# Patient Record
Sex: FEMALE | Race: WHITE | NOT HISPANIC OR LATINO | URBAN - METROPOLITAN AREA
[De-identification: names, ages, dates, MRNs, and addresses within clinical notes are randomized per-mention and may not be internally consistent; named-entity substitution may affect disease eponyms.]

---

## 2017-09-01 ENCOUNTER — EMERGENCY (EMERGENCY)
Facility: HOSPITAL | Age: 77
LOS: 1 days | Discharge: HOME | End: 2017-09-01
Admitting: EMERGENCY MEDICINE
Payer: MEDICARE

## 2017-09-01 PROCEDURE — 90700 DTAP VACCINE < 7 YRS IM: CPT

## 2017-09-01 PROCEDURE — 70450 CT HEAD/BRAIN W/O DYE: CPT | Mod: 26

## 2017-09-01 PROCEDURE — 70450 CT HEAD/BRAIN W/O DYE: CPT

## 2017-09-01 PROCEDURE — 99284 EMERGENCY DEPT VISIT MOD MDM: CPT | Mod: 25

## 2017-09-01 PROCEDURE — 90471 IMMUNIZATION ADMIN: CPT

## 2017-09-01 PROCEDURE — 99284 EMERGENCY DEPT VISIT MOD MDM: CPT

## 2019-07-03 ENCOUNTER — INPATIENT (INPATIENT)
Facility: HOSPITAL | Age: 79
LOS: 1 days | Discharge: HOME | DRG: 280 | End: 2019-07-05
Attending: INTERNAL MEDICINE | Admitting: INTERNAL MEDICINE
Payer: MEDICARE

## 2019-07-03 VITALS
OXYGEN SATURATION: 92 % | HEIGHT: 67 IN | TEMPERATURE: 98 F | RESPIRATION RATE: 18 BRPM | DIASTOLIC BLOOD PRESSURE: 105 MMHG | WEIGHT: 160.06 LBS | HEART RATE: 122 BPM | SYSTOLIC BLOOD PRESSURE: 159 MMHG

## 2019-07-03 LAB
ALBUMIN SERPL ELPH-MCNC: 3.6 G/DL — SIGNIFICANT CHANGE UP (ref 3.3–5)
ALP SERPL-CCNC: 164 U/L — HIGH (ref 40–120)
ALT FLD-CCNC: 30 U/L DA — SIGNIFICANT CHANGE UP (ref 10–45)
ANION GAP SERPL CALC-SCNC: 11 MMOL/L — SIGNIFICANT CHANGE UP (ref 5–17)
APPEARANCE UR: SIGNIFICANT CHANGE UP
APTT BLD: 28 SEC — SIGNIFICANT CHANGE UP (ref 27.5–36.3)
AST SERPL-CCNC: 23 U/L — SIGNIFICANT CHANGE UP (ref 10–40)
BACTERIA # UR AUTO: ABNORMAL /HPF
BASOPHILS # BLD AUTO: 0.06 K/UL — SIGNIFICANT CHANGE UP (ref 0–0.2)
BASOPHILS NFR BLD AUTO: 0.5 % — SIGNIFICANT CHANGE UP (ref 0–2)
BILIRUB SERPL-MCNC: 0.6 MG/DL — SIGNIFICANT CHANGE UP (ref 0.2–1.2)
BILIRUB UR-MCNC: NEGATIVE — SIGNIFICANT CHANGE UP
BUN SERPL-MCNC: 14 MG/DL — SIGNIFICANT CHANGE UP (ref 7–23)
CALCIUM SERPL-MCNC: 9.4 MG/DL — SIGNIFICANT CHANGE UP (ref 8.4–10.5)
CHLORIDE SERPL-SCNC: 96 MMOL/L — SIGNIFICANT CHANGE UP (ref 96–108)
CO2 SERPL-SCNC: 24 MMOL/L — SIGNIFICANT CHANGE UP (ref 22–31)
COLOR SPEC: YELLOW — SIGNIFICANT CHANGE UP
CREAT SERPL-MCNC: 0.86 MG/DL — SIGNIFICANT CHANGE UP (ref 0.5–1.3)
DIFF PNL FLD: ABNORMAL
EOSINOPHIL # BLD AUTO: 0.07 K/UL — SIGNIFICANT CHANGE UP (ref 0–0.5)
EOSINOPHIL NFR BLD AUTO: 0.5 % — SIGNIFICANT CHANGE UP (ref 0–6)
EPI CELLS # UR: SIGNIFICANT CHANGE UP
GLUCOSE SERPL-MCNC: 161 MG/DL — HIGH (ref 70–99)
GLUCOSE UR QL: NEGATIVE — SIGNIFICANT CHANGE UP
HCT VFR BLD CALC: 40.9 % — SIGNIFICANT CHANGE UP (ref 34.5–45)
HGB BLD-MCNC: 13.5 G/DL — SIGNIFICANT CHANGE UP (ref 11.5–15.5)
IMM GRANULOCYTES NFR BLD AUTO: 0.3 % — SIGNIFICANT CHANGE UP (ref 0–1.5)
INR BLD: 1.07 RATIO — SIGNIFICANT CHANGE UP (ref 0.88–1.16)
KETONES UR-MCNC: ABNORMAL
LACTATE SERPL-SCNC: 1.8 MMOL/L — SIGNIFICANT CHANGE UP (ref 0.7–2)
LEUKOCYTE ESTERASE UR-ACNC: ABNORMAL
LYMPHOCYTES # BLD AUTO: 1.46 K/UL — SIGNIFICANT CHANGE UP (ref 1–3.3)
LYMPHOCYTES # BLD AUTO: 11.3 % — LOW (ref 13–44)
MAGNESIUM SERPL-MCNC: 2 MG/DL — SIGNIFICANT CHANGE UP (ref 1.6–2.6)
MCHC RBC-ENTMCNC: 32.1 PG — SIGNIFICANT CHANGE UP (ref 27–34)
MCHC RBC-ENTMCNC: 33 GM/DL — SIGNIFICANT CHANGE UP (ref 32–36)
MCV RBC AUTO: 97.4 FL — SIGNIFICANT CHANGE UP (ref 80–100)
MONOCYTES # BLD AUTO: 0.78 K/UL — SIGNIFICANT CHANGE UP (ref 0–0.9)
MONOCYTES NFR BLD AUTO: 6 % — SIGNIFICANT CHANGE UP (ref 2–14)
NEUTROPHILS # BLD AUTO: 10.55 K/UL — HIGH (ref 1.8–7.4)
NEUTROPHILS NFR BLD AUTO: 81.4 % — HIGH (ref 43–77)
NITRITE UR-MCNC: NEGATIVE — SIGNIFICANT CHANGE UP
NRBC # BLD: 0 /100 WBCS — SIGNIFICANT CHANGE UP (ref 0–0)
NT-PROBNP SERPL-SCNC: 8480 PG/ML — HIGH (ref 0–300)
PH UR: 5 — SIGNIFICANT CHANGE UP (ref 5–8)
PLATELET # BLD AUTO: 348 K/UL — SIGNIFICANT CHANGE UP (ref 150–400)
POTASSIUM SERPL-MCNC: 3.8 MMOL/L — SIGNIFICANT CHANGE UP (ref 3.5–5.3)
POTASSIUM SERPL-SCNC: 3.8 MMOL/L — SIGNIFICANT CHANGE UP (ref 3.5–5.3)
PROT SERPL-MCNC: 7.5 G/DL — SIGNIFICANT CHANGE UP (ref 6–8.3)
PROT UR-MCNC: 30 MG/DL
PROTHROM AB SERPL-ACNC: 12 SEC — SIGNIFICANT CHANGE UP (ref 10–12.9)
RBC # BLD: 4.2 M/UL — SIGNIFICANT CHANGE UP (ref 3.8–5.2)
RBC # FLD: 13.1 % — SIGNIFICANT CHANGE UP (ref 10.3–14.5)
RBC CASTS # UR COMP ASSIST: SIGNIFICANT CHANGE UP /HPF (ref 0–4)
SODIUM SERPL-SCNC: 131 MMOL/L — LOW (ref 135–145)
SP GR SPEC: 1.03 — HIGH (ref 1.01–1.02)
TROPONIN I SERPL-MCNC: 0.88 NG/ML — HIGH (ref 0.02–0.06)
UROBILINOGEN FLD QL: NEGATIVE — SIGNIFICANT CHANGE UP
WBC # BLD: 12.96 K/UL — HIGH (ref 3.8–10.5)
WBC # FLD AUTO: 12.96 K/UL — HIGH (ref 3.8–10.5)
WBC UR QL: ABNORMAL /HPF (ref 0–5)

## 2019-07-03 PROCEDURE — 93010 ELECTROCARDIOGRAM REPORT: CPT | Mod: 76

## 2019-07-03 PROCEDURE — 99291 CRITICAL CARE FIRST HOUR: CPT

## 2019-07-03 PROCEDURE — 71046 X-RAY EXAM CHEST 2 VIEWS: CPT | Mod: 26

## 2019-07-03 PROCEDURE — 71275 CT ANGIOGRAPHY CHEST: CPT | Mod: 26

## 2019-07-03 RX ORDER — ACETAMINOPHEN 500 MG
650 TABLET ORAL ONCE
Refills: 0 | Status: COMPLETED | OUTPATIENT
Start: 2019-07-03 | End: 2019-07-03

## 2019-07-03 RX ORDER — AZITHROMYCIN 500 MG/1
500 TABLET, FILM COATED ORAL ONCE
Refills: 0 | Status: COMPLETED | OUTPATIENT
Start: 2019-07-03 | End: 2019-07-03

## 2019-07-03 RX ORDER — SODIUM CHLORIDE 9 MG/ML
2300 INJECTION INTRAMUSCULAR; INTRAVENOUS; SUBCUTANEOUS ONCE
Refills: 0 | Status: COMPLETED | OUTPATIENT
Start: 2019-07-03 | End: 2019-07-03

## 2019-07-03 RX ORDER — CEFTRIAXONE 500 MG/1
1000 INJECTION, POWDER, FOR SOLUTION INTRAMUSCULAR; INTRAVENOUS ONCE
Refills: 0 | Status: COMPLETED | OUTPATIENT
Start: 2019-07-03 | End: 2019-07-03

## 2019-07-03 RX ORDER — FUROSEMIDE 40 MG
20 TABLET ORAL ONCE
Refills: 0 | Status: COMPLETED | OUTPATIENT
Start: 2019-07-03 | End: 2019-07-03

## 2019-07-03 RX ORDER — ASPIRIN/CALCIUM CARB/MAGNESIUM 324 MG
325 TABLET ORAL ONCE
Refills: 0 | Status: COMPLETED | OUTPATIENT
Start: 2019-07-03 | End: 2019-07-03

## 2019-07-03 RX ADMIN — AZITHROMYCIN 500 MILLIGRAM(S): 500 TABLET, FILM COATED ORAL at 21:41

## 2019-07-03 RX ADMIN — AZITHROMYCIN 255 MILLIGRAM(S): 500 TABLET, FILM COATED ORAL at 20:41

## 2019-07-03 RX ADMIN — SODIUM CHLORIDE 2300 MILLILITER(S): 9 INJECTION INTRAMUSCULAR; INTRAVENOUS; SUBCUTANEOUS at 20:15

## 2019-07-03 RX ADMIN — Medication 325 MILLIGRAM(S): at 21:09

## 2019-07-03 RX ADMIN — CEFTRIAXONE 1000 MILLIGRAM(S): 500 INJECTION, POWDER, FOR SOLUTION INTRAMUSCULAR; INTRAVENOUS at 20:18

## 2019-07-03 RX ADMIN — Medication 650 MILLIGRAM(S): at 21:41

## 2019-07-03 RX ADMIN — Medication 20 MILLIGRAM(S): at 23:22

## 2019-07-03 RX ADMIN — SODIUM CHLORIDE 2300 MILLILITER(S): 9 INJECTION INTRAMUSCULAR; INTRAVENOUS; SUBCUTANEOUS at 19:15

## 2019-07-03 RX ADMIN — CEFTRIAXONE 100 MILLIGRAM(S): 500 INJECTION, POWDER, FOR SOLUTION INTRAMUSCULAR; INTRAVENOUS at 19:38

## 2019-07-03 RX ADMIN — Medication 650 MILLIGRAM(S): at 20:41

## 2019-07-03 NOTE — ED PROVIDER NOTE - CARE PLAN
Principal Discharge DX:	Sepsis, due to unspecified organism  Secondary Diagnosis:	NSTEMI (non-ST elevated myocardial infarction)  Secondary Diagnosis:	Congestive heart failure, unspecified HF chronicity, unspecified heart failure type  Secondary Diagnosis:	Pneumonia of right lower lobe due to infectious organism

## 2019-07-03 NOTE — ED PROVIDER NOTE - PROGRESS NOTE DETAILS
ALEX Fermin: Patient reported her SOB was worsened, I reassessed her, lung exam now with crackles more prominent on left lung, IVF were stopped. Abx continued. labs reviewed, wbc 12.9, trop elevated and PBNP 8480, CXR with right lung infiltrate and b/l pleural effusion. CTA chest pending to r/o PE, ASA given

## 2019-07-03 NOTE — ED PROVIDER NOTE - OBJECTIVE STATEMENT
79 y/o F with no reported PMH presents to the ED with c/o intermittent episodes of nonexertional, non radiating chest tightness x 2 days and SOB, worsened with supination, also reported rhinorrhea. Also reported she has to sleep propped up on pillows x 2 days. Denies palpitations, n/v/d, calf pain, ankle swelling, prolonged immobilization or all other complaints

## 2019-07-03 NOTE — ED PROVIDER NOTE - PHYSICAL EXAMINATION
patient desats to 92% on RA- she denies using home oxygen or h/o of COPD/asthma, she was placed on [place] oxygen

## 2019-07-03 NOTE — ED ADULT NURSE NOTE - NSIMPLEMENTINTERV_GEN_ALL_ED
Implemented All Universal Safety Interventions:  [place] to call system. Call bell, personal items and telephone within reach. Instruct patient to call for assistance. Room bathroom lighting operational. Non-slip footwear when patient is off stretcher. Physically safe environment: no spills, clutter or unnecessary equipment. Stretcher in lowest position, wheels locked, appropriate side rails in place.

## 2019-07-03 NOTE — ED ADULT NURSE NOTE - CHPI ED NUR SYMPTOMS NEG
no fever/no back pain/no diaphoresis/no nausea/no dizziness/no congestion/no vomiting/no chills/no syncope

## 2019-07-03 NOTE — ED PROVIDER NOTE - ATTENDING CONTRIBUTION TO CARE
Presents to emergency room with fever cough congestion chest pain.Patient noted to havePleural effusionReceived sepsis protocolFluids and antibioticsProgressive increasing shortness of breath noted fluids were stoppedPatient received a CAT scan offChest. Which diagnosed to havePulmonary embolism. Patient received Lasix forCongestion. Results were discussed with patient plan admission andHistory and physical discussed with the hospitalist. Patient's condition improved. I personally saw the patient with the PA, and completed the key components of the history and physical exam. I then discussed the management plan with the PA.

## 2019-07-03 NOTE — ED ADULT NURSE NOTE - OBJECTIVE STATEMENT
Pt arrived to the ER c/o chest discomfort. Pt c/o SOB and chest tightness that began Monday and has been getting increasingly worse. Pt states pain radiates to her lower abdomen. Pt denies any nausea, vomiting, dizziness, or fevers.

## 2019-07-03 NOTE — ED PROVIDER NOTE - SECONDARY DIAGNOSIS.
NSTEMI (non-ST elevated myocardial infarction) Congestive heart failure, unspecified HF chronicity, unspecified heart failure type Pneumonia of right lower lobe due to infectious organism

## 2019-07-04 DIAGNOSIS — A41.9 SEPSIS, UNSPECIFIED ORGANISM: ICD-10-CM

## 2019-07-04 DIAGNOSIS — I26.99 OTHER PULMONARY EMBOLISM WITHOUT ACUTE COR PULMONALE: ICD-10-CM

## 2019-07-04 LAB
ANION GAP SERPL CALC-SCNC: 12 MMOL/L — SIGNIFICANT CHANGE UP (ref 5–17)
BASOPHILS # BLD AUTO: 0.07 K/UL — SIGNIFICANT CHANGE UP (ref 0–0.2)
BASOPHILS NFR BLD AUTO: 0.7 % — SIGNIFICANT CHANGE UP (ref 0–2)
BUN SERPL-MCNC: 12 MG/DL — SIGNIFICANT CHANGE UP (ref 7–23)
CALCIUM SERPL-MCNC: 8.8 MG/DL — SIGNIFICANT CHANGE UP (ref 8.4–10.5)
CHLORIDE SERPL-SCNC: 98 MMOL/L — SIGNIFICANT CHANGE UP (ref 96–108)
CHOLEST SERPL-MCNC: 204 MG/DL — HIGH (ref 10–199)
CO2 SERPL-SCNC: 25 MMOL/L — SIGNIFICANT CHANGE UP (ref 22–31)
CREAT SERPL-MCNC: 0.79 MG/DL — SIGNIFICANT CHANGE UP (ref 0.5–1.3)
EOSINOPHIL # BLD AUTO: 0.07 K/UL — SIGNIFICANT CHANGE UP (ref 0–0.5)
EOSINOPHIL NFR BLD AUTO: 0.7 % — SIGNIFICANT CHANGE UP (ref 0–6)
GLUCOSE SERPL-MCNC: 134 MG/DL — HIGH (ref 70–99)
HCT VFR BLD CALC: 36.8 % — SIGNIFICANT CHANGE UP (ref 34.5–45)
HDLC SERPL-MCNC: 50 MG/DL — SIGNIFICANT CHANGE UP
HGB BLD-MCNC: 12.3 G/DL — SIGNIFICANT CHANGE UP (ref 11.5–15.5)
IMM GRANULOCYTES NFR BLD AUTO: 0.5 % — SIGNIFICANT CHANGE UP (ref 0–1.5)
LIPID PNL WITH DIRECT LDL SERPL: 134 MG/DL — HIGH
LYMPHOCYTES # BLD AUTO: 2.15 K/UL — SIGNIFICANT CHANGE UP (ref 1–3.3)
LYMPHOCYTES # BLD AUTO: 22.7 % — SIGNIFICANT CHANGE UP (ref 13–44)
MAGNESIUM SERPL-MCNC: 1.9 MG/DL — SIGNIFICANT CHANGE UP (ref 1.6–2.6)
MCHC RBC-ENTMCNC: 31.6 PG — SIGNIFICANT CHANGE UP (ref 27–34)
MCHC RBC-ENTMCNC: 33.4 GM/DL — SIGNIFICANT CHANGE UP (ref 32–36)
MCV RBC AUTO: 94.6 FL — SIGNIFICANT CHANGE UP (ref 80–100)
MONOCYTES # BLD AUTO: 0.81 K/UL — SIGNIFICANT CHANGE UP (ref 0–0.9)
MONOCYTES NFR BLD AUTO: 8.5 % — SIGNIFICANT CHANGE UP (ref 2–14)
NEUTROPHILS # BLD AUTO: 6.33 K/UL — SIGNIFICANT CHANGE UP (ref 1.8–7.4)
NEUTROPHILS NFR BLD AUTO: 66.9 % — SIGNIFICANT CHANGE UP (ref 43–77)
NRBC # BLD: 0 /100 WBCS — SIGNIFICANT CHANGE UP (ref 0–0)
PLATELET # BLD AUTO: 307 K/UL — SIGNIFICANT CHANGE UP (ref 150–400)
POTASSIUM SERPL-MCNC: 3.6 MMOL/L — SIGNIFICANT CHANGE UP (ref 3.5–5.3)
POTASSIUM SERPL-SCNC: 3.6 MMOL/L — SIGNIFICANT CHANGE UP (ref 3.5–5.3)
RBC # BLD: 3.89 M/UL — SIGNIFICANT CHANGE UP (ref 3.8–5.2)
RBC # FLD: 13.1 % — SIGNIFICANT CHANGE UP (ref 10.3–14.5)
SODIUM SERPL-SCNC: 135 MMOL/L — SIGNIFICANT CHANGE UP (ref 135–145)
TOTAL CHOLESTEROL/HDL RATIO MEASUREMENT: 4.1 RATIO — SIGNIFICANT CHANGE UP (ref 3.3–7.1)
TRIGL SERPL-MCNC: 101 MG/DL — SIGNIFICANT CHANGE UP (ref 10–149)
TROPONIN I SERPL-MCNC: 1.2 NG/ML — HIGH (ref 0.02–0.06)
TROPONIN I SERPL-MCNC: 1.54 NG/ML — HIGH (ref 0.02–0.06)
TROPONIN I SERPL-MCNC: 1.56 NG/ML — HIGH (ref 0.02–0.06)
TSH SERPL-MCNC: 2.63 UIU/ML — SIGNIFICANT CHANGE UP (ref 0.27–4.2)
WBC # BLD: 9.48 K/UL — SIGNIFICANT CHANGE UP (ref 3.8–10.5)
WBC # FLD AUTO: 9.48 K/UL — SIGNIFICANT CHANGE UP (ref 3.8–10.5)

## 2019-07-04 PROCEDURE — 99223 1ST HOSP IP/OBS HIGH 75: CPT

## 2019-07-04 PROCEDURE — 93970 EXTREMITY STUDY: CPT | Mod: 26

## 2019-07-04 PROCEDURE — 93010 ELECTROCARDIOGRAM REPORT: CPT

## 2019-07-04 PROCEDURE — 93306 TTE W/DOPPLER COMPLETE: CPT | Mod: 26

## 2019-07-04 RX ORDER — ENOXAPARIN SODIUM 100 MG/ML
75 INJECTION SUBCUTANEOUS EVERY 12 HOURS
Refills: 0 | Status: DISCONTINUED | OUTPATIENT
Start: 2019-07-04 | End: 2019-07-05

## 2019-07-04 RX ORDER — POTASSIUM CHLORIDE 20 MEQ
10 PACKET (EA) ORAL DAILY
Refills: 0 | Status: DISCONTINUED | OUTPATIENT
Start: 2019-07-04 | End: 2019-07-05

## 2019-07-04 RX ORDER — ASPIRIN/CALCIUM CARB/MAGNESIUM 324 MG
81 TABLET ORAL DAILY
Refills: 0 | Status: DISCONTINUED | OUTPATIENT
Start: 2019-07-04 | End: 2019-07-05

## 2019-07-04 RX ORDER — ACETAMINOPHEN 500 MG
650 TABLET ORAL EVERY 6 HOURS
Refills: 0 | Status: DISCONTINUED | OUTPATIENT
Start: 2019-07-04 | End: 2019-07-05

## 2019-07-04 RX ORDER — FUROSEMIDE 40 MG
40 TABLET ORAL DAILY
Refills: 0 | Status: DISCONTINUED | OUTPATIENT
Start: 2019-07-04 | End: 2019-07-05

## 2019-07-04 RX ORDER — METOPROLOL TARTRATE 50 MG
12.5 TABLET ORAL
Refills: 0 | Status: DISCONTINUED | OUTPATIENT
Start: 2019-07-04 | End: 2019-07-05

## 2019-07-04 RX ORDER — ENOXAPARIN SODIUM 100 MG/ML
120 INJECTION SUBCUTANEOUS ONCE
Refills: 0 | Status: DISCONTINUED | OUTPATIENT
Start: 2019-07-04 | End: 2019-07-04

## 2019-07-04 RX ORDER — ENOXAPARIN SODIUM 100 MG/ML
110 INJECTION SUBCUTANEOUS ONCE
Refills: 0 | Status: COMPLETED | OUTPATIENT
Start: 2019-07-04 | End: 2019-07-04

## 2019-07-04 RX ADMIN — ENOXAPARIN SODIUM 75 MILLIGRAM(S): 100 INJECTION SUBCUTANEOUS at 23:06

## 2019-07-04 RX ADMIN — Medication 12.5 MILLIGRAM(S): at 05:06

## 2019-07-04 RX ADMIN — Medication 10 MILLIEQUIVALENT(S): at 11:09

## 2019-07-04 RX ADMIN — Medication 12.5 MILLIGRAM(S): at 01:47

## 2019-07-04 RX ADMIN — ENOXAPARIN SODIUM 110 MILLIGRAM(S): 100 INJECTION SUBCUTANEOUS at 00:27

## 2019-07-04 RX ADMIN — Medication 40 MILLIGRAM(S): at 05:06

## 2019-07-04 RX ADMIN — Medication 81 MILLIGRAM(S): at 11:09

## 2019-07-04 RX ADMIN — Medication 12.5 MILLIGRAM(S): at 17:06

## 2019-07-04 NOTE — PHARMACOTHERAPY INTERVENTION NOTE - COMMENTS
Patient weighs 72.6 kg so I questioned why enoxaparin dose was 120 mg.  Dr. Christianson said the dose is based on 1.5 x patient's weight.  When calculated, this dose is closer to 110 mg than 120 mg so the dose was adjusted accordingly.

## 2019-07-04 NOTE — CONSULT NOTE ADULT - SUBJECTIVE AND OBJECTIVE BOX
CHIEF COMPLAINT:  Patient is a 78y old  Female who presents with a chief complaint of worsened chest pressure/tightness for 2-3 days (04 Jul 2019 00:53)    HPI:  79 y/o F with no reported PMH presents to the ED with c/o intermittent episodes of nonexertional, non radiating chest pressure/tightness x 2 days, dyspnea with exertion, orthopnea.  Chest pressure got worse today so she took 4 baby ASA.  Denies cough, fever, chills, palpitations, n/v/d, calf pain, ankle swelling, prolonged immobilization or all other complaints  CT Angio chest revealed segmental PE, Bilat pleural effusions, CHF.  Pt also has +ve #1 trop to 0.8. (04 Jul 2019 00:53)    Had bus trip to [place] 1 month ago.  No history of heart disease, ulcers, hbp dm dyslipidemia.      PMH:   No pertinent past medical history      PSH:   No significant past surgical history      FAMILY HISTORY:  Non contributory      SOCIAL HISTORY:  Smoking:  No  Alcohol:  social  [place]    ALLERGIES:  No Known Allergies      Home Medications:  Multiple Vitamins oral tablet: 1 tab(s) orally once a day (04 Jul 2019 01:03)      MEDICATIONS:  acetaminophen   Tablet .. 650 milliGRAM(s) Oral every 6 hours PRN  aspirin enteric coated 81 milliGRAM(s) Oral daily  enoxaparin Injectable 75 milliGRAM(s) SubCutaneous every 12 hours  furosemide   Injectable 40 milliGRAM(s) IV Push daily  metoprolol tartrate 12.5 milliGRAM(s) Oral two times a day  potassium chloride    Tablet ER 10 milliEquivalent(s) Oral daily      REVIEW OF SYSTEMS:  CONSTITUTIONAL: No fever, weight loss, or fatigue  EYES: No eye pain, visual disturbances, or discharge  ENMT:  No difficulty hearing, tinnitus, vertigo; No sinus or throat pain  NECK: No pain or stiffness  BREASTS: No pain, masses, or nipple discharge  RESPIRATORY: No cough, wheezing, chills or hemoptysis; No shortness of breath  CARDIOVASCULAR: See HPI  GASTROINTESTINAL: No abdominal or epigastric pain. No nausea, vomiting, or hematemesis; No diarrhea or constipation. No melena or hematochezia.  GENITOURINARY: No dysuria, frequency, hematuria, or incontinence  NEUROLOGICAL: No headaches, memory loss, loss of strength, numbness, or tremors  SKIN: No itching, burning, rashes, or lesions   LYMPH NODES: No enlarged glands  ENDOCRINE: No heat or cold intolerance; No hair loss  MUSCULOSKELETAL: No joint pain or swelling; No muscle, back, or extremity pain  PSYCHIATRIC: No depression, anxiety, mood swings, or difficulty sleeping  HEME/LYMPH: No easy bruising, or bleeding gums  ALLERGY AND IMMUNOLOGIC: No hives or eczema    PHYSICAL EXAM:  T(C): 36.5 (07-04-19 @ 08:49), Max: 37.8 (07-03-19 @ 19:00)  HR: 99 (07-04-19 @ 08:49) (96 - 122)  BP: 137/74 (07-04-19 @ 08:49) (124/81 - 166/96)  RR: 16 (07-04-19 @ 08:49) (16 - 27)  SpO2: 96% (07-04-19 @ 08:49) (92% - 100%)  Wt(kg): --    GENERAL: NAD, well-groomed, well-developed  HEAD:  Atraumatic, Normocephalic  EYES: EOMI, conjunctiva and sclera clear  ENT: Moist mucous membranes,  NECK: Supple, No JVD, no bruits  CHEST/LUNG: Clear to ausculation and percussion bilaterally; No rales, rhonchi, wheezing, or rubs  HEART: Regular rate and rhythm; No murmurs, rubs, or gallops PMI non displaced.  ABDOMEN: Soft, Nontender, Nondistended; Bowel sounds present  EXTREMITIES:  No clubbing, cyanosis, or edema _ Mikael's  SKIN: No rashes or lesions  NERVOUS SYSTEM:  Alert & Oriented X3, No focal deficits    Cardiovascular Diagnostic Testing:  ECG:  < from: 12 Lead ECG (07.03.19 @ 21:20) >    Ventricular Rate 113 BPM    Atrial Rate 113 BPM    P-R Interval 192 ms    QRS Duration 100 ms    Q-T Interval 398 ms    QTC Calculation(Bezet) 545 ms    P Axis 56 degrees    R Axis 74 degrees    T Axis 149 degrees    Diagnosis Line Sinus tachycardia with fusion complexes  Possible Left atrial enlargement  consider  Septal infarct (cited on or before 03-JUL-2019)  ST and T wave abnormality, consider lateral ischemia  Prolonged QT  Abnormal ECG  When compared with ECG of 03-JUL-2019 18:48,  fusion complexes are now present    Confirmed by TAMMY NICOLE, TIFFANY CREWS ([id number]) on 7/4/2019 8:54:43 AM    < end of copied text >      LABS:                        12.3   9.48  )-----------( 307      ( 04 Jul 2019 07:00 )             36.8     07-04    135  |  98  |  12  ----------------------------<  134<H>  3.6   |  25  |  0.79    Ca    8.8      04 Jul 2019 07:00  Mg     1.9     07-04    TPro  7.5  /  Alb  3.6  /  TBili  0.6  /  DBili  x   /  AST  23  /  ALT  30  /  AlkPhos  164<H>  07-03    PT/INR - ( 03 Jul 2019 19:35 )   PT: 12.0 sec;   INR: 1.07 ratio         PTT - ( 03 Jul 2019 19:35 )  PTT:28.0 sec  CARDIAC MARKERS ( 04 Jul 2019 07:00 )  1.536 ng/mL / x     / x     / x     / x      CARDIAC MARKERS ( 04 Jul 2019 02:00 )  1.204 ng/mL / x     / x     / x     / x      CARDIAC MARKERS ( 03 Jul 2019 19:35 )  .878 ng/mL / x     / x     / x     / x          Serum Pro-Brain Natriuretic Peptide: 8480 pg/mL (07-03 @ 19:35)      Telemetry:  sinus  burst of SVT    IMAGING:  < from: CT Angio Chest w/ IV Cont (07.03.19 @ 22:20) >    EXAM:  CT ANGIO CHEST (W)AW IC      PROCEDURE DATE:  07/03/2019        INTERPRETATION:  CLINICAL INFORMATION: Shortness of breath, assess PE.     PROCEDURE: CT angiography of the chest was performed with intravenous   contrast utilizing dedicated PE protocol. 75 mls of Omnipaque-350   administered without complication. 25 ml discarded. Coronal and sagittal   reconstruction images were obtained. Axial MIP images were obtained from   a separate workstation.      COMPARISON: None.    FINDINGS: Thepatient's respiratory motion degrades images.    LUNGS AND AIRWAYS: Patent central airways. Nonspecific interlobular   septal thickening. Groundglass/patchy opacities in bilateral upper and   lower lobes and lingula.  PLEURA: No pneumothorax. Small to moderate right and small left pleural   effusion.  HEART: Mild cardiomegaly. No pericardial effusion.  VESSELS: Adequate contrast opacification to the level of the segmental   pulmonary arteries. Emboli in bilateral upper lobe, right middle lobe and  right lower lobe segmental pulmonary arteries. Main pulmonary artery   enlargement (3.2 cm), suggestive of pulmonary arterial hypertension. Mild   contrast reflux into the azygos vein and IVC without significant   interventricular septal straightening/bowing. Atherosclerotic change of   the thoracic aorta, great vessel origin and coronary arteries.  CHEST WALL AND LOWER NECK: Within normal limits.   MEDIASTINUM AND LUIS: A 2.0 x 1.6 cm right paratracheal lymph node. A 1.2   x 2.0 cm right hilarlymph node.  UPPER ABDOMEN: Grossly unremarkable.   BONES: S-shaped curvature and degenerative changes of the spine.     IMPRESSION:    Emboli in bilateral upper lobe, right middle lobe and right lower lobe   segmental pulmonary arteries. Nonspecificinterlobular septal thickening   and groundglass/patchy opacities in bilateral upper and lower lobes,   which can be seen in setting of pulmonary edema given cardiomegaly and   pleural effusion although pneumonia cannot be excluded. Please note that   pulmonary infarct cannot be excluded in this setting.      Nonspecific mediastinal and right hilar lymphadenopathy.    Additional findings as described.    Discussed with Dr. Christianson.        MATT THIBODEAUX     < end of copied text >    < from: Xray Chest 2 Views PA/Lat (07.03.19 @ 20:04) >    EXAM:  XR CHEST PA LAT 2V      PROCEDURE DATE:  07/03/2019        INTERPRETATION:  cough    PA and lateral radiographs of the chest demonstrate fibrotic changes in   both lungs. There is patchy airspace disease in the right lung suggesting   superimposed pneumonia..      The costophrenic angles are mildly blunted with small bilateral pleural   effusions.      Heart is borderline prominent     No pneumothorax noted    Mild scoliosis with degenerative change    IMPRESSION:  Right lung infiltrate with underlying chronic lung disease and small   bilateral pleural effusions.      < end of copied text > CHIEF COMPLAINT:  Patient is a 78y old  Female who presents with a chief complaint of worsened chest pressure/tightness for 2-3 days (04 Jul 2019 00:53)    HPI:  79 y/o F with no reported PMH presents to the ED with c/o intermittent episodes of nonexertional, non radiating chest pressure/tightness x 2 days, dyspnea with exertion, orthopnea.  Chest pressure got worse today so she took 4 baby ASA.  Denies cough, fever, chills, palpitations, n/v/d, calf pain, ankle swelling, prolonged immobilization or all other complaints  CT Angio chest revealed segmental PE, Bilat pleural effusions, CHF.  Pt also has +ve #1 trop to 0.8. (04 Jul 2019 00:53)    Had bus trip to [place] 1 month ago.  No history of heart disease, ulcers, hbp dm dyslipidemia.  Has been under significant stress , increased recently      PMH:   No pertinent past medical history      PSH:   No significant past surgical history      FAMILY HISTORY:  Non contributory      SOCIAL HISTORY:  Smoking:  No  Alcohol:  social  [place]    ALLERGIES:  No Known Allergies      Home Medications:  Multiple Vitamins oral tablet: 1 tab(s) orally once a day (04 Jul 2019 01:03)      MEDICATIONS:  acetaminophen   Tablet .. 650 milliGRAM(s) Oral every 6 hours PRN  aspirin enteric coated 81 milliGRAM(s) Oral daily  enoxaparin Injectable 75 milliGRAM(s) SubCutaneous every 12 hours  furosemide   Injectable 40 milliGRAM(s) IV Push daily  metoprolol tartrate 12.5 milliGRAM(s) Oral two times a day  potassium chloride    Tablet ER 10 milliEquivalent(s) Oral daily      REVIEW OF SYSTEMS:  CONSTITUTIONAL: No fever, weight loss, or fatigue  EYES: No eye pain, visual disturbances, or discharge  ENMT:  No difficulty hearing, tinnitus, vertigo; No sinus or throat pain  NECK: No pain or stiffness  BREASTS: No pain, masses, or nipple discharge  RESPIRATORY: No cough, wheezing, chills or hemoptysis; No shortness of breath  CARDIOVASCULAR: See HPI  GASTROINTESTINAL: No abdominal or epigastric pain. No nausea, vomiting, or hematemesis; No diarrhea or constipation. No melena or hematochezia.  GENITOURINARY: No dysuria, frequency, hematuria, or incontinence  NEUROLOGICAL: No headaches, memory loss, loss of strength, numbness, or tremors  SKIN: No itching, burning, rashes, or lesions   LYMPH NODES: No enlarged glands  ENDOCRINE: No heat or cold intolerance; No hair loss  MUSCULOSKELETAL: No joint pain or swelling; No muscle, back, or extremity pain  PSYCHIATRIC: No depression, anxiety, mood swings, or difficulty sleeping  HEME/LYMPH: No easy bruising, or bleeding gums  ALLERGY AND IMMUNOLOGIC: No hives or eczema    PHYSICAL EXAM:  T(C): 36.5 (07-04-19 @ 08:49), Max: 37.8 (07-03-19 @ 19:00)  HR: 99 (07-04-19 @ 08:49) (96 - 122)  BP: 137/74 (07-04-19 @ 08:49) (124/81 - 166/96)  RR: 16 (07-04-19 @ 08:49) (16 - 27)  SpO2: 96% (07-04-19 @ 08:49) (92% - 100%)  Wt(kg): --    GENERAL: NAD, well-groomed, well-developed  HEAD:  Atraumatic, Normocephalic  EYES: EOMI, conjunctiva and sclera clear  ENT: Moist mucous membranes,  NECK: Supple, No JVD, no bruits  CHEST/LUNG: Clear to ausculation and percussion bilaterally; No rales, rhonchi, wheezing, or rubs  HEART: Regular rate and rhythm; No murmurs, rubs, or gallops PMI non displaced.  ABDOMEN: Soft, Nontender, Nondistended; Bowel sounds present  EXTREMITIES:  No clubbing, cyanosis, or edema _ Mikael's  SKIN: No rashes or lesions  NERVOUS SYSTEM:  Alert & Oriented X3, No focal deficits    Cardiovascular Diagnostic Testing:  ECG:  < from: 12 Lead ECG (07.03.19 @ 21:20) >    Ventricular Rate 113 BPM    Atrial Rate 113 BPM    P-R Interval 192 ms    QRS Duration 100 ms    Q-T Interval 398 ms    QTC Calculation(Bezet) 545 ms    P Axis 56 degrees    R Axis 74 degrees    T Axis 149 degrees    Diagnosis Line Sinus tachycardia with fusion complexes  Possible Left atrial enlargement  consider  Septal infarct (cited on or before 03-JUL-2019)  ST and T wave abnormality, consider lateral ischemia  Prolonged QT  Abnormal ECG  When compared with ECG of 03-JUL-2019 18:48,  fusion complexes are now present    Confirmed by TMAMY NICOLE, TIFFANY CREWS ([id number]) on 7/4/2019 8:54:43 AM    < end of copied text >      LABS:                        12.3   9.48  )-----------( 307      ( 04 Jul 2019 07:00 )             36.8     07-04    135  |  98  |  12  ----------------------------<  134<H>  3.6   |  25  |  0.79    Ca    8.8      04 Jul 2019 07:00  Mg     1.9     07-04    TPro  7.5  /  Alb  3.6  /  TBili  0.6  /  DBili  x   /  AST  23  /  ALT  30  /  AlkPhos  164<H>  07-03    PT/INR - ( 03 Jul 2019 19:35 )   PT: 12.0 sec;   INR: 1.07 ratio         PTT - ( 03 Jul 2019 19:35 )  PTT:28.0 sec  CARDIAC MARKERS ( 04 Jul 2019 07:00 )  1.536 ng/mL / x     / x     / x     / x      CARDIAC MARKERS ( 04 Jul 2019 02:00 )  1.204 ng/mL / x     / x     / x     / x      CARDIAC MARKERS ( 03 Jul 2019 19:35 )  .878 ng/mL / x     / x     / x     / x          Serum Pro-Brain Natriuretic Peptide: 8480 pg/mL (07-03 @ 19:35)      Telemetry:  sinus  burst of SVT    IMAGING:  < from: CT Angio Chest w/ IV Cont (07.03.19 @ 22:20) >    EXAM:  CT ANGIO CHEST (W)AW IC      PROCEDURE DATE:  07/03/2019        INTERPRETATION:  CLINICAL INFORMATION: Shortness of breath, assess PE.     PROCEDURE: CT angiography of the chest was performed with intravenous   contrast utilizing dedicated PE protocol. 75 mls of Omnipaque-350   administered without complication. 25 ml discarded. Coronal and sagittal   reconstruction images were obtained. Axial MIP images were obtained from   a separate workstation.      COMPARISON: None.    FINDINGS: Thepatient's respiratory motion degrades images.    LUNGS AND AIRWAYS: Patent central airways. Nonspecific interlobular   septal thickening. Groundglass/patchy opacities in bilateral upper and   lower lobes and lingula.  PLEURA: No pneumothorax. Small to moderate right and small left pleural   effusion.  HEART: Mild cardiomegaly. No pericardial effusion.  VESSELS: Adequate contrast opacification to the level of the segmental   pulmonary arteries. Emboli in bilateral upper lobe, right middle lobe and  right lower lobe segmental pulmonary arteries. Main pulmonary artery   enlargement (3.2 cm), suggestive of pulmonary arterial hypertension. Mild   contrast reflux into the azygos vein and IVC without significant   interventricular septal straightening/bowing. Atherosclerotic change of   the thoracic aorta, great vessel origin and coronary arteries.  CHEST WALL AND LOWER NECK: Within normal limits.   MEDIASTINUM AND LUIS: A 2.0 x 1.6 cm right paratracheal lymph node. A 1.2   x 2.0 cm right hilarlymph node.  UPPER ABDOMEN: Grossly unremarkable.   BONES: S-shaped curvature and degenerative changes of the spine.     IMPRESSION:    Emboli in bilateral upper lobe, right middle lobe and right lower lobe   segmental pulmonary arteries. Nonspecificinterlobular septal thickening   and groundglass/patchy opacities in bilateral upper and lower lobes,   which can be seen in setting of pulmonary edema given cardiomegaly and   pleural effusion although pneumonia cannot be excluded. Please note that   pulmonary infarct cannot be excluded in this setting.      Nonspecific mediastinal and right hilar lymphadenopathy.    Additional findings as described.    Discussed with Dr. Christianson.        MATT THIBODEAUX     < end of copied text >    < from: Xray Chest 2 Views PA/Lat (07.03.19 @ 20:04) >    EXAM:  XR CHEST PA LAT 2V      PROCEDURE DATE:  07/03/2019        INTERPRETATION:  cough    PA and lateral radiographs of the chest demonstrate fibrotic changes in   both lungs. There is patchy airspace disease in the right lung suggesting   superimposed pneumonia..      The costophrenic angles are mildly blunted with small bilateral pleural   effusions.      Heart is borderline prominent     No pneumothorax noted    Mild scoliosis with degenerative change    IMPRESSION:  Right lung infiltrate with underlying chronic lung disease and small   bilateral pleural effusions.      < end of copied text >

## 2019-07-04 NOTE — H&P ADULT - HISTORY OF PRESENT ILLNESS
79 y/o F with no reported PMH presents to the ED with c/o intermittent episodes of nonexertional, non radiating chest tightness x 2 days and SOB, worsened with supination, also reported rhinorrhea. Also reported she has to sleep propped up on pillows x 2 days. Denies palpitations, n/v/d, calf pain, ankle swelling, prolonged immobilization or all other complaints 77 y/o F with no reported PMH presents to the ED with c/o intermittent episodes of nonexertional, non radiating chest pressure/tightness x 2 days, dyspnea with exertion, orthopnea.  Chest pressure got worse today so she took 4 baby ASA.  Denies cough, fever, chills, palpitations, n/v/d, calf pain, ankle swelling, prolonged immobilization or all other complaints  CT Angio chest revealed segmental PE, Bilat pleural effusions, CHF.  Pt also has +ve #1 trop to 0.8.

## 2019-07-04 NOTE — H&P ADULT - ASSESSMENT
79 y/o female with no PMHx, presents with 2 days of intermittent chest pressure/tightness, dyspnea on exertion, worsened today.  +Acute CHF, +Pulmonary embolism, NSTEMI.    Admit  Telemetry monitor  Start Lovenox, cont ASA  Lasix IV 40 mg daily  Start Lopressor  Trend Trops  Echo, Leg dopplers ordered  Cardio Eval  EKG in AM    IMPROVE VTE Individual Risk Assessment  RISK                                                                Points  [  ] Previous VTE                                                  3  [  ] Thrombophilia                                               2  [  ] Lower limb paralysis                                      2        (unable to hold up >15 seconds)    [  ] Current Cancer                                              2         (within 6 months)  [  ] Immobilization > 24 hrs                                1  [  ] ICU/CCU stay > 24 hours                              1  [x  ] Age > 60                                                      1  IMPROVE VTE Score ___1___  IMPROVE Score 0-1: Low Risk, No VTE prophylaxis required for most patients, encourage ambulation.   IMPROVE Score 2-3: At risk, pharmacologic VTE prophylaxis is indicated for most patients (in the absence of a contraindication)  IMPROVE Score > or = 4: High Risk, pharmacologic VTE prophylaxis is indicated for most patients (in the absence of a contraindication)  **Pt is on Lovenox, tx dose

## 2019-07-04 NOTE — PROGRESS NOTE ADULT - ATTENDING COMMENTS
I have personally seen and examined patient on the above date.  I discussed the case with Asya LYNN and I agree with findings and plan as detailed per note above, which I have amended where appropriate.      pt seen and examined  cardio and pulm to see  c/w lovenox tx dose  TTE reviewed

## 2019-07-04 NOTE — H&P ADULT - NSHPPHYSICALEXAM_GEN_ALL_CORE
Vital Signs (24 Hrs):  T(C): 37.4 (07-03-19 @ 21:45), Max: 37.8 (07-03-19 @ 19:00)  HR: 111 (07-03-19 @ 22:45) (96 - 122)  BP: 147/94 (07-03-19 @ 22:45) (124/81 - 166/96)  RR: 24 (07-03-19 @ 22:45) (18 - 27)  SpO2: 98% (07-03-19 @ 22:45) (92% - 99%)  Daily Height in cm: 170.18 (03 Jul 2019 18:23)

## 2019-07-04 NOTE — PROGRESS NOTE ADULT - SUBJECTIVE AND OBJECTIVE BOX
Patient is a 78y old  Female who presents with a chief complaint of worsened chest pressure/tightness for 2-3 days (2019 09:25)      Patient seen and examined at bedside.    ALLERGIES:  No Known Allergies    MEDICATIONS:  acetaminophen   Tablet .. 650 milliGRAM(s) Oral every 6 hours PRN  aspirin enteric coated 81 milliGRAM(s) Oral daily  enoxaparin Injectable 75 milliGRAM(s) SubCutaneous every 12 hours  furosemide   Injectable 40 milliGRAM(s) IV Push daily  metoprolol tartrate 12.5 milliGRAM(s) Oral two times a day  potassium chloride    Tablet ER 10 milliEquivalent(s) Oral daily    Vital Signs Last 24 Hrs  T(F): 97.7 (2019 08:49), Max: 100.1 (2019 19:00)  HR: 99 (2019 08:49) (96 - 122)  BP: 137/74 (2019 08:49) (124/81 - 166/96)  RR: 16 (2019 08:49) (16 - 27)  SpO2: 96% (2019 08:49) (92% - 100%)  I&O's Summary      PHYSICAL EXAM:  General: NAD, A/O x 3  ENT: MMM  Neck:   Lungs: Clear to auscultation bilaterally (Anteriorly)   Cardio: RR, S1/S2, No murmurs  Abdomen: Soft, NT/ND, Normal active Bowel Sounds   Extremities: No cyanosis, No edema    LABS:                        12.3   9.48  )-----------( 307      ( 2019 07:00 )             36.8     07-    135  |  98  |  12  ----------------------------<  134  3.6   |  25  |  0.79    Ca    8.8      2019 07:00  Mg     1.9     -    TPro  7.5  /  Alb  3.6  /  TBili  0.6  /  DBili  x   /  AST  23  /  ALT  30  /  AlkPhos  164  07-03    eGFR if Non African American: 71 mL/min/1.73M2 (19 @ 07:00)  eGFR if : 83 mL/min/1.73M2 (19 @ 07:00)    PT/INR - ( 2019 19:35 )   PT: 12.0 sec;   INR: 1.07 ratio         PTT - ( 2019 19:35 )  PTT:28.0 sec  Lactate, Blood: 1.8 mmol/L ( @ 19:35)    CARDIAC MARKERS ( 2019 07:00 )  1.536 ng/mL / x     / x     / x     / x      CARDIAC MARKERS ( 2019 02:00 )  1.204 ng/mL / x     / x     / x     / x      CARDIAC MARKERS ( 2019 19:35 )  .878 ng/mL / x     / x     / x     / x            Urinalysis Basic - ( 2019 21:15 )  Color: Yellow / Appearance: slightly cloudy / S.030 / pH: x  Gluc: x / Ketone: Moderate  / Bili: Negative / Urobili: Negative   Blood: x / Protein: 30 mg/dL / Nitrite: Negative   Leuk Esterase: Small / RBC: 0-4 /HPF / WBC 6-10 /HPF   Sq Epi: x / Non Sq Epi: Neg.-Few / Bacteria: Few /HPF          RADIOLOGY & ADDITIONAL TESTS:  < from: CT Angio Chest w/ IV Cont (19 @ 22:20) >  Emboli in bilateral upper lobe, right middle lobe and right lower lobe   segmental pulmonary arteries. Nonspecificinterlobular septal thickening   and groundglass/patchy opacities in bilateral upper and lower lobes,   which can be seen in setting of pulmonary edema given cardiomegaly and   pleural effusion although pneumonia cannot be excluded. Please note that   pulmonary infarct cannot be excluded in this setting.      Nonspecific mediastinal and right hilar lymphadenopathy.    Additional findings as described.    < from: Xray Chest 2 Views PA/Lat (19 @ 20:04) >  Right lung infiltrate with underlying chronic lung disease and small   bilateral pleural effusions.      Care Discussed with Consultants/Other Providers: Dr. Costa. Patient is a 78y old  Female who presents with a chief complaint of worsened chest pressure/tightness for 2-3 days (2019 09:25)      Patient seen and examined at bedside.    ALLERGIES:  No Known Allergies    MEDICATIONS:  acetaminophen   Tablet .. 650 milliGRAM(s) Oral every 6 hours PRN  aspirin enteric coated 81 milliGRAM(s) Oral daily  enoxaparin Injectable 75 milliGRAM(s) SubCutaneous every 12 hours  furosemide   Injectable 40 milliGRAM(s) IV Push daily  metoprolol tartrate 12.5 milliGRAM(s) Oral two times a day  potassium chloride    Tablet ER 10 milliEquivalent(s) Oral daily    Vital Signs Last 24 Hrs  T(F): 97.7 (2019 08:49), Max: 100.1 (2019 19:00)  HR: 99 (2019 08:49) (96 - 122)  BP: 137/74 (2019 08:49) (124/81 - 166/96)  RR: 16 (2019 08:49) (16 - 27)  SpO2: 96% (2019 08:49) (92% - 100%)  I&O's Summary      PHYSICAL EXAM:  General: NAD, A/O x 3  ENT: MMM  Neck:   Lungs: Clear to auscultation bilaterally (Anteriorly)   Cardio: RR, S1/S2, +murmurs  Abdomen: Soft, NT/ND, Normal active Bowel Sounds   Extremities: No cyanosis, No edema    LABS:                        12.3   9.48  )-----------( 307      ( 2019 07:00 )             36.8     07-    135  |  98  |  12  ----------------------------<  134  3.6   |  25  |  0.79    Ca    8.8      2019 07:00  Mg     1.9     -    TPro  7.5  /  Alb  3.6  /  TBili  0.6  /  DBili  x   /  AST  23  /  ALT  30  /  AlkPhos  164  07-03    eGFR if Non African American: 71 mL/min/1.73M2 (19 @ 07:00)  eGFR if : 83 mL/min/1.73M2 (19 @ 07:00)    PT/INR - ( 2019 19:35 )   PT: 12.0 sec;   INR: 1.07 ratio         PTT - ( 2019 19:35 )  PTT:28.0 sec  Lactate, Blood: 1.8 mmol/L ( @ 19:35)    CARDIAC MARKERS ( 2019 07:00 )  1.536 ng/mL / x     / x     / x     / x      CARDIAC MARKERS ( 2019 02:00 )  1.204 ng/mL / x     / x     / x     / x      CARDIAC MARKERS ( 2019 19:35 )  .878 ng/mL / x     / x     / x     / x            Urinalysis Basic - ( 2019 21:15 )  Color: Yellow / Appearance: slightly cloudy / S.030 / pH: x  Gluc: x / Ketone: Moderate  / Bili: Negative / Urobili: Negative   Blood: x / Protein: 30 mg/dL / Nitrite: Negative   Leuk Esterase: Small / RBC: 0-4 /HPF / WBC 6-10 /HPF   Sq Epi: x / Non Sq Epi: Neg.-Few / Bacteria: Few /HPF          RADIOLOGY & ADDITIONAL TESTS:  < from: CT Angio Chest w/ IV Cont (19 @ 22:20) >  Emboli in bilateral upper lobe, right middle lobe and right lower lobe   segmental pulmonary arteries. Nonspecificinterlobular septal thickening   and groundglass/patchy opacities in bilateral upper and lower lobes,   which can be seen in setting of pulmonary edema given cardiomegaly and   pleural effusion although pneumonia cannot be excluded. Please note that   pulmonary infarct cannot be excluded in this setting.      Nonspecific mediastinal and right hilar lymphadenopathy.    Additional findings as described.    < from: Xray Chest 2 Views PA/Lat (19 @ 20:04) >  Right lung infiltrate with underlying chronic lung disease and small   bilateral pleural effusions.      Care Discussed with Consultants/Other Providers: Dr. Costa.

## 2019-07-04 NOTE — CONSULT NOTE ADULT - SUBJECTIVE AND OBJECTIVE BOX
PULMONARY CONSULT  Location of Patient :  [facility] ( TE[facility])  Attending requesting Consult:Camila Fitzgerald  Chief Complaint : Chest pressure  Reason For consult : PE      Initial HPI on admission:  HPI:  78  year old female who states no PMH, seen MD 1.5 years ago, no Colonoscopy who p/w 2-3 days of chest pressure, plurititic chest tightness, with SOB/GRADY.  no fever chills, cough, secretions/phlegm  noted to PE on CT    BRIEF HOSPITAL COURSE:   while in hospital started on a/c  work see below  at this time states at rest feels well no complaints  denies recent travel, sick contacts, injuries      PAST MEDICAL & SURGICAL HISTORY:  No pertinent past medical history  No significant past surgical history    Allergies    No Known Allergies    Intolerances      FAMILY HISTORY:    Social history:      Smoking: Never     Drinking: never     Drug use: never    Review of Systems:  CONSTITUTIONAL: No fever, No chills, ?fatigue  EYES: No eye pain, No visual disturbances, No discharge  ENMT:  No difficulty hearing, No tinnitus, No vertigo; No sinus or throat pain  NECK: No pain or stiffness  RESPIRATORY:as abopve  CARDIOVASCULAR: +chest pain, No palpitations, No dizziness, or No leg swelling  GASTROINTESTINAL: No abdominal or epigastric pain. No nausea, No vomiting, No hematemesis; No diarrhea or constipation. No melena, No hematochezia.  GENITOURINARY: No dysuria, No frequency, No hematuria, No incontinence  NEUROLOGICAL: No headaches, No memory loss, No loss of strength, No numbness, No tremors  SKIN: No itching, No burning, No rashes, No lesions   MUSCULOSKELETAL: No joint pain or swelling; No muscle, back, No extremity pain  PSYCHIATRIC: No depression, No anxiety, No mood swings, No difficulty sleeping    Medications:  MEDICATIONS  (STANDING):  aspirin enteric coated 81 milliGRAM(s) Oral daily  enoxaparin Injectable 75 milliGRAM(s) SubCutaneous every 12 hours  furosemide   Injectable 40 milliGRAM(s) IV Push daily  metoprolol tartrate 12.5 milliGRAM(s) Oral two times a day  potassium chloride    Tablet ER 10 milliEquivalent(s) Oral daily    MEDICATIONS  (PRN):  acetaminophen   Tablet .. 650 milliGRAM(s) Oral every 6 hours PRN Temp greater or equal to 38C (100.4F), Mild Pain (1 - 3)      Home Medications:  Last Order Reconciliation Date: Not Done  Multiple Vitamins oral tablet: 1 tab(s) orally once a day (19 @ 01:03)      LABS:                        12.3   9.48  )-----------( 307      ( 2019 07:00 )             36.8         135  |  98  |  12  ----------------------------<  134<H>  3.6   |  25  |  0.79    Ca    8.8      2019 07:00  Mg     1.9         TPro  7.5  /  Alb  3.6  /  TBili  0.6  /  DBili  x   /  AST  23  /  ALT  30  /  AlkPhos  164<H>  03      CARDIAC MARKERS ( 2019 07:00 )  1.536 ng/mL / x     / x     / x     / x      CARDIAC MARKERS ( 2019 02:00 )  1.204 ng/mL / x     / x     / x     / x      CARDIAC MARKERS ( 2019 19:35 )  .878 ng/mL / x     / x     / x     / x            PT/INR - ( 2019 19:35 )   PT: 12.0 sec;   INR: 1.07 ratio         PTT - ( 2019 19:35 )  PTT:28.0 sec  Urinalysis Basic - ( 2019 21:15 )    Color: Yellow / Appearance: slightly cloudy / S.030 / pH: x  Gluc: x / Ketone: Moderate  / Bili: Negative / Urobili: Negative   Blood: x / Protein: 30 mg/dL / Nitrite: Negative   Leuk Esterase: Small / RBC: 0-4 /HPF / WBC 6-10 /HPF   Sq Epi: x / Non Sq Epi: Neg.-Few / Bacteria: Few /HPF        Serum Pro-Brain Natriuretic Peptide: 8480 pg/mL (19 @ 19:35)  19 @ 07:00  [id number]/ Trop I -  --  - --  -  --  /  1.536<H>  19 @ 02:00  [id number]-DYMTD/ Trop I -  --  - --  -  --  /  1.204<H>  19 @ 19:35  [id number]/ Trop I -  --  - --  -  --  /  .878<H>          RADIOLOGY  CXR:  < from: Xray Chest 2 Views PA/Lat (19 @ 20:04) >    INTERPRETATION:  cough    PA and lateral radiographs of the chest demonstrate fibrotic changes in both lungs. There is patchy airspace disease in the right lung suggesting superimposed pneumonia..      The costophrenic angles are mildly blunted with small bilateral pleural effusions.      Heart is borderline prominent    No pneumothorax noted    Mild scoliosis with degenerative change    IMPRESSION:  Right lung infiltrate with underlying chronic lung disease and small bilateral pleural effusions.    < end of copied text >      CT:  < from: CT Angio Chest w/ IV Cont (19 @ 22:20) >  EXAM:  CT ANGIO CHEST (W)AW IC      PROCEDURE DATE:  2019        INTERPRETATION:  CLINICAL INFORMATION: Shortness of breath, assess PE.     PROCEDURE: CT angiography of the chest was performed with intravenous contrast utilizing dedicated PE protocol. 75 mls of Omnipaque-350 administered without complication. 25 ml discarded. Coronal and sagittal   reconstruction images were obtained. Axial MIP images were obtained from a separate workstation.      COMPARISON: None.    FINDINGS: Thepatient's respiratory motion degrades images.    LUNGS AND AIRWAYS: Patent central airways. Nonspecific interlobular septal thickening. Groundglass/patchy opacities in bilateral upper and lower lobes and lingula.  PLEURA: No pneumothorax. Small to moderate right and small left pleural effusion.  HEART: Mild cardiomegaly. No pericardial effusion.    VESSELS: Adequate contrast opacification to the level of the segmental pulmonary arteries. Emboli in bilateral upper lobe, right middle lobe and right lower lobe segmental pulmonary arteries. Main pulmonary artery   enlargement (3.2 cm), suggestive of pulmonary arterial hypertension. Mild contrast reflux into the azygos vein and IVC without significant interventricular septal straightening/bowing. Atherosclerotic change of   the thoracic aorta, great vessel origin and coronary arteries.    CHEST WALL AND LOWER NECK: Within normal limits.   MEDIASTINUM AND LUIS: A 2.0 x 1.6 cm right paratracheal lymph node. A 1.2 x 2.0 cm right hilarlymph node.  UPPER ABDOMEN: Grossly unremarkable.   BONES: S-shaped curvature and degenerative changes of the spine.     IMPRESSION:    Emboli in bilateral upper lobe, right middle lobe and right lower lobe segmental pulmonary arteries. Nonspecificinterlobular septal thickening and groundglass/patchy opacities in bilateral upper and lower lobes,   which can be seen in setting of pulmonary edema given cardiomegaly and pleural effusion although pneumonia cannot be excluded. Please note that pulmonary infarct cannot be excluded in this setting.      Nonspecific mediastinal and right hilar lymphadenopathy.  < end of copied text >    ECHO:  < from: TTE Echo Complete w/Doppler (19 @ 09:16) >  Summary:   1. Left ventricular ejection fraction, by visual estimation, is 25%.   2. Severely decreased global left ventricular systolic function.   3. Spectral Doppler shows restrictive pattern of left ventricular myocardial filling (Grade III diastolic dysfunction).   4. Moderately reduced RV systolic function.   5. Large pleural effusion in both left and right lateral regions.   6. Mild thickening and calcification of the anterior and posterior mitral valve leaflets.   7. Mild-moderate tricuspid regurgitation.   8. Mild to moderate aortic regurgitation.   9. Estimated pulmonary artery systolic pressure is 66.7 mmHg assuming a right atrial pressure of 10 mmHg, which is consistent withmoderate pulmonary hypertension.  10. Pulmonary hypertension is present.  11. LA volume Index is 47.1 ml/m² ml/m2.  12. Peak transaortic gradient equals 10.7 mmHg, mean transaortic gradient equals 5.7 mmHg, the calculated aortic valve area equals1.72 cm² by the continuity equation consistent with mild aortic stenosis.      < end of copied text >    US  < from: US Duplex Venous Lower Ext Complete, Bilateral (19 @ 10:57) >  IMPRESSION:  No evidence of DVT.      < end of copied text >    EKG  < from: 12 Lead ECG (19 @ 21:20) >  Diagnosis Line Sinus tachycardia with fusion complexes  Possible Left atrial enlargement  consider  Septal infarct (cited on or before 2019)  ST and T wave abnormality, consider lateral ischemia  Prolonged QT  Abnormal ECG  When compared with ECG of 2019 18:48,  fusion complexes are now present    < end of copied text >    < from: 12 Lead ECG (19 @ 18:48) >  Diagnosis Line Sinus tachycardia  Possible Left atrial enlargement  Poor R wave progression, consider anteroseptal MI.  ST and T wave abnormality, consider inferolateral ischemia  Abnormal ECG    < end of copied text >        VITALS:  T(C): 36.5 (19 @ 08:49), Max: 37.8 (19 @ 19:00)  T(F): 97.7 (19 @ 08:49), Max: 100.1 (19 @ 19:00)  HR: 99 (19 @ 08:49) (96 - 122)  BP: 137/74 (19 @ 08:49) (124/81 - 166/96)  BP(mean): --  ABP: --  ABP(mean): --  RR: 16 (19 @ 08:49) (16 - 27)  SpO2: 96% (19 @ 08:49) (92% - 100%)  CVP(mm Hg): --  CVP(cm H2O): --    Ins and Outs     19 @ 07:01  -  19 @ 11:11  --------------------------------------------------------  IN: 200 mL / OUT: 0 mL / NET: 200 mL        Height (cm): 170.18 (19 @ 02:21)  Weight (kg): 76.5 (19 @ 02:21)  BMI (kg/m2): 26.4 (19 @ 02:21)        I&O's Detail    2019 07:01  -  2019 11:11  --------------------------------------------------------  IN:    Oral Fluid: 200 mL  Total IN: 200 mL    OUT:  Total OUT: 0 mL    Total NET: 200 mL

## 2019-07-04 NOTE — H&P ADULT - RS GEN PE MLT RESP DETAILS PC
respirations non-labored/decreased breath sounds at bases/airway patent/breath sounds equal/good air movement

## 2019-07-04 NOTE — CONSULT NOTE ADULT - ASSESSMENT
Physical Examination:  GENERAL:               Alert, Oriented, No acute distress.    HEENT:                   +JVD, Moist MM  PULM:                     Bilateral air entry, Clear to auscultation bilaterally, no significant sputum production, No Rales, No Rhonchi, No Wheezing  CVS:                         S1, S2,  +Murmur  ABD:                        Soft, nondistended, nontender, normoactive bowel sounds,   EXT:                         Mild edema, nontender, No Cyanosis or Clubbing   Vascular:                Warm Extremities, Normal Capillary refill, Normal Distal Pulses  NEURO:                  Alert, oriented, interactive, nonfocal, follows commands  PSYC:                      Calm, + Insight.      Assessment  1. Acute PE  2. Acute Systolic CHF ? of baseline with underlying VHD   3. NSTEMI  4. Abnormal CT chest with + Lymphadenopathy non specific, with b/l Pl effusion suspect from CHF    Plan  Continue a/c with Lovenox  Continue diuresis  will recommend to obtain hem onc eval, for lymphadenopathy, hypercoagulable state may have underlying malignancy as cause of unprovoked PE  patient is not up to date with prophylactic cancer screenings. ? Role of CT abd pelvis.   Appreciate cardio input for optimizing cardiac meds, ? role for Cath. trend cardiac enzymes  at this time patient not SOB and on anticoagulation, will not do diagnostic/ therapeutic thoracostenoses Physical Examination:  GENERAL:               Alert, Oriented, No acute distress.    HEENT:                   +JVD, Moist MM  PULM:                     Bilateral air entry, Clear to auscultation bilaterally, no significant sputum production, No Rales, No Rhonchi, No Wheezing  CVS:                         S1, S2,  +Murmur  ABD:                        Soft, nondistended, nontender, normoactive bowel sounds,   EXT:                         Mild edema, nontender, No Cyanosis or Clubbing   Vascular:                Warm Extremities, Normal Capillary refill, Normal Distal Pulses  NEURO:                  Alert, oriented, interactive, nonfocal, follows commands  PSYC:                      Calm, + Insight.      Assessment  1. Acute PE  2. Acute Systolic CHF ? of baseline with underlying VHD   3. NSTEMI  4. Abnormal CT chest with + Lymphadenopathy non specific, with b/l Pl effusion suspect from CHF    Plan  Continue a/c with Lovenox  Continue diuresis  will recommend to obtain hem onc eval, for lymphadenopathy, hypercoagulable state may have underlying malignancy as cause of unprovoked PE  patient is not up to date with prophylactic cancer screenings. ? Role of CT abd pelvis.   Appreciate cardio input for optimizing cardiac meds, ? role for Cath. trend cardiac enzymes  at this time patient not SOB and on anticoagulation, will not do diagnostic/ therapeutic thoracentesis

## 2019-07-04 NOTE — CONSULT NOTE ADULT - ASSESSMENT
Acute pulmonary embolism  Elevated may be related to above, consider possible concurrent MI.  Elevated BP  SVT    Suggest full anticoagulation, currently on Lovenox. Consider oral a/c such as Eliquis or Xarelto.  Echo  Telemetry  Venous Duplex    Consider for coronary ischemia work up when stable and after review of echo. Acute pulmonary embolism  ElevatedTn  may be related to above, consider possible concurrent MI or Takotsubo.  Elevated BP  SVT  CHF    Suggest full anticoagulation, currently on Lovenox. Consider oral a/c such as Eliquis or Xarelto.  Echo  Telemetry  Venous Duplex    Consider for coronary ischemia work up when stable and after review of echo.    10:10 AM  Addendum:  Echo shows severe LV dysfunction and MR, also RV dysfunction mild to moderate AS and PHT ( moderate to severe)    Discussed in detail with patient.  Recommended cardiac cath possible PCI or OHS if warranted, versus medical RX.  Patient considering.    D/w Heather Cordero NP

## 2019-07-05 ENCOUNTER — TRANSCRIPTION ENCOUNTER (OUTPATIENT)
Age: 79
End: 2019-07-05

## 2019-07-05 ENCOUNTER — INPATIENT (INPATIENT)
Facility: HOSPITAL | Age: 79
LOS: 10 days | Discharge: HOME | DRG: 215 | End: 2019-07-16
Attending: HOSPITALIST | Admitting: INTERNAL MEDICINE
Payer: MEDICARE

## 2019-07-05 VITALS
DIASTOLIC BLOOD PRESSURE: 83 MMHG | TEMPERATURE: 98 F | HEART RATE: 100 BPM | SYSTOLIC BLOOD PRESSURE: 134 MMHG | RESPIRATION RATE: 16 BRPM | OXYGEN SATURATION: 95 %

## 2019-07-05 VITALS
HEIGHT: 67 IN | SYSTOLIC BLOOD PRESSURE: 143 MMHG | DIASTOLIC BLOOD PRESSURE: 98 MMHG | HEART RATE: 110 BPM | OXYGEN SATURATION: 97 % | TEMPERATURE: 97 F | RESPIRATION RATE: 18 BRPM | WEIGHT: 141.98 LBS

## 2019-07-05 DIAGNOSIS — I26.99 OTHER PULMONARY EMBOLISM WITHOUT ACUTE COR PULMONALE: ICD-10-CM

## 2019-07-05 DIAGNOSIS — Z29.9 ENCOUNTER FOR PROPHYLACTIC MEASURES, UNSPECIFIED: ICD-10-CM

## 2019-07-05 DIAGNOSIS — I21.4 NON-ST ELEVATION (NSTEMI) MYOCARDIAL INFARCTION: ICD-10-CM

## 2019-07-05 DIAGNOSIS — I50.20 UNSPECIFIED SYSTOLIC (CONGESTIVE) HEART FAILURE: ICD-10-CM

## 2019-07-05 LAB
ANION GAP SERPL CALC-SCNC: 9 MMOL/L — SIGNIFICANT CHANGE UP (ref 5–17)
APTT BLD: 28.2 SEC — SIGNIFICANT CHANGE UP (ref 27.5–36.3)
BUN SERPL-MCNC: 13 MG/DL — SIGNIFICANT CHANGE UP (ref 7–23)
CALCIUM SERPL-MCNC: 9 MG/DL — SIGNIFICANT CHANGE UP (ref 8.4–10.5)
CHLORIDE SERPL-SCNC: 99 MMOL/L — SIGNIFICANT CHANGE UP (ref 96–108)
CO2 SERPL-SCNC: 27 MMOL/L — SIGNIFICANT CHANGE UP (ref 22–31)
CREAT SERPL-MCNC: 0.85 MG/DL — SIGNIFICANT CHANGE UP (ref 0.5–1.3)
CULTURE RESULTS: SIGNIFICANT CHANGE UP
GLUCOSE SERPL-MCNC: 97 MG/DL — SIGNIFICANT CHANGE UP (ref 70–99)
HCT VFR BLD CALC: 36.4 % — SIGNIFICANT CHANGE UP (ref 34.5–45)
HGB BLD-MCNC: 12 G/DL — SIGNIFICANT CHANGE UP (ref 11.5–15.5)
INR BLD: 1.1 RATIO — SIGNIFICANT CHANGE UP (ref 0.88–1.16)
MCHC RBC-ENTMCNC: 32.3 PG — SIGNIFICANT CHANGE UP (ref 27–34)
MCHC RBC-ENTMCNC: 33 GM/DL — SIGNIFICANT CHANGE UP (ref 32–36)
MCV RBC AUTO: 98.1 FL — SIGNIFICANT CHANGE UP (ref 80–100)
NRBC # BLD: 0 /100 WBCS — SIGNIFICANT CHANGE UP (ref 0–0)
PLATELET # BLD AUTO: 304 K/UL — SIGNIFICANT CHANGE UP (ref 150–400)
POTASSIUM SERPL-MCNC: 4.5 MMOL/L — SIGNIFICANT CHANGE UP (ref 3.5–5.3)
POTASSIUM SERPL-SCNC: 4.5 MMOL/L — SIGNIFICANT CHANGE UP (ref 3.5–5.3)
PROTHROM AB SERPL-ACNC: 12.6 SEC — SIGNIFICANT CHANGE UP (ref 10–12.9)
RBC # BLD: 3.71 M/UL — LOW (ref 3.8–5.2)
RBC # FLD: 12.9 % — SIGNIFICANT CHANGE UP (ref 10.3–14.5)
SODIUM SERPL-SCNC: 135 MMOL/L — SIGNIFICANT CHANGE UP (ref 135–145)
SPECIMEN SOURCE: SIGNIFICANT CHANGE UP
WBC # BLD: 10.05 K/UL — SIGNIFICANT CHANGE UP (ref 3.8–10.5)
WBC # FLD AUTO: 10.05 K/UL — SIGNIFICANT CHANGE UP (ref 3.8–10.5)

## 2019-07-05 PROCEDURE — 81001 URINALYSIS AUTO W/SCOPE: CPT

## 2019-07-05 PROCEDURE — 93458 L HRT ARTERY/VENTRICLE ANGIO: CPT | Mod: 26,GC

## 2019-07-05 PROCEDURE — 84484 ASSAY OF TROPONIN QUANT: CPT

## 2019-07-05 PROCEDURE — 83605 ASSAY OF LACTIC ACID: CPT

## 2019-07-05 PROCEDURE — 83880 ASSAY OF NATRIURETIC PEPTIDE: CPT

## 2019-07-05 PROCEDURE — 85027 COMPLETE CBC AUTOMATED: CPT

## 2019-07-05 PROCEDURE — 71275 CT ANGIOGRAPHY CHEST: CPT

## 2019-07-05 PROCEDURE — 99239 HOSP IP/OBS DSCHRG MGMT >30: CPT

## 2019-07-05 PROCEDURE — 93005 ELECTROCARDIOGRAM TRACING: CPT

## 2019-07-05 PROCEDURE — 96375 TX/PRO/DX INJ NEW DRUG ADDON: CPT

## 2019-07-05 PROCEDURE — 80053 COMPREHEN METABOLIC PANEL: CPT

## 2019-07-05 PROCEDURE — 93970 EXTREMITY STUDY: CPT

## 2019-07-05 PROCEDURE — 99291 CRITICAL CARE FIRST HOUR: CPT | Mod: 25

## 2019-07-05 PROCEDURE — 99152 MOD SED SAME PHYS/QHP 5/>YRS: CPT | Mod: GC

## 2019-07-05 PROCEDURE — 93306 TTE W/DOPPLER COMPLETE: CPT

## 2019-07-05 PROCEDURE — 96367 TX/PROPH/DG ADDL SEQ IV INF: CPT

## 2019-07-05 PROCEDURE — 71046 X-RAY EXAM CHEST 2 VIEWS: CPT

## 2019-07-05 PROCEDURE — 85730 THROMBOPLASTIN TIME PARTIAL: CPT

## 2019-07-05 PROCEDURE — 93010 ELECTROCARDIOGRAM REPORT: CPT

## 2019-07-05 PROCEDURE — 80061 LIPID PANEL: CPT

## 2019-07-05 PROCEDURE — 99223 1ST HOSP IP/OBS HIGH 75: CPT | Mod: GC

## 2019-07-05 PROCEDURE — 83735 ASSAY OF MAGNESIUM: CPT

## 2019-07-05 PROCEDURE — 99233 SBSQ HOSP IP/OBS HIGH 50: CPT

## 2019-07-05 PROCEDURE — 85610 PROTHROMBIN TIME: CPT

## 2019-07-05 PROCEDURE — 93306 TTE W/DOPPLER COMPLETE: CPT | Mod: 26

## 2019-07-05 PROCEDURE — 84443 ASSAY THYROID STIM HORMONE: CPT

## 2019-07-05 PROCEDURE — 87040 BLOOD CULTURE FOR BACTERIA: CPT

## 2019-07-05 PROCEDURE — 36415 COLL VENOUS BLD VENIPUNCTURE: CPT

## 2019-07-05 PROCEDURE — 80048 BASIC METABOLIC PNL TOTAL CA: CPT

## 2019-07-05 PROCEDURE — 87086 URINE CULTURE/COLONY COUNT: CPT

## 2019-07-05 PROCEDURE — 96365 THER/PROPH/DIAG IV INF INIT: CPT

## 2019-07-05 RX ORDER — METOPROLOL TARTRATE 50 MG
25 TABLET ORAL DAILY
Refills: 0 | Status: DISCONTINUED | OUTPATIENT
Start: 2019-07-05 | End: 2019-07-05

## 2019-07-05 RX ORDER — ATORVASTATIN CALCIUM 80 MG/1
40 TABLET, FILM COATED ORAL AT BEDTIME
Refills: 0 | Status: DISCONTINUED | OUTPATIENT
Start: 2019-07-05 | End: 2019-07-16

## 2019-07-05 RX ORDER — ENOXAPARIN SODIUM 100 MG/ML
75 INJECTION SUBCUTANEOUS
Qty: 0 | Refills: 0 | DISCHARGE
Start: 2019-07-05

## 2019-07-05 RX ORDER — METOPROLOL TARTRATE 50 MG
1 TABLET ORAL
Qty: 0 | Refills: 0 | DISCHARGE
Start: 2019-07-05

## 2019-07-05 RX ORDER — FUROSEMIDE 40 MG
40 TABLET ORAL ONCE
Refills: 0 | Status: COMPLETED | OUTPATIENT
Start: 2019-07-05 | End: 2019-07-05

## 2019-07-05 RX ORDER — LISINOPRIL 2.5 MG/1
2.5 TABLET ORAL DAILY
Refills: 0 | Status: DISCONTINUED | OUTPATIENT
Start: 2019-07-05 | End: 2019-07-06

## 2019-07-05 RX ORDER — ASPIRIN/CALCIUM CARB/MAGNESIUM 324 MG
1 TABLET ORAL
Qty: 0 | Refills: 0 | DISCHARGE
Start: 2019-07-05

## 2019-07-05 RX ORDER — ASPIRIN/CALCIUM CARB/MAGNESIUM 324 MG
81 TABLET ORAL DAILY
Refills: 0 | Status: DISCONTINUED | OUTPATIENT
Start: 2019-07-05 | End: 2019-07-16

## 2019-07-05 RX ORDER — CLOPIDOGREL BISULFATE 75 MG/1
1 TABLET, FILM COATED ORAL
Qty: 0 | Refills: 0 | DISCHARGE

## 2019-07-05 RX ORDER — FUROSEMIDE 40 MG
40 TABLET ORAL DAILY
Refills: 0 | Status: DISCONTINUED | OUTPATIENT
Start: 2019-07-05 | End: 2019-07-12

## 2019-07-05 RX ORDER — POTASSIUM CHLORIDE 20 MEQ
1 PACKET (EA) ORAL
Qty: 0 | Refills: 0 | DISCHARGE
Start: 2019-07-05

## 2019-07-05 RX ORDER — LISINOPRIL 2.5 MG/1
2.5 TABLET ORAL DAILY
Refills: 0 | Status: DISCONTINUED | OUTPATIENT
Start: 2019-07-05 | End: 2019-07-05

## 2019-07-05 RX ORDER — LISINOPRIL 2.5 MG/1
1 TABLET ORAL
Qty: 0 | Refills: 0 | DISCHARGE
Start: 2019-07-05

## 2019-07-05 RX ORDER — ATORVASTATIN CALCIUM 80 MG/1
1 TABLET, FILM COATED ORAL
Qty: 0 | Refills: 0 | DISCHARGE
Start: 2019-07-05

## 2019-07-05 RX ORDER — ATORVASTATIN CALCIUM 80 MG/1
40 TABLET, FILM COATED ORAL AT BEDTIME
Refills: 0 | Status: DISCONTINUED | OUTPATIENT
Start: 2019-07-05 | End: 2019-07-05

## 2019-07-05 RX ORDER — HEPARIN SODIUM 5000 [USP'U]/ML
2500 INJECTION INTRAVENOUS; SUBCUTANEOUS EVERY 6 HOURS
Refills: 0 | Status: DISCONTINUED | OUTPATIENT
Start: 2019-07-05 | End: 2019-07-14

## 2019-07-05 RX ORDER — HEPARIN SODIUM 5000 [USP'U]/ML
INJECTION INTRAVENOUS; SUBCUTANEOUS
Qty: 25000 | Refills: 0 | Status: DISCONTINUED | OUTPATIENT
Start: 2019-07-05 | End: 2019-07-10

## 2019-07-05 RX ORDER — HEPARIN SODIUM 5000 [USP'U]/ML
5000 INJECTION INTRAVENOUS; SUBCUTANEOUS EVERY 6 HOURS
Refills: 0 | Status: DISCONTINUED | OUTPATIENT
Start: 2019-07-05 | End: 2019-07-14

## 2019-07-05 RX ORDER — METOPROLOL TARTRATE 50 MG
25 TABLET ORAL DAILY
Refills: 0 | Status: DISCONTINUED | OUTPATIENT
Start: 2019-07-05 | End: 2019-07-09

## 2019-07-05 RX ADMIN — Medication 40 MILLIGRAM(S): at 05:39

## 2019-07-05 RX ADMIN — Medication 81 MILLIGRAM(S): at 13:54

## 2019-07-05 RX ADMIN — Medication 40 MILLIGRAM(S): at 18:30

## 2019-07-05 RX ADMIN — Medication 12.5 MILLIGRAM(S): at 05:39

## 2019-07-05 RX ADMIN — HEPARIN SODIUM 1100 UNIT(S)/HR: 5000 INJECTION INTRAVENOUS; SUBCUTANEOUS at 13:53

## 2019-07-05 RX ADMIN — LISINOPRIL 2.5 MILLIGRAM(S): 2.5 TABLET ORAL at 13:54

## 2019-07-05 NOTE — PROGRESS NOTE ADULT - PROBLEM SELECTOR PLAN 3
CTA showed PE involving b/l upper lobes, R middle and lower lobe segmental pulmonary arteries. b/l LE dopplers negative for DVT.  - c/w hep gtt for now CTA showed PE involving b/l upper lobes, R middle and lower lobe segmental pulmonary arteries. b/l LE dopplers negative for DVT.  - appears unprovoked but rec outpt age appropriate malignancy evaluation   - c/w hep gtt for now, hold off on transition to oral pending possible inpatient PCI CTA showed PE involving b/l upper lobes, R middle and lower lobe segmental pulmonary arteries. b/l LE dopplers negative for DVT.  - Patient denies history of blood clots, tobacco use, immobility, recent long-duration of travel, trauma, hormone use, or family history of blood clots. She reports she had normal pap smears in the past. She has never had a colonoscopy or mammogram. This patient will need age appropriate cancer screening.   - c/w hep gtt for now, hold off on transition to oral pending possible inpatient PCI

## 2019-07-05 NOTE — PROGRESS NOTE ADULT - PROBLEM SELECTOR PLAN 2
TTE EF 25%, severe global LVSD, grade III diastolic dysfunction, moderately reduced RVSF, mild-mod TR, mild-mod AR, mild AS, and mod pHTN. CTA with b/l pleff and likely pulm edema. s/p IV diuresis.  - breathing comfortably sating mid 90s on RA. Trace LE edema.  - c/w lasix 40 IV daily per cards, monitor I/O  - c/w toprol 25 daily and lisinopril 2.5 daily per cards  - will need eventual repeat TTE TTE EF 25%, severe global LVSD, grade III diastolic dysfunction, moderately reduced RVSF, mild-mod TR, mild-mod AR, mild AS, and mod pHTN. CTA with b/l pleff and likely pulm edema. s/p IV diuresis.  - breathing comfortably sating mid 90s on RA. Trace LE edema.  - c/w lasix 40 IV daily per cards, monitor I/O  - c/w toprol 25 daily and lisinopril 2.5 daily per cards  - will need eventual repeat TTE  - f/u cards recs TTE EF 25%, severe global LVSD, grade III diastolic dysfunction, moderately reduced RVSF, mild-mod TR, mild-mod AR, mild AS, and mod pHTN. CTA with b/l pleff and likely pulm edema. s/p IV diuresis.  - breathing comfortably sating mid 90s on RA. Trace LE edema.  - c/w lasix 40 IV daily per cards, monitor I/O  - c/w toprol 25 daily and lisinopril 2.5 daily per cards  - repeat TTE  - f/u cards recs TTE EF 25%, severe global LVSD, grade III diastolic dysfunction, moderately reduced RVSF, mild-mod TR, mild-mod AR, mild AS, and mod pHTN. CTA with b/l pleural effusions and likely pulm edema. s/p IV diuresis.  - breathing comfortably sating mid 90s on RA. Trace LE edema. She appears euvolemic at this time  - c/w lasix 40 IV daily per cards, monitor I/O  - c/w toprol 25 daily and lisinopril 2.5 daily per cards  - repeat TTE  - f/u cardiology recommendations.

## 2019-07-05 NOTE — PROGRESS NOTE ADULT - SUBJECTIVE AND OBJECTIVE BOX
Follow-up Critical Care Progress Note  Chief Complaint : Sepsis      pt feels well, no cp, palp, n/v  comfortable         Allergies :No Known Allergies      PAST MEDICAL & SURGICAL HISTORY:  No pertinent past medical history  No significant past surgical history      Medications:  MEDICATIONS  (STANDING):  aspirin enteric coated 81 milliGRAM(s) Oral daily  enoxaparin Injectable 75 milliGRAM(s) SubCutaneous every 12 hours  furosemide   Injectable 40 milliGRAM(s) IV Push daily  metoprolol tartrate 12.5 milliGRAM(s) Oral two times a day  potassium chloride    Tablet ER 10 milliEquivalent(s) Oral daily    MEDICATIONS  (PRN):  acetaminophen   Tablet .. 650 milliGRAM(s) Oral every 6 hours PRN Temp greater or equal to 38C (100.4F), Mild Pain (1 - 3)      LABS:                        12.0   10.05 )-----------( 304      ( 2019 05:10 )             36.4     07-05    135  |  99  |  13  ----------------------------<  97  4.5   |  27  |  0.85    Ca    9.0      2019 05:10  Mg     1.9     07-04    TPro  7.5  /  Alb  3.6  /  TBili  0.6  /  DBili  x   /  AST  23  /  ALT  30  /  AlkPhos  164<H>  07-03      CARDIAC MARKERS ( 2019 15:35 )  1.558 ng/mL / x     / x     / x     / x      CARDIAC MARKERS ( 2019 07:00 )  1.536 ng/mL / x     / x     / x     / x      CARDIAC MARKERS ( 2019 02:00 )  1.204 ng/mL / x     / x     / x     / x      CARDIAC MARKERS ( 2019 19:35 )  .878 ng/mL / x     / x     / x     / x            PT/INR - ( 2019 19:35 )   PT: 12.0 sec;   INR: 1.07 ratio         PTT - ( 2019 19:35 )  PTT:28.0 sec  Urinalysis Basic - ( 2019 21:15 )    Color: Yellow / Appearance: slightly cloudy / S.030 / pH: x  Gluc: x / Ketone: Moderate  / Bili: Negative / Urobili: Negative   Blood: x / Protein: 30 mg/dL / Nitrite: Negative   Leuk Esterase: Small / RBC: 0-4 /HPF / WBC 6-10 /HPF   Sq Epi: x / Non Sq Epi: Neg.-Few / Bacteria: Few /HPF        Serum Pro-Brain Natriuretic Peptide: 8480 pg/mL (19 @ 19:35)        CULTURES: (if applicable)    Culture - Urine (collected 19 @ 21:15)  Source: .Urine Clean Catch (Midstream)  Final Report (19 @ 09:12):    >=3 organisms. Probable collection contamination.    Culture - Blood (collected 19 @ 19:35)  Source: .Blood Blood-Peripheral  Preliminary Report (19 02:01):    No growth to date.    Culture - Blood (collected 19 19:35)  Source: .Blood Blood-Peripheral  Preliminary Report (19 02:01):    No growth to date.        VITALS:  T(C): 36.4 (19 04:20), Max: 36.7 (19 15:30)  T(F): 97.5 (19 04:20), Max: 98.1 (19 @ 15:30)  HR: 94 (19 04:20) (94 - 103)  BP: 126/85 (19 04:20) (126/85 - 144/77)  BP(mean): --  ABP: --  ABP(mean): --  RR: 17 (19 04:20) (16 - 17)  SpO2: 97% (19 04:20) (94% - 97%)  CVP(mm Hg): --  CVP(cm H2O): --    Ins and Outs     19 @ 07:01  -  19 @ 07:00  --------------------------------------------------------  IN: 625 mL / OUT: 0 mL / NET: 625 mL        Height (cm): 170.18 (19 @ 02:21)  Weight (kg): 76.5 (19 @ 02:21)  BMI (kg/m2): 26.4 (19 @ 02:21)        I&O's Detail    2019 07:01  -  2019 07:00  --------------------------------------------------------  IN:    Oral Fluid: 625 mL  Total IN: 625 mL    OUT:  Total OUT: 0 mL    Total NET: 625 mL

## 2019-07-05 NOTE — PROGRESS NOTE ADULT - ASSESSMENT
77yo female w. no Significant PMH reported, p/w intermittent chest pressure/tightness, dyspnea on exertion x2 days. Admitted w. Acute CHF, +Pulmonary embolism & NSTEMI.    *NSTEMI, cardiomyopathy ?  ASA  will start ACE  change to long acting bb  check lipids, start Lipitor for now  transfer to [place] for cardiac cath with Dr. Ross     *PE  c/w Lovenox  b/l LE Dopplers
77yo female w. no Significant PMH reported, p/w intermittent chest pressure/tightness, dyspnea on exertion x2 days. Admitted w. Acute CHF, +Pulmonary embolism & NSTEMI.    *r/o STEMI, CHF  Telemetry monitor  Cardiology consult  Trend Trops  ASA  Lasix  Metoprolol  TTE    *PE  c/w Lovenox  b/l LE Dopplers
Physical Examination:  GENERAL:               Alert, Oriented, No acute distress.    PULM:                     Bilateral air entry, Clear to auscultation bilaterally, no significant sputum production, No Rales, No Rhonchi, No Wheezing  CVS:                         S1, S2,  +Murmur  ABD:                        Soft, nondistended, nontender, normoactive bowel sounds,   EXT:                         Mild edema, nontender, No Cyanosis or Clubbing   NEURO:                  Alert, oriented, interactive, nonfocal, follows commands  PSYC:                      Calm, + Insight.      Assessment  1. Acute PE  2. Acute Systolic CHF ? of baseline with underlying VHD   3. NSTEMI  4. Abnormal CT chest with + Lymphadenopathy non specific, with b/l Pl effusion suspect from CHF    Plan  Continue a/c with Lovenox  Continue diuresis  heme/onc eval, for lymphadenopathy, hypercoagulable state may have underlying malignancy as cause of unprovoked PE       patient is not up to date with prophylactic cancer screenings. ? Role of CT abd pelvis.   d/w cardio plan for cardiac workup.   at this time patient not SOB and on anticoagulation, will not do diagnostic/ therapeutic thoracentesis will await further imaging with CT abd pelvis
Patient with acute PE, CHF, resolving, + TN and resolved chest pressure.  Probable Non QMI.  LV and RV dysfunction with PHT, and MR.  Clinically stable and improved, venous duplex negative for DVT    Suggest  LA b blocker  lipid  statin  consider ACe/ARB or Entresto.    Discussed extensively with patient....agree to cardiac cath, understanding possibilities of stent, medical rx or even OHS.  Holding lovenox this am    Called Transfer center [facility] to arrange, accepted by Dr. Ross.

## 2019-07-05 NOTE — PROGRESS NOTE ADULT - SUBJECTIVE AND OBJECTIVE BOX
Follow up for   dyspnea, chest pain _ tn LV dysfunction and MR, +PE    SUBJ:   Feels ok, no chest pain, dyspnea    PMH  No pertinent past medical history      MEDICATIONS  (STANDING):  aspirin enteric coated 81 milliGRAM(s) Oral daily  enoxaparin Injectable 75 milliGRAM(s) SubCutaneous every 12 hours  furosemide   Injectable 40 milliGRAM(s) IV Push daily  metoprolol tartrate 12.5 milliGRAM(s) Oral two times a day  potassium chloride    Tablet ER 10 milliEquivalent(s) Oral daily    MEDICATIONS  (PRN):  acetaminophen   Tablet .. 650 milliGRAM(s) Oral every 6 hours PRN Temp greater or equal to 38C (100.4F), Mild Pain (1 - 3)        PHYSICAL EXAM:  Vital Signs Last 24 Hrs  T(C): 36.4 (2019 04:20), Max: 36.7 (2019 15:30)  T(F): 97.5 (2019 04:20), Max: 98.1 (2019 15:30)  HR: 94 (2019 04:20) (94 - 103)  BP: 126/85 (2019 04:20) (126/85 - 144/77)  BP(mean): --  RR: 17 (2019 04:20) (16 - 17)  SpO2: 97% (2019 04:20) (94% - 97%)    GENERAL: NAD, well-groomed, well-developed  HEAD:  Atraumatic, Normocephalic  EYES: EOMI, PERRLA, conjunctiva and sclera clear  ENT: Moist mucous membranes,  NECK: Supple, No JVD, no bruits  CHEST/LUNG: Clear to percussion and auscultation bilaterally; No rales, rhonchi, wheezing, or rubs  HEART: Regular rate and rhythm; No murmurs, rubs, or gallops PMI non displaced.  ABDOMEN: Soft, Nontender, Nondistended; Bowel sounds present  EXTREMITIES:  No clubbing, cyanosis, or edema  SKIN: No rashes or lesions  NERVOUS SYSTEM:  Alert & Oriented X3      TELEMETRY:    sinus, AIVR/VT    ECG:  < from: 12 Lead ECG (19 @ 14:37) >    Ventricular Rate 106 BPM    Atrial Rate 106 BPM    P-R Interval 182 ms    QRS Duration 102 ms    Q-T Interval 352 ms    QTC Calculation(Bezet) 467 ms    P Axis 45 degrees    R Axis 72 degrees    T Axis 178 degrees    Diagnosis Line Sinus tachycardia  Possible Left atrial enlargement  Left ventricular hypertrophy with repolarization abnormality  Abnormal ECG  When compared with ECG of 2019 21:20,  fusion complexes are no longer present  Confirmed by TAMMY NICOLE, REFUGIO CREWS () on 2019 8:51:01 AM    < end of copied text >        LABS:                        12.0   10.05 )-----------( 304      ( 2019 05:10 )             36.4         135  |  99  |  13  ----------------------------<  97  4.5   |  27  |  0.85    Ca    9.0      2019 05:10  Mg     1.9         TPro  7.5  /  Alb  3.6  /  TBili  0.6  /  DBili  x   /  AST  23  /  ALT  30  /  AlkPhos  164<H>  07-03    CARDIAC MARKERS ( 2019 15:35 )  1.558 ng/mL / x     / x     / x     / x      CARDIAC MARKERS ( 2019 07:00 )  1.536 ng/mL / x     / x     / x     / x      CARDIAC MARKERS ( 2019 02:00 )  1.204 ng/mL / x     / x     / x     / x      CARDIAC MARKERS ( 2019 19:35 )  .878 ng/mL / x     / x     / x     / x          PT/INR - ( 2019 19:35 )   PT: 12.0 sec;   INR: 1.07 ratio         PTT - ( 2019 19:35 )  PTT:28.0 sec    I&O's Summary    2019 07:01  -  2019 07:00  --------------------------------------------------------  IN: 625 mL / OUT: 0 mL / NET: 625 mL      BNP    RADIOLOGY & ADDITIONAL STUDIES:  < from: US Duplex Venous Lower Ext Complete, Bilateral (19 @ 10:57) >    EXAM:  US DPLX LWR EXT VEINS COMPL BI      PROCEDURE DATE:  2019        INTERPRETATION:  CLINICAL STATEMENT: Swelling leg.    TECHNIQUE: Ultrasound of bilateral lower extremity deep venous system.    COMPARISON: None.    FINDINGS:  There is color and spectral flow, compression and augmentation of the   common femoral, superficial femoral and popliteal veins.    There is flow in the posterior tibial vein.    IMPRESSION:  No evidence of DVT.      < end of copied text >        ECHO:    < from: TTE Echo Complete w/Doppler (19 @ 09:16) >    EXAM:  ECHO TTE WO CON COMP [id number]      PROCEDURE DATE:  2019        INTERPRETATION:  REPORT:    TRANSTHORACIC ECHOCARDIOGRAM REPORT         Patient Name:   MAY CALVILLO Patient Location: [facility] [address]  Medical Rec #:  [id number]      Accession #:      [id number]  Account #:      [id number]       Height:           67.0 in 170.2 cm  YOB: 1940      Weight:           160.1 lb 72.60 kg  Patient Age:    78 years      BSA:              1.84 m²  Patient Gender: F             BP:    137/74 mmHg       Date of Exam:        2019 9:16:27 AM  Sonographer:         LEWIS  Referring Physician: TALHA    Procedure:     2D Echo/Doppler/Color Doppler Complete.  Indications:   Chest pain, unspecified - R07.9  Diagnosis:     Cardiomyopathy, unspecified - I42.9  Study Details: Technically good study.         2D AND M-MODE MEASUREMENTS (normal ranges within parentheses):  Left Ventricle:                  Normal   Aorta/Left Atrium:               Normal  IVSd (2D):              1.43 cm (0.7-1.1) Aortic Root (Mmode): 2.67 cm   (2.4-3.7)  LVPWd (2D):             1.27 cm (0.7-1.1) AoV Cusp Separation: 1.03 cm   (1.5-2.6)  LVIDd (2D):             5.01 cm (3.4-5.7) Left Atrium (Mmode): 3.34 cm   (1.9-4.0)  LVIDs (2D):             4.34 cm  LV FS (2D):             13.4 %   (>25%)  Relative Wall Thickness  0.51    (<0.42)    LV DIASTOLIC FUNCTION:  MV Peak A: 1.67 m/s Decel Time: 195 msec    SPECTRAL DOPPLER ANALYSIS (where applicable):  Mitral Valve:  MV P1/2 Time: 56.69 msec  MV Area, PHT: 3.88 cm²    Aortic Valve: AoV Max Finesse: 1.64 m/s AoV Peak PG: 10.7 mmHg AoV Mean P.7 mmHg    LVOT Vmax: 1.08 m/s LVOT VTI: 0.158 m LVOT Diameter: 1.83 cm    AoV Area, Vmax: 1.73 cm² AoV Area, VTI: 1.72 cm² AoV Area, Vmn: 1.62 cm²  Ao VTI:0.241  Aortic Insufficiency:  AI Half-time:  210 msec  AI Decel Rate: 5.59 m/s²    Tricuspid Valve and PA/RV Systolic Pressure: TR Max Velocity: 3.76 m/s RA   Pressure: 10 mmHg RVSP/PASP: 66.7 mmHg       PHYSICIAN INTERPRETATION:  Left Ventricle: Theleft ventricular internal cavity size is normal.  Global LV systolic function was severely decreased. Left ventricular   ejection fraction, by visual estimation, is 25%. Spectral Doppler shows   restrictive pattern of left ventricular myocardial filling (Grade III   diastolic dysfunction).  Right Ventricle: The right ventricular size is normal. RV systolic   function is moderately reduced.  Left Atrium: Mildly enlarged left atrium. LA volume Index is 47.1 ml/m²   ml/m2.  Right Atrium: The right atrium is normal in size.  Pericardium: Trivial pericardial effusion is present. The pericardial   effusion is globally located around the entire heart. There is a large   pleural effusion in both left and right lateral regions.  Mitral Valve: Mild thickening and calcification of the anterior and   posterior mitral valve leaflets. Severe mitral valve regurgitation is   seen.  Tricuspid Valve: Structurally normal tricuspid valve, with normal leaflet   excursion. Mild-moderate tricuspid regurgitationis visualized. Estimated   pulmonary artery systolic pressure is 66.7 mmHg assuming a right atrial   pressure of 10 mmHg, which is consistent with moderate pulmonary   hypertension.  Aortic Valve: Peak transaortic gradient equals 10.7 mmHg, mean   transaortic gradient equals 5.7 mmHg, the calculated aortic valve area   equals 1.72 cm² by the continuity equation consistent with mild aortic   stenosis. The peak aortic velocity was obtained from the apical view.   Mild to moderate aortic valve regurgitation is seen.  Pulmonic Valve: Structurally normal pulmonic valve, with normal leaflet   excursion. Mild pulmonic valve regurgitation.  Aorta: The aortic root is normal in size and structure.  Pulmonary Artery: The main pulmonary artery is normal in size. Pulmonary   hypertension is present.       Summary:   1. Left ventricular ejection fraction, by visual estimation, is 25%.   2. Severely decreased global left ventricular systolic function.   3. Spectral Doppler shows restrictive pattern of left ventricular   myocardial filling (Grade III diastolic dysfunction).   4. Moderately reduced RV systolic function.   5. Large pleural effusion in both left and right lateral regions.   6. Mild thickening and calcification of the anterior and posterior   mitral valve leaflets.   7. Mild-moderate tricuspid regurgitation.   8. Mild to moderate aortic regurgitation.   9. Estimated pulmonary artery systolic pressure is 66.7 mmHg assuming a   right atrial pressure of 10 mmHg, which is consistent withmoderate   pulmonary hypertension.  10. Pulmonary hypertension is present.  11. LA volume Index is 47.1 ml/m² ml/m2.  12. Peak transaortic gradient equals 10.7 mmHg, mean transaortic gradient   equals 5.7 mmHg, the calculated aortic valve area equals1.72 cm² by the   continuity equation consistent with mild aortic stenosis.    [id number] Refugio Broussard MD,FACC , Electronically signed on 2019 at   11:08:20 AM       < end of copied text >

## 2019-07-05 NOTE — PROGRESS NOTE ADULT - REASON FOR ADMISSION
worsened chest pressure/tightness for 2-3 days
worsening chest pressure/tightness for 2-3 days
worsened chest pressure/tightness for 2-3 days
worsened chest pressure/tightness for 2-3 days

## 2019-07-05 NOTE — DISCHARGE NOTE PROVIDER - NSDCCPCAREPLAN_GEN_ALL_CORE_FT
PRINCIPAL DISCHARGE DIAGNOSIS  Diagnosis: Acute CHF  Assessment and Plan of Treatment:       SECONDARY DISCHARGE DIAGNOSES  Diagnosis: Congestive heart failure, unspecified HF chronicity, unspecified heart failure type  Assessment and Plan of Treatment:     Diagnosis: NSTEMI (non-ST elevated myocardial infarction)  Assessment and Plan of Treatment:

## 2019-07-05 NOTE — DISCHARGE NOTE PROVIDER - HOSPITAL COURSE
79 y/o F presented with sob, tightness in chest    +PE    +NSTEMI    +severe systolic CHF 25%EF, grade III diastolic, pulmonary HTN         sent to [facility] for cardiac cath    asa/statin/plavix/bb/ACE started

## 2019-07-05 NOTE — DISCHARGE NOTE NURSING/CASE MANAGEMENT/SOCIAL WORK - NSDCDPATPORTLINK_GEN_ALL_CORE
You can access the [facility], offered by [facility], by registering with the following website: [email or web address]

## 2019-07-05 NOTE — H&P CARDIOLOGY - PMH
NSTEMI (non-ST elevated myocardial infarction)    Pulmonary embolism NSTEMI (non-ST elevated myocardial infarction)    Pulmonary embolism    Systolic HF (heart failure)

## 2019-07-05 NOTE — PROGRESS NOTE ADULT - SUBJECTIVE AND OBJECTIVE BOX
PROGRESS ACCEPT NOTE    HPI: 78y F w/ no reported PMH transferred from [place] for cardiac cath for NSTEMI. Patient initially presented to [facility] on 7/3 c/o intermittent non-exertional, non-radiating chest pressure and tightness for 2 days a/w dyspnea on exertion and orthopnea. The chest pressure was worse on day of presentation. Patient took 4 baby aspirins and presented to the ED. ECG showed septal infarct and lateral ischemia. Troponins were elevated. Patient was started on asa, plavix, statin, bb, and ACE. CTA showed segmental PEs. B/l LE dopplers negative for DVT. Patient was started on hep gtt. TTE showed EF 25%, severe global LVSD, grade III diastolic dysfunction, moderately reduced RVSF, mild-mod TR, mild-mod AR, mild AS, and mod pHTN. Patient transferred to [facility]  for LHC. s/p LHC : p/mLAD 95%, mRCA 90%, Cx mild, EDP 92. Patient was recommended CABG, however, patient declined. Patient now undergoing medical optimization of acute systolic HF for staged PCI.    All: none  Prior home meds: vitamins  PMH: none prior  PSH: none  SH: Lives at home with her daughter who the patient takes care of, fully independent with ADLs and drives herself, nonsmoker, occasional glass of wine, no other drug use.  FHx: father with heart disease, mother with very mild hypothyroidism and lived a generally healthy life into her 90s    SUBJECTIVE/INTERVAL EVENTS: Patient seen and examined at bedside. States feels great now. Denies any symptoms including cp, sob, orthopnea, leg swelling.    MEDICATIONS  (STANDING):  aspirin enteric coated 81 milliGRAM(s) Oral daily  atorvastatin 40 milliGRAM(s) Oral at bedtime  furosemide   Injectable 40 milliGRAM(s) IV Push daily  heparin  Infusion.  Unit(s)/Hr (11 mL/Hr) IV Continuous <Continuous>  lisinopril 2.5 milliGRAM(s) Oral daily  metoprolol succinate ER 25 milliGRAM(s) Oral daily    MEDICATIONS  (PRN):  heparin  Injectable 5000 Unit(s) IV Push every 6 hours PRN For aPTT less than 40  heparin  Injectable 2500 Unit(s) IV Push every 6 hours PRN For aPTT between 40 - 57      VITAL SIGNS:  T(F): 97.1 (07-05-19 @ 17:00), Max: 98 (19 @ 20:37)  HR: 98 (19 @ 19:00) (94 - 114)  BP: 112/58 (19 @ 19:00) (101/48 - 147/84)  RR: 18 (19 @ 19:00) (16 - 18)  SpO2: 94% (19 @ 19:00) (91% - 97%)    I&O's Summary    2019 07:01  -  2019 19:37  --------------------------------------------------------  IN: 0 mL / OUT: 0 mL / NET: 0 mL      Daily Height in cm: 170.18 (2019 12:29)    Daily Weight in k.4 (2019 12:29)    PHYSICAL EXAM:  Gen: Alert, well-developed, NAD  HEENT: NCAT, PERRL, EOMI, conjunctiva clear, sclera anicteric, no erythema or exudates in the oropharynx, mmm  Neck: Supple, no JVD  CV: RRR, S1S2, no m/r/g  Resp: CTAB, normal respiratory effort  Abd: Soft, nontender, nondistended, normal bowel sounds  Ext: + peripheral pulses, no edema, no clubbing or cyanosis  Neuro: AOx3, no focal deficits  SKIN: No rashes    LABS:                        12.0   10.05 )-----------( 304      ( 2019 05:10 )             36.4     Hgb Trend: 12.0<--, 12.3<--, 13.5<--  07-05    135  |  99  |  13  ----------------------------<  97  4.5   |  27  |  0.85    Ca    9.0      2019 05:10  Mg     1.9     07-04      Creatinine Trend: 0.85<--, 0.79<--, 0.86<--    PT/INR - ( 2019 13:35 )   PT: 12.6 sec;   INR: 1.10 ratio         PTT - ( 2019 13:35 )  PTT:28.2 sec  CARDIAC MARKERS ( 2019 15:35 )  1.558 ng/mL / x     / x     / x     / x      CARDIAC MARKERS ( 2019 07:00 )  1.536 ng/mL / x     / x     / x     / x      CARDIAC MARKERS ( 2019 02:00 )  1.204 ng/mL / x     / x     / x     / x          Urinalysis Basic - ( 2019 21:15 )    Color: Yellow / Appearance: slightly cloudy / S.030 / pH: x  Gluc: x / Ketone: Moderate  / Bili: Negative / Urobili: Negative   Blood: x / Protein: 30 mg/dL / Nitrite: Negative   Leuk Esterase: Small / RBC: 0-4 /HPF / WBC 6-10 /HPF   Sq Epi: x / Non Sq Epi: Neg.-Few / Bacteria: Few /HPF        CAPILLARY BLOOD GLUCOSE          RADIOLOGY & ADDITIONAL TESTS: Reviewed    Imaging Personally Reviewed:    Consultant(s) Notes Reviewed:      Care Discussed with Consultants/Other Providers: PROGRESS ACCEPT NOTE    HPI: 78y F w/ no reported PMH transferred from [place] for cardiac cath for NSTEMI. Patient initially presented to [facility] on 7/3 c/o intermittent non-exertional, non-radiating chest pressure and tightness for 2 days a/w dyspnea on exertion and orthopnea. The chest pressure was worse on day of presentation. Patient took 4 baby aspirins and presented to the ED. ECG showed septal infarct and lateral ischemia. Troponins were elevated. Patient was started on asa, plavix, statin, bb, and ACE. CTA showed segmental PEs. B/l LE dopplers negative for DVT. Patient was started on hep gtt. TTE showed EF 25%, severe global LVSD, grade III diastolic dysfunction, moderately reduced RVSF, mild-mod TR, mild-mod AR, mild AS, and mod pHTN. Patient transferred to [facility]  for LHC. ECG again showed sinus tach 110bpm, ST elevation in V3 and ST depression in V5-6. Trops elevated. s/p LHC : p/mLAD 95%, mRCA 90%, Cx mild, EDP 92. Patient was recommended CABG, however, patient declined. Patient now undergoing medical optimization of acute systolic HF for staged PCI.    All: none  Prior home meds: vitamins  PMH: none prior  PSH: none  SH: Lives at home with her daughter who the patient takes care of, fully independent with ADLs and drives herself, nonsmoker, occasional glass of wine, no other drug use.  FHx: father with heart disease, mother with very mild hypothyroidism and lived a generally healthy life into her 90s    SUBJECTIVE/INTERVAL EVENTS: Patient seen and examined at bedside. States feels great now. Denies any symptoms including cp, sob, orthopnea, leg swelling. On tele: 7 beats wct this am, has been in sinus 100s, briefly 120s.      MEDICATIONS  (STANDING):  aspirin enteric coated 81 milliGRAM(s) Oral daily  atorvastatin 40 milliGRAM(s) Oral at bedtime  furosemide   Injectable 40 milliGRAM(s) IV Push daily  heparin  Infusion.  Unit(s)/Hr (11 mL/Hr) IV Continuous <Continuous>  lisinopril 2.5 milliGRAM(s) Oral daily  metoprolol succinate ER 25 milliGRAM(s) Oral daily    MEDICATIONS  (PRN):  heparin  Injectable 5000 Unit(s) IV Push every 6 hours PRN For aPTT less than 40  heparin  Injectable 2500 Unit(s) IV Push every 6 hours PRN For aPTT between 40 - 57      VITAL SIGNS:  T(F): 97.1 (19 @ 17:00), Max: 98 (19 @ 20:37)  HR: 98 (19 @ 19:00) (94 - 114)  BP: 112/58 (19 @ 19:00) (101/48 - 147/84)  RR: 18 (19 @ 19:00) (16 - 18)  SpO2: 94% (19 @ 19:00) (91% - 97%)    I&O's Summary    2019 07:01  -  2019 19:37  --------------------------------------------------------  IN: 0 mL / OUT: 0 mL / NET: 0 mL      Daily Height in cm: 170.18 (2019 12:29)    Daily Weight in k.4 (2019 12:29)    PHYSICAL EXAM:  Gen: Alert, well-developed, NAD  HEENT: NCAT, PERRL, EOMI, conjunctiva clear, sclera anicteric, no erythema or exudates in the oropharynx, mmm  Neck: Supple, no JVD  CV: RRR, S1S2, no m/r/g  Resp: CTAB, normal respiratory effort  Abd: Soft, nontender, nondistended, normal bowel sounds  Ext: + peripheral pulses, no edema, no clubbing or cyanosis  Neuro: AOx3, no focal deficits  SKIN: No rashes    LABS:                        12.0   10.05 )-----------( 304      ( 2019 05:10 )             36.4     Hgb Trend: 12.0<--, 12.3<--, 13.5<--  07-05    135  |  99  |  13  ----------------------------<  97  4.5   |  27  |  0.85    Ca    9.0      2019 05:10  Mg     1.9     07-04      Creatinine Trend: 0.85<--, 0.79<--, 0.86<--    PT/INR - ( 2019 13:35 )   PT: 12.6 sec;   INR: 1.10 ratio         PTT - ( 2019 13:35 )  PTT:28.2 sec  CARDIAC MARKERS ( 2019 15:35 )  1.558 ng/mL / x     / x     / x     / x      CARDIAC MARKERS ( 2019 07:00 )  1.536 ng/mL / x     / x     / x     / x      CARDIAC MARKERS ( 2019 02:00 )  1.204 ng/mL / x     / x     / x     / x          Urinalysis Basic - ( 2019 21:15 )    Color: Yellow / Appearance: slightly cloudy / S.030 / pH: x  Gluc: x / Ketone: Moderate  / Bili: Negative / Urobili: Negative   Blood: x / Protein: 30 mg/dL / Nitrite: Negative   Leuk Esterase: Small / RBC: 0-4 /HPF / WBC 6-10 /HPF   Sq Epi: x / Non Sq Epi: Neg.-Few / Bacteria: Few /HPF        CAPILLARY BLOOD GLUCOSE          RADIOLOGY & ADDITIONAL TESTS: Reviewed    Imaging Personally Reviewed:    Consultant(s) Notes Reviewed:      Care Discussed with Consultants/Other Providers: PROGRESS ACCEPT NOTE    HPI: 78y F w/ no reported PMH transferred from [place] for cardiac cath for NSTEMI. Patient initially presented to [facility] on 7/3 c/o intermittent non-exertional, non-radiating chest pressure and tightness for 2 days a/w dyspnea on exertion and orthopnea. The chest pressure was worse on day of presentation. Patient took 4 baby aspirins and presented to the ED. ECG showed septal infarct and lateral ischemia. Troponins were elevated. Patient was started on asa, plavix, statin, bb, and ACE. CTA showed segmental PEs. B/l LE dopplers negative for DVT. Patient was started on hep gtt. TTE showed EF 25%, severe global LVSD, grade III diastolic dysfunction, moderately reduced RVSF, mild-mod TR, mild-mod AR, mild AS, and mod pHTN. Patient transferred to [facility]  for LHC. ECG again showed sinus tach 110bpm, ST elevation in V3 and ST depression in V5-6. Trops elevated. s/p LHC : p/mLAD 95%, mRCA 90%, Cx mild, EDP 92. Patient was recommended CABG, however, patient declined. Patient now undergoing medical optimization of acute systolic HF for staged PCI.    All: none  Prior home meds: vitamins  PMH: none prior  PSH: none  SH: Lives at home with her daughter who the patient takes care of, fully independent with ADLs and drives herself, nonsmoker, occasional glass of wine, no other drug use.  FHx: father with heart disease, mother with very mild hypothyroidism and lived a generally healthy life into her 90s    SUBJECTIVE/INTERVAL EVENTS: Patient seen and examined at bedside. States feels great now. Denies any symptoms including cp, sob, orthopnea, leg swelling. On tele: 7 beats wct this am, has been in sinus 100s, briefly 120s.      MEDICATIONS  (STANDING):  aspirin enteric coated 81 milliGRAM(s) Oral daily  atorvastatin 40 milliGRAM(s) Oral at bedtime  furosemide   Injectable 40 milliGRAM(s) IV Push daily  heparin  Infusion.  Unit(s)/Hr (11 mL/Hr) IV Continuous <Continuous>  lisinopril 2.5 milliGRAM(s) Oral daily  metoprolol succinate ER 25 milliGRAM(s) Oral daily    MEDICATIONS  (PRN):  heparin  Injectable 5000 Unit(s) IV Push every 6 hours PRN For aPTT less than 40  heparin  Injectable 2500 Unit(s) IV Push every 6 hours PRN For aPTT between 40 - 57      VITAL SIGNS:  T(F): 97.1 (19 @ 17:00), Max: 98 (19 @ 20:37)  HR: 98 (19 @ 19:00) (94 - 114)  BP: 112/58 (19 @ 19:00) (101/48 - 147/84)  RR: 18 (19 @ 19:00) (16 - 18)  SpO2: 94% (19 @ 19:00) (91% - 97%)    I&O's Summary    2019 07:01  -  2019 19:37  --------------------------------------------------------  IN: 0 mL / OUT: 0 mL / NET: 0 mL      Daily Height in cm: 170.18 (2019 12:29)    Daily Weight in k.4 (2019 12:29)    PHYSICAL EXAM:  Gen: Alert, well-developed, NAD  HEENT: NCAT, PERRL, EOMI, conjunctiva clear, sclera anicteric, no erythema or exudates in the oropharynx, mmm  Neck: Supple, no JVD  CV: RRR, S1S2, no m/r/g  Resp: mild bibasilar crackles, normal respiratory effort on RA  Abd: Soft, nontender, nondistended, normal bowel sounds  Ext: + peripheral pulses, trace LE edema, no clubbing or cyanosis  Neuro: AOx3, no focal deficits  SKIN: No rashes    LABS:                        12.0   10.05 )-----------( 304      ( 2019 05:10 )             36.4     Hgb Trend: 12.0<--, 12.3<--, 13.5<--  07-05    135  |  99  |  13  ----------------------------<  97  4.5   |  27  |  0.85    Ca    9.0      2019 05:10  Mg     1.9     07-04      Creatinine Trend: 0.85<--, 0.79<--, 0.86<--    PT/INR - ( 2019 13:35 )   PT: 12.6 sec;   INR: 1.10 ratio         PTT - ( 2019 13:35 )  PTT:28.2 sec  CARDIAC MARKERS ( 2019 15:35 )  1.558 ng/mL / x     / x     / x     / x      CARDIAC MARKERS ( 2019 07:00 )  1.536 ng/mL / x     / x     / x     / x      CARDIAC MARKERS ( 2019 02:00 )  1.204 ng/mL / x     / x     / x     / x          Urinalysis Basic - ( 2019 21:15 )    Color: Yellow / Appearance: slightly cloudy / S.030 / pH: x  Gluc: x / Ketone: Moderate  / Bili: Negative / Urobili: Negative   Blood: x / Protein: 30 mg/dL / Nitrite: Negative   Leuk Esterase: Small / RBC: 0-4 /HPF / WBC 6-10 /HPF   Sq Epi: x / Non Sq Epi: Neg.-Few / Bacteria: Few /HPF        CAPILLARY BLOOD GLUCOSE          RADIOLOGY & ADDITIONAL TESTS: Reviewed    Imaging Personally Reviewed:    Consultant(s) Notes Reviewed:      Care Discussed with Consultants/Other Providers: PROGRESS ACCEPT NOTE    HPI: 78y F w/ no reported PMH transferred from [place] for cardiac cath for NSTEMI. Patient initially presented to [facility] on 7/3 c/o intermittent non-exertional, non-radiating chest pressure and tightness for 2 days a/w dyspnea on exertion and orthopnea. The chest pressure was worse on day of presentation. Patient took 4 baby aspirins and presented to the ED. ECG showed septal infarct and lateral ischemia. Troponins were elevated. Patient was started on asa, plavix, statin, bb, and ACE. CTA showed segmental PEs. B/l LE dopplers negative for DVT. Patient was started on hep gtt. TTE showed EF 25%, severe global LVSD, grade III diastolic dysfunction, moderately reduced RVSF, mild-mod TR, mild-mod AR, mild AS, and mod pHTN. Patient transferred to [facility]  for LHC. ECG again showed sinus tach 110bpm, ST elevation in V3 and ST depression in V5-6. Trops elevated. s/p LHC : p/mLAD 95%, mRCA 90%, Cx mild, EDP 92. Patient was recommended CABG, however, patient declined. Patient now undergoing medical optimization of acute systolic HF for staged PCI.    All: none  Prior home meds: vitamins  PCP: Dr. Kal Del Real  PMH: none prior  PSH: none  SH: Lives at home with her daughter who the patient takes care of, fully independent with ADLs and drives herself, nonsmoker, occasional glass of wine, no other drug use.  FHx: father with heart disease, mother with very mild hypothyroidism and lived a generally healthy life into her 90s    SUBJECTIVE/INTERVAL EVENTS: Patient seen and examined at bedside. States feels great now. Denies any symptoms including cp, sob, orthopnea, leg swelling. On tele: 7 beats wct this am, has been in sinus 100s, briefly 120s.      MEDICATIONS  (STANDING):  aspirin enteric coated 81 milliGRAM(s) Oral daily  atorvastatin 40 milliGRAM(s) Oral at bedtime  furosemide   Injectable 40 milliGRAM(s) IV Push daily  heparin  Infusion.  Unit(s)/Hr (11 mL/Hr) IV Continuous <Continuous>  lisinopril 2.5 milliGRAM(s) Oral daily  metoprolol succinate ER 25 milliGRAM(s) Oral daily    MEDICATIONS  (PRN):  heparin  Injectable 5000 Unit(s) IV Push every 6 hours PRN For aPTT less than 40  heparin  Injectable 2500 Unit(s) IV Push every 6 hours PRN For aPTT between 40 - 57      VITAL SIGNS:  T(F): 97.1 (19 @ 17:00), Max: 98 (19 @ 20:37)  HR: 98 (19 @ 19:00) (94 - 114)  BP: 112/58 (19 @ 19:00) (101/48 - 147/84)  RR: 18 (19 @ 19:00) (16 - 18)  SpO2: 94% (19 @ 19:00) (91% - 97%)    I&O's Summary    2019 07:01  -  2019 19:37  --------------------------------------------------------  IN: 0 mL / OUT: 0 mL / NET: 0 mL      Daily Height in cm: 170.18 (2019 12:29)    Daily Weight in k.4 (2019 12:29)    PHYSICAL EXAM:  Gen: Alert, well-developed, NAD  HEENT: NCAT, PERRL, EOMI, conjunctiva clear, sclera anicteric, no erythema or exudates in the oropharynx, mmm  Neck: Supple, no JVD  CV: RRR, S1S2, no m/r/g  Resp: mild bibasilar crackles, normal respiratory effort on RA  Abd: Soft, nontender, nondistended, normal bowel sounds  Ext: + peripheral pulses, trace LE edema, no clubbing or cyanosis  Neuro: AOx3, no focal deficits  SKIN: No rashes    LABS:                        12.0   10.05 )-----------( 304      ( 2019 05:10 )             36.4     Hgb Trend: 12.0<--, 12.3<--, 13.5<--  07-05    135  |  99  |  13  ----------------------------<  97  4.5   |  27  |  0.85    Ca    9.0      2019 05:10  Mg     1.9     07-04      Creatinine Trend: 0.85<--, 0.79<--, 0.86<--    PT/INR - ( 2019 13:35 )   PT: 12.6 sec;   INR: 1.10 ratio         PTT - ( 2019 13:35 )  PTT:28.2 sec  CARDIAC MARKERS ( 2019 15:35 )  1.558 ng/mL / x     / x     / x     / x      CARDIAC MARKERS ( 2019 07:00 )  1.536 ng/mL / x     / x     / x     / x      CARDIAC MARKERS ( 2019 02:00 )  1.204 ng/mL / x     / x     / x     / x          Urinalysis Basic - ( 2019 21:15 )    Color: Yellow / Appearance: slightly cloudy / S.030 / pH: x  Gluc: x / Ketone: Moderate  / Bili: Negative / Urobili: Negative   Blood: x / Protein: 30 mg/dL / Nitrite: Negative   Leuk Esterase: Small / RBC: 0-4 /HPF / WBC 6-10 /HPF   Sq Epi: x / Non Sq Epi: Neg.-Few / Bacteria: Few /HPF        CAPILLARY BLOOD GLUCOSE          RADIOLOGY & ADDITIONAL TESTS: Reviewed    Imaging Personally Reviewed:    Consultant(s) Notes Reviewed:      Care Discussed with Consultants/Other Providers: PROGRESS ACCEPT NOTE    HPI: 78y F w/ no reported PMH transferred from [place] for cardiac cath for NSTEMI. Patient initially presented to [facility] on 7/3 c/o intermittent non-exertional, non-radiating chest pressure and tightness for 2 days a/w dyspnea on exertion and orthopnea. The chest pressure was worse on day of presentation. Noted upper abdominal band like pain that resolved. Patient took 4 baby aspirins and presented to the ED. ECG showed septal infarct and lateral ischemia. Troponins were elevated. Patient was started on asa, plavix, statin, bb, and ACE. CTA showed segmental PEs. B/l LE dopplers negative for DVT. Patient was started on hep gtt. TTE showed EF 25%, severe global LVSD, grade III diastolic dysfunction, moderately reduced RVSF, mild-mod TR, mild-mod AR, mild AS, and mod pHTN. Patient transferred to [facility]  for LHC. ECG again showed sinus tach 110bpm, ST elevation in V3 and ST depression in V5-6. Trops elevated. s/p LHC : p/mLAD 95%, mRCA 90%, Cx mild, EDP 92. Patient was recommended CABG, however, patient declined. Patient now undergoing medical optimization of acute systolic HF for staged PCI.    All: none  Prior home meds: vitamins  PCP: Dr. Kal Del Real  PMH: none prior  PSH: none  SH: Lives at home with her daughter who the patient takes care of, fully independent with ADLs and drives herself, nonsmoker, occasional glass of wine, no other drug use.  FHx: father with heart disease, mother with very mild hypothyroidism and lived a generally healthy life into her 90s    SUBJECTIVE/INTERVAL EVENTS: Patient seen and examined at bedside. States feels great now. Denies any symptoms including cp, sob, orthopnea, leg swelling. On tele: 7 beats wct this am, has been in sinus 100s, briefly 120s.      MEDICATIONS  (STANDING):  aspirin enteric coated 81 milliGRAM(s) Oral daily  atorvastatin 40 milliGRAM(s) Oral at bedtime  furosemide   Injectable 40 milliGRAM(s) IV Push daily  heparin  Infusion.  Unit(s)/Hr (11 mL/Hr) IV Continuous <Continuous>  lisinopril 2.5 milliGRAM(s) Oral daily  metoprolol succinate ER 25 milliGRAM(s) Oral daily    MEDICATIONS  (PRN):  heparin  Injectable 5000 Unit(s) IV Push every 6 hours PRN For aPTT less than 40  heparin  Injectable 2500 Unit(s) IV Push every 6 hours PRN For aPTT between 40 - 57      VITAL SIGNS:  T(F): 97.1 (19 @ 17:00), Max: 98 (19 @ 20:37)  HR: 98 (19 @ 19:00) (94 - 114)  BP: 112/58 (19 @ 19:00) (101/48 - 147/84)  RR: 18 (19 @ 19:00) (16 - 18)  SpO2: 94% (19 @ 19:00) (91% - 97%)    I&O's Summary    2019 07:01  -  2019 19:37  --------------------------------------------------------  IN: 0 mL / OUT: 0 mL / NET: 0 mL      Daily Height in cm: 170.18 (2019 12:29)    Daily Weight in k.4 (2019 12:29)    PHYSICAL EXAM:  Gen: Alert, well-developed, NAD  HEENT: NCAT, PERRL, EOMI, conjunctiva clear, sclera anicteric, no erythema or exudates in the oropharynx, mmm  Neck: Supple, no JVD  CV: RRR, S1S2, no m/r/g  Resp: mild bibasilar crackles, normal respiratory effort on RA  Abd: Soft, nontender, nondistended, normal bowel sounds  Ext: + peripheral pulses, trace LE edema, no clubbing or cyanosis  Neuro: AOx3, no focal deficits  SKIN: No rashes    LABS:                        12.0   10.05 )-----------( 304      ( 2019 05:10 )             36.4     Hgb Trend: 12.0<--, 12.3<--, 13.5<--  07-05    135  |  99  |  13  ----------------------------<  97  4.5   |  27  |  0.85    Ca    9.0      2019 05:10  Mg     1.9     07-04      Creatinine Trend: 0.85<--, 0.79<--, 0.86<--    PT/INR - ( 2019 13:35 )   PT: 12.6 sec;   INR: 1.10 ratio         PTT - ( 2019 13:35 )  PTT:28.2 sec  CARDIAC MARKERS ( 2019 15:35 )  1.558 ng/mL / x     / x     / x     / x      CARDIAC MARKERS ( 2019 07:00 )  1.536 ng/mL / x     / x     / x     / x      CARDIAC MARKERS ( 2019 02:00 )  1.204 ng/mL / x     / x     / x     / x          Urinalysis Basic - ( 2019 21:15 )    Color: Yellow / Appearance: slightly cloudy / S.030 / pH: x  Gluc: x / Ketone: Moderate  / Bili: Negative / Urobili: Negative   Blood: x / Protein: 30 mg/dL / Nitrite: Negative   Leuk Esterase: Small / RBC: 0-4 /HPF / WBC 6-10 /HPF   Sq Epi: x / Non Sq Epi: Neg.-Few / Bacteria: Few /HPF        CAPILLARY BLOOD GLUCOSE          RADIOLOGY & ADDITIONAL TESTS: Reviewed    Imaging Personally Reviewed:    Consultant(s) Notes Reviewed:      Care Discussed with Consultants/Other Providers: PROGRESS ACCEPT NOTE    HPI: 78y F w/ no reported PMH transferred from [place] for cardiac cath for NSTEMI. Patient initially presented to [facility] on 7/3 c/o intermittent non-exertional, non-radiating chest pressure and tightness for 2 days a/w dyspnea on exertion and orthopnea. The chest pressure was worse on day of presentation. Noted upper abdominal band like pain that resolved. Patient took 4 baby aspirins and presented to the ED. ECG showed septal infarct and lateral ischemia. Troponins were elevated. Patient was started on asa, plavix, statin, bb, and ACE. CTA showed segmental PEs. B/l LE dopplers negative for DVT. Patient was started on hep gtt. TTE showed EF 25%, severe global LVSD, grade III diastolic dysfunction, moderately reduced RVSF, mild-mod TR, mild-mod AR, mild AS, and mod pHTN. Patient transferred to [facility]  for LHC. ECG again showed sinus tach 110bpm, ST elevation in V3 and ST depression in V5-6. Trops elevated. s/p LHC : p/mLAD 95%, mRCA 90%, Cx mild, EDP 92. Patient was recommended CABG, however, patient declined. Patient now undergoing medical optimization of acute systolic HF for staged PCI.    All: none  Prior home meds: vitamins  PCP: Dr. Kal Del Real  PMH: none prior  PSH: none  SH: Lives at home with her daughter "with special needs" who the patient takes care of, fully independent with ADLs and drives herself, nonsmoker, occasional glass of wine, no other drug use.  FHx: father with heart disease, mother with mild hypothyroidism and lived a generally healthy life into her 90s    SUBJECTIVE/INTERVAL EVENTS: Patient seen and examined at bedside. States feels great now. Denies any symptoms including cp, sob, orthopnea, leg swelling. On tele: 7 beats wct this am, has been in sinus 100s, briefly sinus 120s.      MEDICATIONS  (STANDING):  aspirin enteric coated 81 milliGRAM(s) Oral daily  atorvastatin 40 milliGRAM(s) Oral at bedtime  furosemide   Injectable 40 milliGRAM(s) IV Push daily  heparin  Infusion.  Unit(s)/Hr (11 mL/Hr) IV Continuous <Continuous>  lisinopril 2.5 milliGRAM(s) Oral daily  metoprolol succinate ER 25 milliGRAM(s) Oral daily    MEDICATIONS  (PRN):  heparin  Injectable 5000 Unit(s) IV Push every 6 hours PRN For aPTT less than 40  heparin  Injectable 2500 Unit(s) IV Push every 6 hours PRN For aPTT between 40 - 57      VITAL SIGNS:  T(F): 97.1 (19 @ 17:00), Max: 98 (19 @ 20:37)  HR: 98 (19 @ 19:00) (94 - 114)  BP: 112/58 (19 @ 19:00) (101/48 - 147/84)  RR: 18 (19 @ 19:00) (16 - 18)  SpO2: 94% (19 @ 19:00) (91% - 97%)    I&O's Summary    2019 07:01  -  2019 19:37  --------------------------------------------------------  IN: 0 mL / OUT: 0 mL / NET: 0 mL      Daily Height in cm: 170.18 (2019 12:29)    Daily Weight in k.4 (2019 12:29)    PHYSICAL EXAM:  Gen: Alert, well-developed, NAD  HEENT: NCAT, PERRL, EOMI, conjunctiva clear, sclera anicteric, no erythema or exudates in the oropharynx, mmm  Neck: Supple, no JVD  CV: RRR, S1S2, no m/r/g  Resp: mild bibasilar crackles, normal respiratory effort on RA  Abd: Soft, nontender, nondistended, normal bowel sounds  Ext: + peripheral pulses, trace LE edema, no clubbing or cyanosis  Neuro: AOx3, normal speech, strength 5/5 throughout, sensation intact throughout  SKIN: No rashes    LABS:                        12.0   10.05 )-----------( 304      ( 2019 05:10 )             36.4     Hgb Trend: 12.0<--, 12.3<--, 13.5<--  07-05    135  |  99  |  13  ----------------------------<  97  4.5   |  27  |  0.85    Ca    9.0      2019 05:10  Mg     1.9     07      Creatinine Trend: 0.85<--, 0.79<--, 0.86<--    PT/INR - ( 2019 13:35 )   PT: 12.6 sec;   INR: 1.10 ratio         PTT - ( 2019 13:35 )  PTT:28.2 sec  CARDIAC MARKERS ( 2019 15:35 )  1.558 ng/mL / x     / x     / x     / x      CARDIAC MARKERS ( 2019 07:00 )  1.536 ng/mL / x     / x     / x     / x      CARDIAC MARKERS ( 2019 02:00 )  1.204 ng/mL / x     / x     / x     / x          Urinalysis Basic - ( 2019 21:15 )    Color: Yellow / Appearance: slightly cloudy / S.030 / pH: x  Gluc: x / Ketone: Moderate  / Bili: Negative / Urobili: Negative   Blood: x / Protein: 30 mg/dL / Nitrite: Negative   Leuk Esterase: Small / RBC: 0-4 /HPF / WBC 6-10 /HPF   Sq Epi: x / Non Sq Epi: Neg.-Few / Bacteria: Few /HPF        CAPILLARY BLOOD GLUCOSE          RADIOLOGY & ADDITIONAL TESTS: Reviewed    Imaging Personally Reviewed:    Consultant(s) Notes Reviewed:      Care Discussed with Consultants/Other Providers: PROGRESS ACCEPT NOTE    HPI: 78y F w/ no reported PMH was transferred from [place] for cardiac cath for NSTEMI.   This patient initially presented to [facility] on 7/3/2019 with c/o intermittent non-exertional, non-radiating chest pressure and tightness for 2 days a/w dyspnea on exertion and orthopnea. The chest pressure was worse on day of presentation. She was noted, per [facility] documentation, to have upper abdominal band like pain that resolved. Patient took 4 baby aspirins and presented to the ED. ECG showed septal infarct and lateral ischemia. Troponins were elevated. Patient was started on asa, plavix, statin, bb, and ACEI. CTPA was performed which showed segmental PEs and lymphadenopathy. B/l LE dopplers negative for DVT. Patient had blood cultures, which have yielded no growth to date, and urine culture which indicated more than 3 organisms.   Patient was started on hep gtt. TTE showed EF 25%, severe global LVSD, grade III diastolic dysfunction, moderately reduced RVSF, mild-mod TR, mild-mod AR, mild AS, and mod pHTN. Patient transferred to [facility]  for LHC. ECG again showed sinus tach 110bpm, ST elevation in V3 and ST depression in V5-6 and troponin levels remained elevated, thus patient had diagnostic cardiac cath. She was found to have stenotic lesions - [facility] : p/mLAD 95%, mRCA 90%, Cx mild, EDP 92. Patient was recommended CABG, however, patient declined. Patient now undergoing medical optimization of acute systolic HF for staged PCI.    All: none  Prior home meds: vitamins  PCP: Dr. Kal Del Real  PMH: none  PSH: none  SH: Lives at home with her daughter "with special needs" who the patient takes care of, fully independent with ADLs and drives herself, nonsmoker, occasional glass of wine, no other drug use.  FHx: father with heart disease, mother with mild hypothyroidism and lived a generally healthy life into her 90s    SUBJECTIVE/INTERVAL EVENTS: Patient seen and examined at bedside. She states she feels great now. Denies any symptoms including cp, sob, orthopnea, leg swelling.   REVIEW OF SYSTEMS at this time  CONSTITUTIONAL: No fever, no chills, no fatigue  EYES: No eye pain, no vision changes  ENMT:  No difficulty hearing, no throat pain  RESPIRATORY: can lie flat comfortably, no cough, no wheezing, no sputum production; no shortness of breath  CARDIOVASCULAR: No chest pain, no palpitations  GASTROINTESTINAL: No abdominal pain, no nausea, no vomiting, no diarrhea, no constipation, no melena, no hematochezia.  GENITOURINARY: No dysuria, no hematuria  NEUROLOGICAL: No headaches, no loss of strength, no numbness  SKIN: No itching, no rashes, no lesions   MUSCULOSKELETAL: No joint pain, no joint swelling  HEME/LYMPH: No easy bruising, bleeding    MEDICATIONS  (STANDING):  aspirin enteric coated 81 milliGRAM(s) Oral daily  atorvastatin 40 milliGRAM(s) Oral at bedtime  furosemide   Injectable 40 milliGRAM(s) IV Push daily  heparin  Infusion.  Unit(s)/Hr (11 mL/Hr) IV Continuous <Continuous>  lisinopril 2.5 milliGRAM(s) Oral daily  metoprolol succinate ER 25 milliGRAM(s) Oral daily    MEDICATIONS  (PRN):  heparin  Injectable 5000 Unit(s) IV Push every 6 hours PRN For aPTT less than 40  heparin  Injectable 2500 Unit(s) IV Push every 6 hours PRN For aPTT between 40 - 57      VITAL SIGNS:  T(F): 97.1 (19 @ 17:00), Max: 98 (19 @ 20:37)  HR: 98 (19 @ 19:00) (94 - 114)  BP: 112/58 (19 @ 19:00) (101/48 - 147/84)  RR: 18 (19 @ 19:00) (16 - 18)  SpO2: 94% (19 @ 19:00) (91% - 97%)      I&O's Summary    2019 07:01  -  2019 19:37  --------------------------------------------------------  IN: 0 mL / OUT: 0 mL / NET: 0 mL      Daily Height in cm: 170.18 (2019 12:29)    Daily Weight in k.4 (2019 12:29)    PHYSICAL EXAM:  Gen: Alert, well-developed, NAD  HEAD/EYES: NCAT, PERRL, EOMI, conjunctiva clear, sclera anicteric,   ENT: no erythema or exudates in the oropharynx, mmm  Neck: Supple, no JVD  CV: RRR, S1S2, no m/r/g  Resp: mild bibasilar crackles, normal respiratory effort on RA  Abd: Soft, nontender, nondistended, normal bowel sounds  Ext: + peripheral pulses, trace LE edema,   Neuro: AOx3, normal speech, strength 5/5 throughout, sensation intact throughout, able to stand upright unassisted  SKIN: large ecchymosis at inferior aspect of right upper arm, which patient states resulted after lovenox injection provided at [place], no tenderness to palpation  MSK: No joint swelling or tenderness        LABS:  Labs, imaging and EKG and cardiology note reviewed personally by me.  On tele: 7 beats wct this am, has been in sinus 100s, briefly sinus 120s.                        12.0   10.05 )-----------( 304      ( 2019 05:10 )             36.4     Hgb Trend: 12.0<--, 12.3<--, 13.5<--  07-05    135  |  99  |  13  ----------------------------<  97  4.5   |  27  |  0.85    Ca    9.0      2019 05:10  Mg     1.9     07-04      Creatinine Trend: 0.85<--, 0.79<--, 0.86<--    PT/INR - ( 2019 13:35 )   PT: 12.6 sec;   INR: 1.10 ratio         PTT - ( 2019 13:35 )  PTT:28.2 sec  CARDIAC MARKERS ( 2019 15:35 )  1.558 ng/mL / x     / x     / x     / x      CARDIAC MARKERS ( 2019 07:00 )  1.536 ng/mL / x     / x     / x     / x      CARDIAC MARKERS ( 2019 02:00 )  1.204 ng/mL / x     / x     / x     / x          Urinalysis Basic - ( 2019 21:15 )    Color: Yellow / Appearance: slightly cloudy / S.030 / pH: x  Gluc: x / Ketone: Moderate  / Bili: Negative / Urobili: Negative   Blood: x / Protein: 30 mg/dL / Nitrite: Negative   Leuk Esterase: Small / RBC: 0-4 /HPF / WBC 6-10 /HPF   Sq Epi: x / Non Sq Epi: Neg.-Few / Bacteria: Few /HPF    CAPILLARY BLOOD GLUCOSE    < from: CT Angio Chest w/ IV Cont (19 @ 22:20) >  IMPRESSION:    Emboli in bilateral upper lobe, right middle lobe and right lower lobe segmental pulmonary arteries. Nonspecificinterlobular septal thickening and groundglass/patchy opacities in bilateral upper and lower lobes, which can be seen in setting of pulmonary edema given cardiomegaly and pleural effusion although pneumonia cannot be excluded. Please note that pulmonary infarct cannot be excluded in this setting.      Nonspecific mediastinal and right hilar lymphadenopathy.    Additional findings as described.    Discussed with Dr. Christianson.    < end of copied text >    < from: TTE Echo Complete w/Doppler (19 @ 09:16) >  Summary:   1. Left ventricular ejection fraction, by visual estimation, is 25%.   2. Severely decreased global left ventricular systolic function.   3. Spectral Doppler shows restrictive pattern of left ventricular myocardial filling (Grade III diastolic dysfunction).   4. Moderately reduced RV systolic function.   5. Large pleural effusion in both left and right lateral regions.   6. Mild thickening and calcification of the anterior and posterior mitral valve leaflets.   7. Mild-moderate tricuspid regurgitation.   8. Mild to moderate aortic regurgitation.   9. Estimated pulmonary artery systolic pressure is 66.7 mmHg assuming a right atrial pressure of 10 mmHg, which is consistent withmoderate pulmonary hypertension.  10. Pulmonary hypertension is present.  11. LA volume Index is 47.1 ml/m² ml/m2.  12. Peak transaortic gradient equals 10.7 mmHg, mean transaortic gradient equals 5.7 mmHg, the calculated aortic valve area equals1.72 cm² by the continuity equation consistent with mild aortic stenosis.    < end of copied text >

## 2019-07-05 NOTE — CHART NOTE - NSCHARTNOTEFT_GEN_A_CORE
s/p [facility]     verbal report  p/mLAD 95%  mRCA 90%  CX mild   EDP 32    plan patient not interested in CVS at this time  diurese as ordered  optimize medical therapy for heart failure  rpt TTE  For future PCI when pt is optimized    resume heparin gtt 6 hours post radial band removal (pt w PE)  monitor site closely for bleeding/hematoma

## 2019-07-05 NOTE — PROGRESS NOTE ADULT - PROBLEM SELECTOR PLAN 1
Presented with chest pressure/tightness/dyspnea on exertion x2 days, elevated trops, ECG with septal infarct and lateral ischemia. s/p LHC 7/3 showing obstruction to p/mLAD 95%, mRCA 90%, Cx mild. Patient recommended but declined CABG. c/w medical optimization for staged PCI.  - c/w asa, statin per cards Presented with chest pressure/tightness/dyspnea on exertion x2 days, elevated trops, ECG with septal infarct and lateral ischemia. s/p LHC 7/3 showing obstruction to p/mLAD 95%, mRCA 90%, Cx mild. Patient recommended but declined CABG. c/w medical optimization for staged PCI.  - c/w asa, statin per cards  - f/u cards recs Presented with chest pressure/tightness/dyspnea on exertion x2 days, elevated trops, ECG with septal infarct and lateral ischemia. s/p LHC 7/3 showing obstruction to p/mLAD 95%, mRCA 90%, Cx mild. Patient recommended but declined CABG. c/w medical optimization for staged PCI.  - recheck trop  - c/w asa, statin per cards  - f/u cards recs Presented with chest pressure/tightness/dyspnea on exertion x2 days, elevated trops, ECG with septal infarct and lateral ischemia. s/p LHC 7/3 showing obstruction to p/mLAD 95%, mRCA 90%, Cx mild. Patient recommended but declined CABG. c/w medical optimization for staged PCI.  - asymptomatic  - recheck trop  - c/w asa, statin, metoprolol, ACE per cards  - f/u cards recs Presented with chest pressure/tightness/dyspnea on exertion x2 days, elevated trops, ECG with septal infarct and lateral ischemia. s/p LHC 7/3 showing obstruction to p/mLAD 95%, mRCA 90%, Cx mild. Patient recommended but declined CABG. c/w medical optimization for staged PCI.  - patient is currently asymptomatic  - monitor on telemetry  - recheck trop until downtrending  - c/w asa, statin, metoprolol, ACEI per cardiology with goal of uptitrating metoprolol and ACEI as tolerated  - f/u cardiology rec to determine plan for staged PCI

## 2019-07-05 NOTE — PROGRESS NOTE ADULT - PROBLEM SELECTOR PLAN 4
DVT ppx: on hep gtt for PE  Diet: DASH Patient was noted on CT at [facility] to have nonspecific mediastinal and right hilar lymphadenopathy. Lymphadenopathy may possibly be related to malignancy.  Given her pulmonary emboli, patient will require evaluation for malignancy during this hospitalization.  Patient should have CT A/P w/ contrast- will not obtain today given patient already had contrast load today.

## 2019-07-05 NOTE — H&P CARDIOLOGY - HISTORY OF PRESENT ILLNESS
77 y/o F with no reported PMH presents to [facility] ED on 7/3 with c/o intermittent episodes of nonexertional, non radiating chest pressure/tightness x 2 days, dyspnea with exertion, orthopnea.  Chest pressure got worse day of admission  so she took 4 baby ASA.  Denies cough, fever, chills, palpitations, n/v/d, calf pain, ankle swelling, prolonged immobilization or all other complaints  CT Angio chest revealed segmental PE, Bilat pleural effusions, CHF.  Pt also has elevated Troponins 77 y/o F with no reported PMH presents to [place] ED on 7/3 with c/o intermittent episodes of nonexertional, non radiating chest pressure/tightness x 2 days, dyspnea with exertion, orthopnea.  Chest pressure got worse day of admission  so she took 4 baby ASA.  Denies cough, fever, chills, palpitations, n/v/d, calf pain, ankle swelling, prolonged immobilization or all other complaints.   EKG showed septal infarct +  lateral ischemia. Troponins elevated.  TTE done   CT Angio chest revealed segmental PE, Bilat pleural effusions, CHF. 79 y/o F with no reported PMH presents to [place] ED on 7/3 with c/o intermittent episodes of nonexertional, non radiating chest pressure/tightness x 2 days, dyspnea with exertion, orthopnea.  Chest pressure got worse day of admission  so she took 4 baby ASA.  Denies cough, fever, chills, palpitations, n/v/d, calf pain, ankle swelling, prolonged immobilization or all other complaints.   EKG showed septal infarct +  lateral ischemia. Troponins elevated.        TTE done 7/4    1. Left ventricular ejection fraction, by visual estimation, is 25%.   2. Severely decreased global left ventricular systolic function.   3. Spectral Doppler shows restrictive pattern of left ventricular   myocardial filling (Grade III diastolic dysfunction).   4. Moderately reduced RV systolic function.   5. Large pleural effusion in both left and right lateral regions.   6. Mild thickening and calcification of the anterior and posterior   mitral valve leaflets.   7. Mild-moderate tricuspid regurgitation.   8. Mild to moderate aortic regurgitation.   9. Estimated pulmonary artery systolic pressure is 66.7 mmHg assuming a   right atrial pressure of 10 mmHg, which is consistent with moderate   pulmonary hypertension.  10. Pulmonary hypertension is present.  11. LA volume Index is 47.1 ml/m² ml/m2.  12. Peak transaortic gradient equals 10.7 mmHg, mean transaortic gradient   equals 5.7 mmHg, the calculated aortic valve area equals 1.72 cm² by the   continuity equation consistent with mild aortic stenosis.     s/p  Chest Xray   Right lung infiltrate with underlying chronic lung disease and small   bilateral pleural effusions.      CT Angio   < from: CT Angio Chest w/ IV Cont (07.03.19 @ 22:20) >  Emboli in bilateral upper lobe, right middle lobe and right lower lobe   segmental pulmonary arteries. Nonspecificinterlobular septal thickening   and groundglass/patchy opacities in bilateral upper and lower lobes,   which can be seen in setting of pulmonary edema given cardiomegaly and   pleural effusion although pneumonia cannot be excluded. Please note that   pulmonary infarct cannot be excluded in this setting.    Nonspecific mediastinal and right hilar lymphadenopathy.    Lovenox initiated (last dose 23:00 hours 7/4),  Given IV zithromax 500mg x 1  + ceftriaxone 1000mg Iv x 1 77 y/o F with no reported PMH presents to [place] ED on 7/3 with c/o intermittent episodes of nonexertional, non radiating chest pressure/tightness x 2 days, dyspnea with exertion, orthopnea.  Chest pressure got worse day of admission  so she took 4 baby ASA.  Denies cough, fever, chills, palpitations, n/v/d, calf pain, ankle swelling, prolonged immobilization or all other complaints.   EKG showed septal infarct +  lateral ischemia. Troponins elevated.    TTE done 7/4    1. Left ventricular ejection fraction, by visual estimation, is 25%.   2. Severely decreased global left ventricular systolic function.   3. Spectral Doppler shows restrictive pattern of left ventricular   myocardial filling (Grade III diastolic dysfunction).   4. Moderately reduced RV systolic function.   5. Large pleural effusion in both left and right lateral regions.   6. Mild thickening and calcification of the anterior and posterior   mitral valve leaflets.   7. Mild-moderate tricuspid regurgitation.   8. Mild to moderate aortic regurgitation.   9. Estimated pulmonary artery systolic pressure is 66.7 mmHg assuming a   right atrial pressure of 10 mmHg, which is consistent with moderate   pulmonary hypertension.  10. Pulmonary hypertension is present.  11. LA volume Index is 47.1 ml/m² ml/m2.  12. Peak transaortic gradient equals 10.7 mmHg, mean transaortic gradient   equals 5.7 mmHg, the calculated aortic valve area equals 1.72 cm² by the   continuity equation consistent with mild aortic stenosis.     s/p  Chest Xray   Right lung infiltrate with underlying chronic lung disease and small   bilateral pleural effusions.      CT Angio   < from: CT Angio Chest w/ IV Cont (07.03.19 @ 22:20) >  Emboli in bilateral upper lobe, right middle lobe and right lower lobe   segmental pulmonary arteries. Nonspecificinterlobular septal thickening   and groundglass/patchy opacities in bilateral upper and lower lobes,   which can be seen in setting of pulmonary edema given cardiomegaly and   pleural effusion although pneumonia cannot be excluded. Please note that   pulmonary infarct cannot be excluded in this setting.    Nonspecific mediastinal and right hilar lymphadenopathy.    US duplex negative for DVT    Lovenox initiated (last dose 23:00 hours 7/4),  Given IV zithromax 500mg x 1  + ceftriaxone 1000mg Iv x 1     Transferred for cardiac cath. pain free, no acute SOB

## 2019-07-05 NOTE — PROGRESS NOTE ADULT - ASSESSMENT
78y F w/ no prior PMH presenting with NSTEMI, systolic HF, PE, b/l pleff s/p LHC showing multivessel disease, declined CABG, is undergoing medical optimization of HF for staged PCI. 78y F w/ no prior PMH initially presenting with chest tightness/pressure and DOEx2 days found to have NSTEMI, systolic HF, PE, b/l pleff s/p LHC showing multivessel disease, declined CABG, is undergoing medical optimization of HF for staged PCI. 78y F w/ no prior PMH initially presenting with chest tightness/pressure and DOEx2 days found to have NSTEMI, new onset acute systolic HF, pulmonary emboli, incidentally found on CTPA to have lymphadenopathy, now s/p LHC showing multivessel disease, declined CABG, is undergoing medical optimization of HF for staged PCI.

## 2019-07-05 NOTE — DISCHARGE NOTE NURSING/CASE MANAGEMENT/SOCIAL WORK - NSDCPEPT PROEDHF_GEN_ALL_CORE
Call primary care provider for follow up after discharge/Monitor weight daily/Report signs and symptoms to primary care provider/Activities as tolerated/Low salt diet

## 2019-07-06 DIAGNOSIS — I50.21 ACUTE SYSTOLIC (CONGESTIVE) HEART FAILURE: ICD-10-CM

## 2019-07-06 DIAGNOSIS — R59.0 LOCALIZED ENLARGED LYMPH NODES: ICD-10-CM

## 2019-07-06 LAB
ANION GAP SERPL CALC-SCNC: 15 MMOL/L — SIGNIFICANT CHANGE UP (ref 5–17)
APTT BLD: 59.7 SEC — HIGH (ref 27.5–36.3)
APTT BLD: 64.7 SEC — HIGH (ref 27.5–36.3)
BUN SERPL-MCNC: 12 MG/DL — SIGNIFICANT CHANGE UP (ref 7–23)
CALCIUM SERPL-MCNC: 9.1 MG/DL — SIGNIFICANT CHANGE UP (ref 8.4–10.5)
CHLORIDE SERPL-SCNC: 96 MMOL/L — SIGNIFICANT CHANGE UP (ref 96–108)
CK MB CFR SERPL CALC: 2 NG/ML — SIGNIFICANT CHANGE UP (ref 0–3.8)
CK MB CFR SERPL CALC: 2.1 NG/ML — SIGNIFICANT CHANGE UP (ref 0–3.8)
CK MB CFR SERPL CALC: 2.4 NG/ML — SIGNIFICANT CHANGE UP (ref 0–3.8)
CK SERPL-CCNC: 39 U/L — SIGNIFICANT CHANGE UP (ref 25–170)
CK SERPL-CCNC: 43 U/L — SIGNIFICANT CHANGE UP (ref 25–170)
CK SERPL-CCNC: 57 U/L — SIGNIFICANT CHANGE UP (ref 25–170)
CO2 SERPL-SCNC: 26 MMOL/L — SIGNIFICANT CHANGE UP (ref 22–31)
CREAT SERPL-MCNC: 0.76 MG/DL — SIGNIFICANT CHANGE UP (ref 0.5–1.3)
GLUCOSE SERPL-MCNC: 119 MG/DL — HIGH (ref 70–99)
HCT VFR BLD CALC: 40.9 % — SIGNIFICANT CHANGE UP (ref 34.5–45)
HGB BLD-MCNC: 13.9 G/DL — SIGNIFICANT CHANGE UP (ref 11.5–15.5)
MCHC RBC-ENTMCNC: 33.1 PG — SIGNIFICANT CHANGE UP (ref 27–34)
MCHC RBC-ENTMCNC: 34 GM/DL — SIGNIFICANT CHANGE UP (ref 32–36)
MCV RBC AUTO: 97.3 FL — SIGNIFICANT CHANGE UP (ref 80–100)
PLATELET # BLD AUTO: 343 K/UL — SIGNIFICANT CHANGE UP (ref 150–400)
POTASSIUM SERPL-MCNC: 3 MMOL/L — LOW (ref 3.5–5.3)
POTASSIUM SERPL-SCNC: 3 MMOL/L — LOW (ref 3.5–5.3)
RBC # BLD: 4.2 M/UL — SIGNIFICANT CHANGE UP (ref 3.8–5.2)
RBC # FLD: 12.2 % — SIGNIFICANT CHANGE UP (ref 10.3–14.5)
SODIUM SERPL-SCNC: 137 MMOL/L — SIGNIFICANT CHANGE UP (ref 135–145)
TROPONIN T, HIGH SENSITIVITY RESULT: 126 NG/L — HIGH (ref 0–51)
TROPONIN T, HIGH SENSITIVITY RESULT: 137 NG/L — HIGH (ref 0–51)
TROPONIN T, HIGH SENSITIVITY RESULT: 141 NG/L — HIGH (ref 0–51)
TROPONIN T, HIGH SENSITIVITY RESULT: 144 NG/L — HIGH (ref 0–51)
WBC # BLD: 8 K/UL — SIGNIFICANT CHANGE UP (ref 3.8–10.5)
WBC # FLD AUTO: 8 K/UL — SIGNIFICANT CHANGE UP (ref 3.8–10.5)

## 2019-07-06 PROCEDURE — 99223 1ST HOSP IP/OBS HIGH 75: CPT

## 2019-07-06 PROCEDURE — 99233 SBSQ HOSP IP/OBS HIGH 50: CPT

## 2019-07-06 RX ORDER — POTASSIUM CHLORIDE 20 MEQ
40 PACKET (EA) ORAL EVERY 4 HOURS
Refills: 0 | Status: COMPLETED | OUTPATIENT
Start: 2019-07-06 | End: 2019-07-06

## 2019-07-06 RX ORDER — LISINOPRIL 2.5 MG/1
2.5 TABLET ORAL ONCE
Refills: 0 | Status: COMPLETED | OUTPATIENT
Start: 2019-07-06 | End: 2019-07-06

## 2019-07-06 RX ORDER — LISINOPRIL 2.5 MG/1
5 TABLET ORAL DAILY
Refills: 0 | Status: DISCONTINUED | OUTPATIENT
Start: 2019-07-07 | End: 2019-07-09

## 2019-07-06 RX ADMIN — HEPARIN SODIUM 1400 UNIT(S)/HR: 5000 INJECTION INTRAVENOUS; SUBCUTANEOUS at 07:46

## 2019-07-06 RX ADMIN — Medication 40 MILLIGRAM(S): at 05:08

## 2019-07-06 RX ADMIN — HEPARIN SODIUM 1400 UNIT(S)/HR: 5000 INJECTION INTRAVENOUS; SUBCUTANEOUS at 15:26

## 2019-07-06 RX ADMIN — Medication 81 MILLIGRAM(S): at 11:49

## 2019-07-06 RX ADMIN — Medication 25 MILLIGRAM(S): at 05:07

## 2019-07-06 RX ADMIN — Medication 40 MILLIEQUIVALENT(S): at 10:38

## 2019-07-06 RX ADMIN — LISINOPRIL 2.5 MILLIGRAM(S): 2.5 TABLET ORAL at 10:37

## 2019-07-06 RX ADMIN — HEPARIN SODIUM 1400 UNIT(S)/HR: 5000 INJECTION INTRAVENOUS; SUBCUTANEOUS at 00:35

## 2019-07-06 RX ADMIN — Medication 40 MILLIEQUIVALENT(S): at 15:28

## 2019-07-06 RX ADMIN — LISINOPRIL 2.5 MILLIGRAM(S): 2.5 TABLET ORAL at 05:07

## 2019-07-06 NOTE — PROGRESS NOTE ADULT - ASSESSMENT
78y F w/ no prior PMH initially presenting with chest tightness/pressure and DOEx2 days found to have NSTEMI, new onset acute systolic HF, pulmonary emboli, incidentally found on CTPA to have lymphadenopathy, now s/p LHC showing multivessel disease, declined CABG, is undergoing medical optimization of HF for staged PCI.

## 2019-07-06 NOTE — CHART NOTE - NSCHARTNOTEFT_GEN_A_CORE
MEDICINE NP NOTE  Spectra [phone]      Received call from RN, patient with Troponin 126 this AM.  Denies any chest pain or shortness of breath at this time.  Left heart cath with 3v disease, patient declined CABG; plan for staged PCI starting Monday.  Continue to trend Trop until downtrending.  Will continue to monitor.

## 2019-07-06 NOTE — PROGRESS NOTE ADULT - PROBLEM SELECTOR PLAN 4
Patient was noted on CT at [facility] to have nonspecific mediastinal and right hilar lymphadenopathy. Lymphadenopathy may possibly be related to malignancy.  Given her pulmonary emboli, patient will require evaluation for malignancy during this hospitalization.  Patient should have CT A/P w/ contrast- will plan to do tomorrow

## 2019-07-06 NOTE — PROGRESS NOTE ADULT - PROBLEM SELECTOR PLAN 3
CTA showed PE involving b/l upper lobes, R middle and lower lobe segmental pulmonary arteries. b/l LE dopplers negative for DVT.  - Patient denies history of blood clots, tobacco use, immobility, recent long-duration of travel, trauma, hormone use, or family history of blood clots. She reports she had normal pap smears in the past. She has never had a colonoscopy or mammogram. This patient will need age appropriate cancer screening.   - c/w hep gtt for now, hold off on transition to oral pending possible inpatient PCI

## 2019-07-06 NOTE — PROGRESS NOTE ADULT - SUBJECTIVE AND OBJECTIVE BOX
Hospitalist- Bassam Rogers MD  Pager: [phone]  If no response or off-hours, page [phone]  -------------------------------------  Patient is a 78y old  Female who presents with a chief complaint of transfer for cardiac cath for NSTEMI (05 Jul 2019 19:36)  Subjective: No acute clinical events or tele events overnight. Today, feels well, no cp, no sob/cough. no fevers/chills.     14 point ros otherwise negative.     VITAL SIGNS:  Vital Signs Last 24 Hrs  T(C): 36.8 (06 Jul 2019 10:36), Max: 36.8 (06 Jul 2019 10:36)  T(F): 98.2 (06 Jul 2019 10:36), Max: 98.2 (06 Jul 2019 10:36)  HR: 99 (06 Jul 2019 10:36) (95 - 114)  BP: 132/71 (06 Jul 2019 10:36) (101/48 - 154/89)  BP(mean): --  RR: 18 (06 Jul 2019 10:36) (18 - 18)  SpO2: 92% (06 Jul 2019 10:36) (91% - 97%)      PHYSICAL EXAM:     GENERAL: no acute distress  HEENT: PERRLA, EOMI, moist oropharynx   RESPIRATORY: CTAB, no w/c   CARDIOVASCULAR: RRR, no murmurs, gallops, rubs  ABDOMINAL: soft, non-tender, non-distended, positive bowel sounds   EXTREMITIES: no clubbing, cyanosis, +trace bilateral pitting edema  NEUROLOGICAL: alert and oriented x 3, non-focal  SKIN: no rashes or lesions   MUSCULOSKELETAL: no gross joint deformity                          13.9   8.0   )-----------( 343      ( 06 Jul 2019 06:41 )             40.9     07-06    137  |  96  |  12  ----------------------------<  119<H>  3.0<L>   |  26  |  0.76    Ca    9.1      06 Jul 2019 06:40        CAPILLARY BLOOD GLUCOSE          MEDICATIONS  (STANDING):  aspirin enteric coated 81 milliGRAM(s) Oral daily  atorvastatin 40 milliGRAM(s) Oral at bedtime  furosemide   Injectable 40 milliGRAM(s) IV Push daily  heparin  Infusion.  Unit(s)/Hr (11 mL/Hr) IV Continuous <Continuous>  metoprolol succinate ER 25 milliGRAM(s) Oral daily    MEDICATIONS  (PRN):  heparin  Injectable 5000 Unit(s) IV Push every 6 hours PRN For aPTT less than 40  heparin  Injectable 2500 Unit(s) IV Push every 6 hours PRN For aPTT between 40 - 57

## 2019-07-06 NOTE — PROGRESS NOTE ADULT - PROBLEM SELECTOR PLAN 2
TTE EF 25%, severe global LVSD, grade III diastolic dysfunction, moderately reduced RVSF, mild-mod TR, mild-mod AR, mild AS, and mod pHTN. CTA with b/l pleural effusions and likely pulm edema. s/p IV diuresis.  - breathing comfortably sating mid 90s on RA. Trace LE edema. She appears euvolemic at this time  - c/w lasix 40 IV daily per cards, monitor I/O  - c/w toprol 25 daily and lisinopri 5 daily per cards  - repeat TTE  - f/u cardiology recommendations.

## 2019-07-06 NOTE — PROVIDER CONTACT NOTE (CRITICAL VALUE NOTIFICATION) - BACKGROUND
Patient transferred from [facility] for NSTEMI. Cath showing TVD- refusing CABG. Pt on Heparin gtt awaiting PCI on Monday

## 2019-07-06 NOTE — PROGRESS NOTE ADULT - PROBLEM SELECTOR PLAN 1
Presented with chest pressure/tightness/dyspnea on exertion x2 days, elevated trops, ECG with septal infarct and lateral ischemia. s/p LHC 7/3 showing obstruction to p/mLAD 95%, mRCA 90%, Cx mild. Patient recommended but declined CABG. c/w medical optimization for staged PCI.  - patient is currently asymptomatic  - monitor on telemetry  - recheck trop until downtrending  - c/w asa, statin, metoprolol, ACEI per cardiology with goal of uptitrating metoprolol and ACEI as tolerated  - f/u cardiology rec to determine plan for staged PCI- likely monday

## 2019-07-06 NOTE — CONSULT NOTE ADULT - SUBJECTIVE AND OBJECTIVE BOX
MRN-[id number]    CHIEF COMPLAINT:  Dyspnea    HISTORY OF PRESENT ILLNESS:  MAY CALVILLO is a 78y Female patient with no significant past medical history initially presenting to [facility] with chest pressure and shortness of breath. Workup demonstrated NSTEMI with elevated troponin I ( last value 1.558) and ECG with sinus tachycardia, LVH with strain pattern, TWI V5-6 with ST changes, anteroseptal infarct (age indeterminate), LBBB. Pro-BNP 8480. CTPA was performed which showed segmental PEs and lymphadenopathy. B/l LE dopplers negative for DVT.  TTE 2019 showed EF 25%, severe global LV systolic dysfunction, grade III diastolic dysfunction, moderately reduced RV systolic function, mild-mod TR, mild-mod AR, mild AS, and mod pHTN. She was diuresed with significant symptomatic improvement now reporting now shortness of breath or chest pain. Started on aspirin 81 mg daily, clopidogrel 75 mg daily, lisinopril 2.5 mg daily, metoprolol succinate 25 mg daily, and heparin ggt.  She was transferred to [facility] for LHC 2019 which revealed p/mLAD 95%, mRCA 90%, Cx mild, EDP 92 mmHg. She was recommended referral for CABG but she declined and is undergoing medical optimization for staged PCI beginning on Monday. Cardiology consulted for further management.     Allergies  No Known Allergies    PAST MEDICAL & SURGICAL HISTORY:  Systolic HF (heart failure)  Pulmonary embolism  NSTEMI (non-ST elevated myocardial infarction)  No significant past surgical history    FAMILY HISTORY:  Family history of heart disease (Father)    SOCIAL HISTORY:    Non-smoker    REVIEW OF SYSTEMS:  CONSTITUTIONAL: No fever, weight loss, Positive fatigue  EYES: No eye pain, visual disturbances.  ENMT:  No difficulty hearing, tinnitus.  NECK: No pain or stiffness  RESPIRATORY: No cough, wheezing; Positive Shortness of Breath (nearly resolved)  CARDIOVASCULAR: No palpitations, passing out, dizziness. Positive leg swelling and chest pain (resolved).  GASTROINTESTINAL: No abdominal or epigastric pain. No nausea, vomiting.  NEUROLOGICAL: No headaches, memory loss.  SKIN: No itching, burning, rashes  MUSCULOSKELETAL: No joint pain or swelling  PSYCHIATRIC: No depression, anxiety, mood swings  HEME/LYMPH: No easy bruising.    I&O's Summary    2019 07:01  -  2019 05:56  --------------------------------------------------------  IN: 140 mL / OUT: 200 mL / NET: -60 mL    PHYSICAL EXAM:  Vital Signs Last 24 Hrs  T(C): 36.7 (2019 04:46), Max: 36.7 (2019 04:46)  T(F): 98 (2019 04:46), Max: 98 (2019 04:46)  HR: 95 (2019 04:46) (95 - 114)  BP: 154/89 (2019 04:46) (101/48 - 154/89)  BP(mean): 113 (2019 12:29) (113 - 113)  RR: 18 (2019 04:46) (16 - 18)  SpO2: 94% (2019 04:46) (91% - 97%)    Appearance: Normal	  HEENT:   Normal oral mucosa  Lymphatic: No lymphadenopathy  Cardiovascular: Normal S1 S2, Trace edema  Respiratory: minimal bibasilar crackles.   Psychiatry: A & O x 3  Gastrointestinal:  Soft, Non-tender  Skin: No rashes, No ecchymoses, No cyanosis	  Neurologic: Non-focal  Extremities: No clubbing, cyanosis.  Vascular: Peripheral pulses palpable 2+ bilaterally    MEDICATIONS:  MEDICATIONS  (STANDING):  aspirin enteric coated 81 milliGRAM(s) Oral daily  atorvastatin 40 milliGRAM(s) Oral at bedtime  furosemide   Injectable 40 milliGRAM(s) IV Push daily  heparin  Infusion.  Unit(s)/Hr (11 mL/Hr) IV Continuous <Continuous>  lisinopril 2.5 milliGRAM(s) Oral daily  metoprolol succinate ER 25 milliGRAM(s) Oral daily    MEDICATIONS  (PRN):  heparin  Injectable 5000 Unit(s) IV Push every 6 hours PRN For aPTT less than 40  heparin  Injectable 2500 Unit(s) IV Push every 6 hours PRN For aPTT between 40 - 57    LABS:	 	  CBC Full  -  ( 2019 05:10 )  WBC Count : 10.05 K/uL  Hemoglobin : 12.0 g/dL  Hematocrit : 36.4 %  Platelet Count - Automated : 304 K/uL  Mean Cell Volume : 98.1 fl  Mean Cell Hemoglobin : 32.3 pg  Mean Cell Hemoglobin Concentration : 33.0 gm/dL    07    135  |  99  |  13  ----------------------------<  97  4.5   |  27  |  0.85    07-04    135  |  98  |  12  ----------------------------<  134<H>  3.6   |  25  |  0.79    Ca    9.0      2019 05:10  Ca    8.8      2019 07:00  Mg     1.9     07-04    PT/INR - ( 2019 13:35 )   PT: 12.6 sec;   INR: 1.10 ratio       PTT - ( 2019 13:35 )  PTT:28.2 sec  CARDIAC MARKERS ( 2019 15:35 )  1.558 ng/mL / x     / x     / x     / x      CARDIAC MARKERS ( 2019 07:00 )  1.536 ng/mL / x     / x     / x     / x        proBNP: 8480    Lipid Profile:     Total 204     TSH: 2.6    TELEMETRY: 2019  Sinus Tachycardia    EC2019  Sinus tachycardia, LVH with strain pattern, TWI V5-6 with ST changes, anteroseptal infarct (age indeterminate), LBBB.     CTA Chest 7/3/2019  IMPRESSION  Emboli in bilateral upper lobe, right middle lobe and right lower lobe   segmental pulmonary arteries. Nonspecific interlobular septal thickening   and groundglass/patchy opacities in bilateral upper and lower lobes,   which can be seen in setting of pulmonary edema given cardiomegaly and   pleural effusion although pneumonia cannot be excluded. Please note that   pulmonary infarct cannot be excluded in this setting.      Nonspecific mediastinal and right hilar lymphadenopathy.    CARDIAC TESTING/STUDIES:    Echocardiogram: 2019   1. Left ventricular ejection fraction, by visual estimation, is 25%.   2. Severely decreased global left ventricular systolic function.   3. Spectral Doppler shows restrictive pattern of left ventricular   myocardial filling (Grade III diastolic dysfunction).   4. Moderately reduced RV systolic function.   5. Large pleural effusion in both left and right lateral regions.   6. Mild thickening and calcification of the anterior and posterior   mitral valve leaflets.   7. Mild-moderate tricuspid regurgitation.   8. Mild to moderate aortic regurgitation.   9. Estimated pulmonary artery systolic pressure is 66.7 mmHg assuming a   right atrial pressure of 10 mmHg, which is consistent with moderate   pulmonary hypertension.  10. Pulmonary hypertension is present.  11. LA volume Index is 47.1 ml/m² ml/m2.  12. Peak transaortic gradient equals 10.7 mmHg, mean transaortic gradient   equals 5.7 mmHg, the calculated aortic valve area equals 1.72 cm² by the   continuity equation consistent with mild aortic stenosis.    Echocardiogram: 2019  Conclusions:  1. Mitral annular calcification and calcified mitral  leaflets with decreased diastolic opening. Mild-moderate  mitral regurgitation.  2. Calcified trileaflet aortic valve with decreased  opening. Mild aortic regurgitation.  3. Eccentric left ventricular hypertrophy (dilated left  ventricle with normal relative wall thickness).  4. Endocardial visualization enhanced with intravenous  injection of Ultrasonic Enhancing Agent (Definity). Severe  global left ventricular systolic dysfunction. No left  ventricular thrombus.  5. Increased E/e'  is consistent with elevated left  ventricular filling pressure.  6. Inferior vena cava is normal in size (>=1.5cm, <=2.5cm)  with normal respiratory variability consistent with right  atrial pressure 5-10mm Hg.  7. Normal right ventricular size and function.  *** No previous Echo exam.    Catheterization: 2019  p/mLAD 95%, mRCA 90%, Cx mild, EDP 92 mmHg.     ASSESSMENT/PLAN:   78y Female patient with no significant past medical history presenting with new diagnosis decompensated systolic congestive heart failure and NSTEMI.     1) Acute systolic CHF, new diagnosis   EF 15-20%   BNP 8480  Currently asymptomatic without dyspnea or angina after diuresis at OSH and started on GDMT for CHF.  Elevated filling pressures on ECHO and C.  C with 3v disease, declined CABG; plan for staged PCI starting Monday.   End organ perfusion maintained; normal lactate, LFTs, and renal function.     ·	Continue diuresis with furosemide 40 mg daily, although I believe she can tolerate BID dosing over the weekend  ·	Continue medical management with metoprolol succinate 25 mg daily; up-titrate in the near future as she clinically improves.   ·	Increase lisinopril to 5 mg daily as her blood pressures are elevated and filling pressures and remarkably elevated.   ·	Salt restriction, fluid restriction <1.5L/day.   ·	Replete electrolytes as needed.     2) CAD   NSTEMI likely multifactorial type 1 and 2.   [facility] 2019 p/mLAD 95%, mRCA 90%, Cx mild, EDP 92 mmHg  Declined CABG    ·	Continue medical management with aspirin 81 mg daily, metoprolol succinate 25 mg daily, lisinopril 5 mg daily, atorvastatin 40 mg nightly, heparin ggt.   ·	Resume clopidogrel 75 mg daily.   ·	Cardiac cath for staged PCI with Dr. Ross Monday.     3) PE  Hemodynamically stable and saturating well on RA     ·	Continue heparin ggt until staged PCI and will likely need coumadin in the setting of requiring triple therapy for a brief period of time, although consideration of a DOAC can be explored with aspirin and clopidogrel.     Snow Adamson MD, MPH, LESA  Cardiovascular Specialist Attending  [facility]  C: [phone]  E: [email or web address]  (Cardiology Nocturnist cell number available 7 pm - 7 am every night; available daytime week days for follow-up only; daytime weekends covered by general cardiology consult service)

## 2019-07-07 DIAGNOSIS — I25.10 ATHEROSCLEROTIC HEART DISEASE OF NATIVE CORONARY ARTERY WITHOUT ANGINA PECTORIS: ICD-10-CM

## 2019-07-07 LAB
ANION GAP SERPL CALC-SCNC: 12 MMOL/L — SIGNIFICANT CHANGE UP (ref 5–17)
APTT BLD: 100.6 SEC — HIGH (ref 27.5–36.3)
APTT BLD: 104.3 SEC — HIGH (ref 27.5–36.3)
BUN SERPL-MCNC: 17 MG/DL — SIGNIFICANT CHANGE UP (ref 7–23)
CALCIUM SERPL-MCNC: 9.3 MG/DL — SIGNIFICANT CHANGE UP (ref 8.4–10.5)
CHLORIDE SERPL-SCNC: 98 MMOL/L — SIGNIFICANT CHANGE UP (ref 96–108)
CO2 SERPL-SCNC: 26 MMOL/L — SIGNIFICANT CHANGE UP (ref 22–31)
CREAT SERPL-MCNC: 0.89 MG/DL — SIGNIFICANT CHANGE UP (ref 0.5–1.3)
GLUCOSE SERPL-MCNC: 112 MG/DL — HIGH (ref 70–99)
HCT VFR BLD CALC: 40.8 % — SIGNIFICANT CHANGE UP (ref 34.5–45)
HGB BLD-MCNC: 13.3 G/DL — SIGNIFICANT CHANGE UP (ref 11.5–15.5)
MCHC RBC-ENTMCNC: 31.9 PG — SIGNIFICANT CHANGE UP (ref 27–34)
MCHC RBC-ENTMCNC: 32.6 GM/DL — SIGNIFICANT CHANGE UP (ref 32–36)
MCV RBC AUTO: 97.8 FL — SIGNIFICANT CHANGE UP (ref 80–100)
PLATELET # BLD AUTO: 342 K/UL — SIGNIFICANT CHANGE UP (ref 150–400)
POTASSIUM SERPL-MCNC: 4.4 MMOL/L — SIGNIFICANT CHANGE UP (ref 3.5–5.3)
POTASSIUM SERPL-SCNC: 4.4 MMOL/L — SIGNIFICANT CHANGE UP (ref 3.5–5.3)
RBC # BLD: 4.17 M/UL — SIGNIFICANT CHANGE UP (ref 3.8–5.2)
RBC # FLD: 13 % — SIGNIFICANT CHANGE UP (ref 10.3–14.5)
SODIUM SERPL-SCNC: 136 MMOL/L — SIGNIFICANT CHANGE UP (ref 135–145)
TROPONIN T, HIGH SENSITIVITY RESULT: 126 NG/L — HIGH (ref 0–51)
WBC # BLD: 9.8 K/UL — SIGNIFICANT CHANGE UP (ref 3.8–10.5)
WBC # FLD AUTO: 9.8 K/UL — SIGNIFICANT CHANGE UP (ref 3.8–10.5)

## 2019-07-07 PROCEDURE — 99233 SBSQ HOSP IP/OBS HIGH 50: CPT

## 2019-07-07 PROCEDURE — ZZZZZ: CPT

## 2019-07-07 RX ADMIN — Medication 40 MILLIGRAM(S): at 05:34

## 2019-07-07 RX ADMIN — Medication 25 MILLIGRAM(S): at 05:34

## 2019-07-07 RX ADMIN — Medication 81 MILLIGRAM(S): at 12:03

## 2019-07-07 RX ADMIN — HEPARIN SODIUM 1300 UNIT(S)/HR: 5000 INJECTION INTRAVENOUS; SUBCUTANEOUS at 12:13

## 2019-07-07 RX ADMIN — LISINOPRIL 5 MILLIGRAM(S): 2.5 TABLET ORAL at 05:34

## 2019-07-07 RX ADMIN — HEPARIN SODIUM 1200 UNIT(S)/HR: 5000 INJECTION INTRAVENOUS; SUBCUTANEOUS at 18:17

## 2019-07-07 NOTE — PROGRESS NOTE ADULT - PROBLEM SELECTOR PLAN 1
Presented with chest pressure/tightness/dyspnea on exertion x2 days, elevated trops, ECG with septal infarct and lateral ischemia. s/p LHC 7/3 showing obstruction to p/mLAD 95%, mRCA 90%, Cx mild. Patient recommended but declined CABG. c/w medical optimization for staged PCI.  - patient is currently asymptomatic  - monitor on telemetry  - recheck trop until downtrending  - c/w asa, statin, metoprolol, ACEI per cardiology with goal of uptitrating metoprolol and ACEI as tolerated  - son would like to know more about CABG- consulted CT surgery, who will likely speak to patient and son tomorrow re: risks/benefits, etc.  - would also communicate with cardiology in AM re: staged PCI time in case patient opts for CABG.

## 2019-07-07 NOTE — PROGRESS NOTE ADULT - SUBJECTIVE AND OBJECTIVE BOX
History of Present Illness:  78y Female with complaints of intermittent chest tightness and shortness of breath over last several weeks, now sp cardiac cath with TVD  EF 20 percent, CHF and NSTEMI.   Patient offered PCI by cardiology with consult called to discuss options with CTS/Dr Khalil.  No significant history.    Past Medical History  Systolic HF (heart failure)  Pulmonary embolism  NSTEMI (non-ST elevated myocardial infarction)  No pertinent past medical history      Past Surgical History  No significant past surgical history      MEDICATIONS  (STANDING):  aspirin enteric coated 81 milliGRAM(s) Oral daily  atorvastatin 40 milliGRAM(s) Oral at bedtime  furosemide   Injectable 40 milliGRAM(s) IV Push daily  heparin  Infusion.  Unit(s)/Hr (11 mL/Hr) IV Continuous <Continuous>  lisinopril 5 milliGRAM(s) Oral daily  metoprolol succinate ER 25 milliGRAM(s) Oral daily    MEDICATIONS  (PRN):  heparin  Injectable 5000 Unit(s) IV Push every 6 hours PRN For aPTT less than 40  heparin  Injectable 2500 Unit(s) IV Push every 6 hours PRN For aPTT between 40 - 57    Allergies: No Known Allergies      SOCIAL HISTORY:  Smoker:  Yes     PACK YEARS:   social 1-2 cigarettes                      WHEN QUIT  20 yrs ago   ETOH use:  No           Ilicit Drug use:   No  Lives   with: daughter  Assist device use:  none      Relevant Family History  FAMILY HISTORY:  Family history of heart disease (Father)      Review of Systems  GENERAL:  Fevers[] chills[] sweats[] fatigue[] weight loss[] weight gain []                                      [x ] NEGATIVE  NEURO:  parathesias[] seizures []  syncope []  confusion []                                                                [ x] NEGATIVE                 EYES: glasses[]  blurry vision[]  discharge[] pain[] glaucoma []                                                           [x ] NEGATIVE                 ENMT:  difficulty hearing []  vertigo[]  dysphagia[] epistaxis[] recent dental work []                     [x ] NEGATIVE                 CV:    intermittent  chest pain   denies  palpitations                                                                                          RESPIRATORY:   denies  wheezing+ intermittent  SOB   denies cough                                                        GI:  nausea[]  vomiting []  diarrhea[] constipation [] melena []                                                          [x ] NEGATIVE            : hematuria[ ]  dysuria[ ] urgency[] incontinence[]                                                                          [ x] NEGATIVE                    MUSKULOSKELETAL:  arthritis[ ]  joint swelling [ ] muscle weakness [ ]                                            [ ] NEGATIVE                     SKIN/BREAST:  rash[ ] itching [ ]  hair loss[ ] masses[ ]                                                                          [ x] NEGATIVE                     PSYCH:  dementia [ ] depresion [ ] anxiety[ ]                                                                                          [xx ] NEGATIVE                        HEME/LYMPH:  bruises easily[ ] enlarged lymph nodes[ ] tender lymph nodes[ ]                           [x ] NEGATIVE                      ENDOCRINE:  cold intolerance[ ] heat intolerance[ ] polydipsia[ ]                                                      [x ] NEGATIVE                        PHYSICAL EXAM  Vital Signs Last 24 Hrs  T(C): 36.8 (07 Jul 2019 12:42), Max: 36.9 (06 Jul 2019 20:02)  T(F): 98.2 (07 Jul 2019 12:42), Max: 98.5 (06 Jul 2019 20:02)  HR: 97 (07 Jul 2019 12:42) (78 - 100)  BP: 126/87 (07 Jul 2019 12:42) (120/83 - 126/87)  BP(mean): --  RR: 18 (07 Jul 2019 12:42) (18 - 18)  SpO2: 94% (07 Jul 2019 12:42) (93% - 94%)    General: Well nourished, well developed, no acute distress.                                                         Neuro: Normal exam oriented to person/place & time with no focal motor or sensory  deficits.                    Eyes: Normal exam of conjunctiva & lids, pupils equally reactive.   ENT: Normal exam of nasal/oral mucosa with absence of cyanosis.   Neck: Normal exam of jugular veins, trachea & thyroid.   Chest: Normal lung exam with good air movement absence of wheezes, rales, or rhonchi:                                                                          CV:  Auscultation: normal [ x] S3[ ] S4[ ] Irregular [ ] Rub[ ] Clicks[ ]  Murmurs none:[x ]  Carotids: No Bruits[x ] Other____________ Abdominal Aorta: normal [ ] nonpalpable[ ]                                                                         GI: Normal exam of abdomen, liver & spleen with no noted masses or tenderness.                                                                                              Extremities: Normal no evidence of cyanosis or deformity Edema: none[ ]trace[ ]1+[ ]2+[ ]3+[ ]4+[ ]  Lower Extremity Pulses: Right[ ] Left[ ]Varicosities[ ]  SKIN : Normal exam to inspection & palation.                                                           LABS:                        13.3   9.80  )-----------( 342      ( 07 Jul 2019 09:04 )             40.8     07-07    136  |  98  |  17  ----------------------------<  112<H>  4.4   |  26  |  0.89    Ca    9.3      07 Jul 2019 06:52      PTT - ( 07 Jul 2019 10:11 )  PTT:104.3 sec    CARDIAC MARKERS ( 06 Jul 2019 22:52 )  x     / x     / 39 U/L / x     / 2.0 ng/mL  CARDIAC MARKERS ( 06 Jul 2019 14:42 )  x     / x     / 57 U/L / x     / 2.1 ng/mL  CARDIAC MARKERS ( 06 Jul 2019 11:51 )  x     / x     / 43 U/L / x     / 2.4 ng/mL          Cardiac Cath:   formal report to  follow, LAD 95 percent, RCA 90 Percent, mild cx disease

## 2019-07-07 NOTE — PROGRESS NOTE ADULT - ASSESSMENT
History of Present Illness:  78y Female with complaints of intermittent chest tightness and shortness of breath over last several weeks, now sp cardiac cath with TVD. CHF and NSTEMI.  Patient offered PCI by cardiology with consult called to discuss options with CTS/Dr Khalil.

## 2019-07-07 NOTE — PROGRESS NOTE ADULT - PROBLEM SELECTOR PLAN 3
CTA showed PE involving b/l upper lobes, R middle and lower lobe segmental pulmonary arteries. b/l LE dopplers negative for DVT.  - Patient denies history of blood clots, tobacco use, immobility, recent long-duration of travel, trauma, hormone use, or family history of blood clots. She reports she had normal pap smears in the past. She has never had a colonoscopy or mammogram. This patient will need age appropriate cancer screening.   - c/w hep gtt for now, hold off on transition to oral pending possible inpatient PCI  - Malignancy workup as below

## 2019-07-07 NOTE — PROGRESS NOTE ADULT - SUBJECTIVE AND OBJECTIVE BOX
Hospitalist- Bassam Rogers MD  Pager: [phone]  If no response or off-hours, page [phone]  -------------------------------------  Patient is a 78y old  Female who presents with a chief complaint of nstemi (06 Jul 2019 15:57)  Subjective: No acute event sovernight. Today feels well, no cp/palpitations, no sob/cough/fevers/chills. Would like to have a conversation with CT surgery and her son re: possibility of CABG, which she had initially declined.    14 point ros otherwise ngative.     VITAL SIGNS:  Vital Signs Last 24 Hrs  T(C): 36.8 (07 Jul 2019 12:42), Max: 36.9 (06 Jul 2019 20:02)  T(F): 98.2 (07 Jul 2019 12:42), Max: 98.5 (06 Jul 2019 20:02)  HR: 97 (07 Jul 2019 12:42) (78 - 100)  BP: 126/87 (07 Jul 2019 12:42) (120/83 - 126/87)  BP(mean): --  RR: 18 (07 Jul 2019 12:42) (18 - 18)  SpO2: 94% (07 Jul 2019 12:42) (93% - 94%)      PHYSICAL EXAM:     GENERAL: no acute distress  HEENT: PERRLA, EOMI, moist oropharynx   RESPIRATORY: CTAB, no w/c   CARDIOVASCULAR: RRR, no murmurs, gallops, rubs  ABDOMINAL: soft, non-tender, non-distended, positive bowel sounds   EXTREMITIES: no clubbing, cyanosis, +trace pitting edema  NEUROLOGICAL: alert and oriented x 3, non-focal  SKIN: no rashes or lesions   MUSCULOSKELETAL: no gross joint deformity                          13.3   9.80  )-----------( 342      ( 07 Jul 2019 09:04 )             40.8     07-07    136  |  98  |  17  ----------------------------<  112<H>  4.4   |  26  |  0.89    Ca    9.3      07 Jul 2019 06:52        CAPILLARY BLOOD GLUCOSE          MEDICATIONS  (STANDING):  aspirin enteric coated 81 milliGRAM(s) Oral daily  atorvastatin 40 milliGRAM(s) Oral at bedtime  furosemide   Injectable 40 milliGRAM(s) IV Push daily  heparin  Infusion.  Unit(s)/Hr (11 mL/Hr) IV Continuous <Continuous>  lisinopril 5 milliGRAM(s) Oral daily  metoprolol succinate ER 25 milliGRAM(s) Oral daily    MEDICATIONS  (PRN):  heparin  Injectable 5000 Unit(s) IV Push every 6 hours PRN For aPTT less than 40  heparin  Injectable 2500 Unit(s) IV Push every 6 hours PRN For aPTT between 40 - 57

## 2019-07-07 NOTE — PROGRESS NOTE ADULT - PROBLEM SELECTOR PLAN 4
Patient was noted on CT at [facility] to have nonspecific mediastinal and right hilar lymphadenopathy. Lymphadenopathy may possibly be related to malignancy.  Given her pulmonary emboli, patient will require evaluation for malignancy during this hospitalization.  Patient should have CT A/P w/ contrast prior to discharge- Given recent contrast load friday, and repeat contrast planned likely for tomorrow, will defer until after staged PCI if Cr remains stable.

## 2019-07-07 NOTE — PROGRESS NOTE ADULT - PROBLEM SELECTOR PLAN 2
Please call for In Patient follow up  Tobacco Use Disorder, Chest Pain   TTE EF 25%, severe global LVSD, grade III diastolic dysfunction, moderately reduced RVSF, mild-mod TR, mild-mod AR, mild AS, and mod pHTN. CTA with b/l pleural effusions and likely pulm edema. s/p IV diuresis.  - breathing comfortably sating mid 90s on RA. Trace LE edema. She appears euvolemic at this time  - c/w lasix 40 IV daily per cards, monitor I/O  - c/w toprol 25 daily and lisinopri 5 daily per cards  - repeat TTE  - f/u cardiology recommendations.

## 2019-07-07 NOTE — PROVIDER CONTACT NOTE (OTHER) - ASSESSMENT
Pt awake, having breakfast at time of the episode. Remains A&O x 4, VSS, denies CP/SOB/pain/discomfort. On Heparin gtt with full anticoagulation nomogram.

## 2019-07-08 LAB
ANION GAP SERPL CALC-SCNC: 13 MMOL/L — SIGNIFICANT CHANGE UP (ref 5–17)
APTT BLD: 110.3 SEC — HIGH (ref 27.5–36.3)
APTT BLD: 65.9 SEC — HIGH (ref 27.5–36.3)
APTT BLD: 81.4 SEC — HIGH (ref 27.5–36.3)
APTT BLD: 91.3 SEC — HIGH (ref 27.5–36.3)
BLD GP AB SCN SERPL QL: NEGATIVE — SIGNIFICANT CHANGE UP
BUN SERPL-MCNC: 17 MG/DL — SIGNIFICANT CHANGE UP (ref 7–23)
CALCIUM SERPL-MCNC: 9.7 MG/DL — SIGNIFICANT CHANGE UP (ref 8.4–10.5)
CHLORIDE SERPL-SCNC: 97 MMOL/L — SIGNIFICANT CHANGE UP (ref 96–108)
CO2 SERPL-SCNC: 26 MMOL/L — SIGNIFICANT CHANGE UP (ref 22–31)
CREAT SERPL-MCNC: 0.98 MG/DL — SIGNIFICANT CHANGE UP (ref 0.5–1.3)
GLUCOSE SERPL-MCNC: 102 MG/DL — HIGH (ref 70–99)
HBA1C BLD-MCNC: 5.8 % — HIGH (ref 4–5.6)
HCT VFR BLD CALC: 39.9 % — SIGNIFICANT CHANGE UP (ref 34.5–45)
HGB BLD-MCNC: 12.8 G/DL — SIGNIFICANT CHANGE UP (ref 11.5–15.5)
MCHC RBC-ENTMCNC: 31.4 PG — SIGNIFICANT CHANGE UP (ref 27–34)
MCHC RBC-ENTMCNC: 32.1 GM/DL — SIGNIFICANT CHANGE UP (ref 32–36)
MCV RBC AUTO: 98 FL — SIGNIFICANT CHANGE UP (ref 80–100)
PLATELET # BLD AUTO: 365 K/UL — SIGNIFICANT CHANGE UP (ref 150–400)
POTASSIUM SERPL-MCNC: 4.6 MMOL/L — SIGNIFICANT CHANGE UP (ref 3.5–5.3)
POTASSIUM SERPL-SCNC: 4.6 MMOL/L — SIGNIFICANT CHANGE UP (ref 3.5–5.3)
RBC # BLD: 4.07 M/UL — SIGNIFICANT CHANGE UP (ref 3.8–5.2)
RBC # FLD: 13.2 % — SIGNIFICANT CHANGE UP (ref 10.3–14.5)
RH IG SCN BLD-IMP: POSITIVE — SIGNIFICANT CHANGE UP
SODIUM SERPL-SCNC: 136 MMOL/L — SIGNIFICANT CHANGE UP (ref 135–145)
T3 SERPL-MCNC: 94 NG/DL — SIGNIFICANT CHANGE UP (ref 80–200)
T4 AB SER-ACNC: 7.6 UG/DL — SIGNIFICANT CHANGE UP (ref 4.6–12)
TROPONIN T, HIGH SENSITIVITY RESULT: 99 NG/L — HIGH (ref 0–51)
TSH SERPL-MCNC: 6.15 UIU/ML — HIGH (ref 0.27–4.2)
WBC # BLD: 9.35 K/UL — SIGNIFICANT CHANGE UP (ref 3.8–10.5)
WBC # FLD AUTO: 9.35 K/UL — SIGNIFICANT CHANGE UP (ref 3.8–10.5)

## 2019-07-08 PROCEDURE — 99233 SBSQ HOSP IP/OBS HIGH 50: CPT

## 2019-07-08 RX ADMIN — LISINOPRIL 5 MILLIGRAM(S): 2.5 TABLET ORAL at 05:39

## 2019-07-08 RX ADMIN — HEPARIN SODIUM 1100 UNIT(S)/HR: 5000 INJECTION INTRAVENOUS; SUBCUTANEOUS at 21:21

## 2019-07-08 RX ADMIN — HEPARIN SODIUM 1200 UNIT(S)/HR: 5000 INJECTION INTRAVENOUS; SUBCUTANEOUS at 00:54

## 2019-07-08 RX ADMIN — HEPARIN SODIUM 1100 UNIT(S)/HR: 5000 INJECTION INTRAVENOUS; SUBCUTANEOUS at 07:51

## 2019-07-08 RX ADMIN — Medication 40 MILLIGRAM(S): at 05:39

## 2019-07-08 RX ADMIN — HEPARIN SODIUM 1100 UNIT(S)/HR: 5000 INJECTION INTRAVENOUS; SUBCUTANEOUS at 14:57

## 2019-07-08 RX ADMIN — Medication 25 MILLIGRAM(S): at 05:39

## 2019-07-08 RX ADMIN — Medication 81 MILLIGRAM(S): at 07:54

## 2019-07-08 NOTE — PROGRESS NOTE ADULT - SUBJECTIVE AND OBJECTIVE BOX
SUBJ:    MEDICATIONS  (STANDING):  aspirin enteric coated 81 milliGRAM(s) Oral daily  atorvastatin 40 milliGRAM(s) Oral at bedtime  furosemide   Injectable 40 milliGRAM(s) IV Push daily  heparin  Infusion.  Unit(s)/Hr (11 mL/Hr) IV Continuous <Continuous>  lisinopril 5 milliGRAM(s) Oral daily  metoprolol succinate ER 25 milliGRAM(s) Oral daily    MEDICATIONS  (PRN):  heparin  Injectable 5000 Unit(s) IV Push every 6 hours PRN For aPTT less than 40  heparin  Injectable 2500 Unit(s) IV Push every 6 hours PRN For aPTT between 40 - 57      Vital Signs Last 24 Hrs  T(C): 36.4 (08 Jul 2019 04:48), Max: 36.8 (07 Jul 2019 12:42)  T(F): 97.6 (08 Jul 2019 04:48), Max: 98.2 (07 Jul 2019 12:42)  HR: 86 (08 Jul 2019 04:48) (86 - 98)  BP: 112/75 (08 Jul 2019 04:48) (112/75 - 126/87)  BP(mean): --  RR: 18 (08 Jul 2019 04:48) (18 - 18)  SpO2: 91% (08 Jul 2019 04:48) (91% - 96%)    REVIEW OF SYSTEMS:  CONSTITUTIONAL: No fever, weight loss, or fatigue  EYES: No eye pain, visual disturbances, or discharge  ENMT:  No difficulty hearing, tinnitus, vertigo; No sinus or throat pain  NECK: No pain or stiffness  RESPIRATORY: No cough, wheezing, chills or hemoptysis; No shortness of breath  CARDIOVASCULAR: No chest pain, palpitations, dizziness, or leg swelling  GASTROINTESTINAL: No abdominal or epigastric pain. No nausea, vomiting, or hematemesis; No diarrhea or constipation. No melena or hematochezia.  GENITOURINARY: No dysuria, frequency, hematuria, or incontinence  NEUROLOGICAL: No headaches, memory loss, loss of strength, numbness, or tremors  SKIN: No itching, burning, rashes, or lesions   LYMPH NODES: No enlarged glands  ENDOCRINE: No heat or cold intolerance; No hair loss  MUSCULOSKELETAL: No joint pain or swelling; No muscle, back, or extremity pain  PSYCHIATRIC: No depression, anxiety, mood swings, or difficulty sleeping  HEME/LYMPH: No easy bruising, or bleeding gums  ALLERY AND IMMUNOLOGIC: No hives or eczema    PHYSICAL EXAM:  · CONSTITUTIONAL: Well-developed, well nourished      · EYES: EOMI; PERRL; no drainage or redness  · NECK: No bruits; no thyromegaly or nodules  ·RESPIRATORY:   airway patent; breath sounds equal; good air movement; respirations non-labored; clear to auscultation bilaterally; no chest wall tenderness; no intercostal retractions; no rales,rhonchi or wheeze  · CARDIOVASCULAR: regular rate and rhythm  no rub  no murmur  normal PMI  . GASTROINTESTINAL:  no distention; no masses palpable; bowel sounds normal  · EXTREMITIES: No cyanosis, clubbing or edema  · VASCULAR:  Equal and normal pulses (radial, femoral)  ·NEUROLOGICAL:  Alert and oriented x 3; sensation intact; deep reflexes intact; cranial nerves intact; no spontaneous movement; superficial reflexes intact; normal strength  · SKIN: No lesions; no rash  . LYMPH NODES: No lymphadedenopathy  · MUSCULOSKELETAL:  No calf tenderness  no joint swelling	    TELEMETRY:    ECG:    TTE:    LABS:   CBC Full  -  ( 07 Jul 2019 09:04 )  WBC Count : 9.80 K/uL  RBC Count : 4.17 M/uL  Hemoglobin : 13.3 g/dL  Hematocrit : 40.8 %  Platelet Count - Automated : 342 K/uL  Mean Cell Volume : 97.8 fl  Mean Cell Hemoglobin : 31.9 pg  Mean Cell Hemoglobin Concentration : 32.6 gm/dL  Auto Neutrophil # : x  Auto Lymphocyte # : x  Auto Monocyte # : x  Auto Eosinophil # : x  Auto Basophil # : x  Auto Neutrophil % : x  Auto Lymphocyte % : x  Auto Monocyte % : x  Auto Eosinophil % : x  Auto Basophil % : x    07-07    136  |  98  |  17  ----------------------------<  112<H>  4.4   |  26  |  0.89    Ca    9.3      07 Jul 2019 06:52      CARDIAC MARKERS ( 06 Jul 2019 22:52 )  x     / x     / 39 U/L / x     / 2.0 ng/mL  CARDIAC MARKERS ( 06 Jul 2019 14:42 )  x     / x     / 57 U/L / x     / 2.1 ng/mL  CARDIAC MARKERS ( 06 Jul 2019 11:51 )  x     / x     / 43 U/L / x     / 2.4 ng/mL        RADIOLOGY & ADDITIONAL STUDIES:    IMPRESSION AND PLAN:        Snow Adamson MD, MPH, LESA  Cardiovascular Specialist Attending  [facility], [place]  C: [phone]  E: [email or web address]  (Cardiology nocturnist available every night 7 pm - 7 am; available week days for follow-up; general cardiology consult coverage weekend days) SUBJ:  No acute events over the weekend. Tolerating CHF medication and diuresis. Asymptomatic without shortness of breath or chest pain. There was some confusion regarding her wishes for PCI versus CABG. Her son had requested info regarding CABG and CTS was consulted, she does not want CABG and I have informed the interventional and primary teams of her wishes.     MEDICATIONS  (STANDING):  aspirin enteric coated 81 milliGRAM(s) Oral daily  atorvastatin 40 milliGRAM(s) Oral at bedtime  furosemide   Injectable 40 milliGRAM(s) IV Push daily  heparin  Infusion.  Unit(s)/Hr (11 mL/Hr) IV Continuous <Continuous>  lisinopril 5 milliGRAM(s) Oral daily  metoprolol succinate ER 25 milliGRAM(s) Oral daily    MEDICATIONS  (PRN):  heparin  Injectable 5000 Unit(s) IV Push every 6 hours PRN For aPTT less than 40  heparin  Injectable 2500 Unit(s) IV Push every 6 hours PRN For aPTT between 40 - 57    Vital Signs Last 24 Hrs  T(C): 36.4 (08 Jul 2019 04:48), Max: 36.8 (07 Jul 2019 12:42)  T(F): 97.6 (08 Jul 2019 04:48), Max: 98.2 (07 Jul 2019 12:42)  HR: 86 (08 Jul 2019 04:48) (86 - 98)  BP: 112/75 (08 Jul 2019 04:48) (112/75 - 126/87)  BP(mean): --  RR: 18 (08 Jul 2019 04:48) (18 - 18)  SpO2: 91% (08 Jul 2019 04:48) (91% - 96%)    REVIEW OF SYSTEMS:  As per HPI, otherwise unremarkable.     PHYSICAL EXAM:  Appearance: Normal	  HEENT:   Normal oral mucosa  Lymphatic: No lymphadenopathy  Cardiovascular: Normal S1 S2, No edema  Respiratory: CTAB  Psychiatry: A & O x 3  Gastrointestinal:  Soft, Non-tender  Skin: No rashes, No ecchymoses, No cyanosis	  Neurologic: Non-focal  Extremities: No clubbing, cyanosis.  Vascular: Peripheral pulses palpable 2+ bilaterally    TTE: 7/5/2019  Conclusions:  1. Mitral annular calcification and calcified mitral  leaflets with decreased diastolic opening. Mild-moderate  mitral regurgitation.  2. Calcified trileaflet aortic valve with decreased  opening. Mild aortic regurgitation.  3. Eccentric left ventricular hypertrophy (dilated left  ventricle with normal relative wall thickness).  4. Endocardial visualization enhanced with intravenous  injection of Ultrasonic Enhancing Agent (Definity). Severe  global left ventricular systolic dysfunction. No left  ventricular thrombus.  5. Increased E/e'  is consistent with elevated left  ventricular filling pressure.  6. Inferior vena cava is normal in size (>=1.5cm, <=2.5cm)  with normal respiratory variability consistent with right  atrial pressure 5-10mm Hg.  7. Normal right ventricular size and function.  *** No previous Echo exam.    LABS:   CBC Full  -  ( 07 Jul 2019 09:04 )  WBC Count : 9.80 K/uL  RBC Count : 4.17 M/uL  Hemoglobin : 13.3 g/dL  Hematocrit : 40.8 %  Platelet Count - Automated : 342 K/uL  Mean Cell Volume : 97.8 fl  Mean Cell Hemoglobin : 31.9 pg  Mean Cell Hemoglobin Concentration : 32.6 gm/dL    07-07    136  |  98  |  17  ----------------------------<  112<H>  4.4   |  26  |  0.89    Ca    9.3      07 Jul 2019 06:52    CARDIAC MARKERS ( 06 Jul 2019 22:52 )  x     / x     / 39 U/L / x     / 2.0 ng/mL  CARDIAC MARKERS ( 06 Jul 2019 14:42 )  x     / x     / 57 U/L / x     / 2.1 ng/mL  CARDIAC MARKERS ( 06 Jul 2019 11:51 )  x     / x     / 43 U/L / x     / 2.4 ng/mL    IMPRESSION AND PLAN:  78y Female patient with no significant past medical history presenting with new diagnosis decompensated systolic congestive heart failure and NSTEMI.     1) Acute systolic CHF, new diagnosis   EF 15-20%   BNP 8480  Currently asymptomatic without dyspnea or angina after diuresis at OSH and started on GDMT for CHF.  Elevated filling pressures on ECHO and LHC.  LHC with 3v disease, declined CABG; plan for staged PCI starting Monday.   End organ perfusion maintained; normal lactate, LFTs, and renal function.     ·	Continue diuresis with furosemide 40 mg daily, although I believe she can tolerate BID dosing over the weekend  ·	Increase metoprolol succinate 25 mg daily.   ·	Increase lisinopril to 5 mg daily as her blood pressures are elevated and filling pressures and remarkably elevated.   ·	Salt restriction, fluid restriction <1.5L/day.   ·	Replete electrolytes as needed.     2) CAD   NSTEMI likely multifactorial type 1 and 2.   [facility] 7/5/2019 p/mLAD 95%, mRCA 90%, Cx mild, EDP 92 mmHg  Declined CABG; definitely wants to proceed with PCI.     ·	Continue medical management with aspirin 81 mg daily, metoprolol succinate 50 mg daily, lisinopril 5 mg daily, atorvastatin 40 mg nightly, heparin ggt, clopidogrel 75 mg daily.   ·	Cardiac cath for staged PCI with Dr. Cody Mclean tentatively.     3) PE  Hemodynamically stable and saturating well on RA     ·	Continue heparin ggt until staged PCI and will likely need coumadin in the setting of requiring triple therapy for a brief period of time, although consideration of a DOAC can be explored with aspirin and clopidogrel.     Snow Adamson MD, MPH, LESA  Cardiovascular Specialist Attending  [facility]  C: [phone]  E: [email or web address]  (Cardiology nocturnist available every night 7 pm - 7 am; available week days for follow-up; general cardiology consult coverage weekend days)

## 2019-07-08 NOTE — PROGRESS NOTE ADULT - SUBJECTIVE AND OBJECTIVE BOX
Patient is a 78y old  Female who presents with a chief complaint of CHF (08 Jul 2019 06:06)    SUBJECTIVE / OVERNIGHT EVENTS: no acute events overnight     MEDICATIONS  (STANDING):  aspirin enteric coated 81 milliGRAM(s) Oral daily  atorvastatin 40 milliGRAM(s) Oral at bedtime  furosemide   Injectable 40 milliGRAM(s) IV Push daily  heparin  Infusion.  Unit(s)/Hr (11 mL/Hr) IV Continuous <Continuous>  lisinopril 5 milliGRAM(s) Oral daily  metoprolol succinate ER 25 milliGRAM(s) Oral daily    MEDICATIONS  (PRN):  heparin  Injectable 5000 Unit(s) IV Push every 6 hours PRN For aPTT less than 40  heparin  Injectable 2500 Unit(s) IV Push every 6 hours PRN For aPTT between 40 - 57      Vital Signs Last 24 Hrs  T(C): 36.9 (08 Jul 2019 13:24), Max: 36.9 (08 Jul 2019 13:24)  T(F): 98.4 (08 Jul 2019 13:24), Max: 98.4 (08 Jul 2019 13:24)  HR: 89 (08 Jul 2019 13:24) (86 - 98)  BP: 109/70 (08 Jul 2019 13:24) (109/70 - 113/76)  BP(mean): --  RR: 18 (08 Jul 2019 13:24) (18 - 18)  SpO2: 94% (08 Jul 2019 13:24) (91% - 96%)  CAPILLARY BLOOD GLUCOSE        I&O's Summary    07 Jul 2019 07:01  -  08 Jul 2019 07:00  --------------------------------------------------------  IN: 534 mL / OUT: 1000 mL / NET: -466 mL    08 Jul 2019 07:01  -  08 Jul 2019 15:24  --------------------------------------------------------  IN: 600 mL / OUT: 1800 mL / NET: -1200 mL        PHYSICAL EXAM:  GENERAL: NAD  EYES: conjunctiva and sclera clear  NECK: Supple, No JVD  CHEST/LUNG: Clear to auscultation bilaterally; No wheeze  HEART: +S1/S2, reg   ABDOMEN: Soft, Nontender, Nondistended; Bowel sounds present  EXTREMITIES: no edema noted   PSYCH: AAOx3      LABS:                        12.8   9.35  )-----------( 365      ( 08 Jul 2019 08:26 )             39.9     07-08    136  |  97  |  17  ----------------------------<  102<H>  4.6   |  26  |  0.98    Ca    9.7      08 Jul 2019 06:54      PTT - ( 08 Jul 2019 14:23 )  PTT:81.4 sec  CARDIAC MARKERS ( 06 Jul 2019 22:52 )  x     / x     / 39 U/L / x     / 2.0 ng/mL        Consultant(s) Notes Reviewed:  CT surg, Cardiology

## 2019-07-08 NOTE — PROGRESS NOTE ADULT - PROBLEM SELECTOR PLAN 4
Patient was noted on CT at [facility] to have nonspecific mediastinal and right hilar lymphadenopathy. Lymphadenopathy may possibly be related to malignancy.  Given her pulmonary emboli, patient will require evaluation for malignancy during this hospitalization.  Patient should have CT A/P w/ contrast prior to discharge- Given recent contrast load friday, and repeat contrast planned likely for tomorrow, will defer until after staged PCI if Cr remains stable.

## 2019-07-08 NOTE — PROGRESS NOTE ADULT - PROBLEM SELECTOR PLAN 1
-Presented with chest pressure/tightness/dyspnea on exertion x2 days, elevated trops, ECG with septal infarct and lateral ischemia. s/p LHC 7/3 showing obstruction to p/mLAD 95%, mRCA 90%, Cx mild.   -Patient recommended but declined CABG. c/w medical optimization for staged PCI.  - patient is currently asymptomatic  - monitor on telemetry  - c/w asa, statin, metoprolol, ACEI per cardiology with goal of uptitrating metoprolol and ACEI as tolerated  -discussed with cardiology Dr. Adamson, pt does NOT want CABG. Pending staged PCI, likely will happen tomorrow.

## 2019-07-09 LAB
ANION GAP SERPL CALC-SCNC: 13 MMOL/L — SIGNIFICANT CHANGE UP (ref 5–17)
APTT BLD: 65.4 SEC — HIGH (ref 27.5–36.3)
BUN SERPL-MCNC: 21 MG/DL — SIGNIFICANT CHANGE UP (ref 7–23)
CALCIUM SERPL-MCNC: 9.3 MG/DL — SIGNIFICANT CHANGE UP (ref 8.4–10.5)
CHLORIDE SERPL-SCNC: 99 MMOL/L — SIGNIFICANT CHANGE UP (ref 96–108)
CO2 SERPL-SCNC: 23 MMOL/L — SIGNIFICANT CHANGE UP (ref 22–31)
CREAT SERPL-MCNC: 0.98 MG/DL — SIGNIFICANT CHANGE UP (ref 0.5–1.3)
CULTURE RESULTS: SIGNIFICANT CHANGE UP
CULTURE RESULTS: SIGNIFICANT CHANGE UP
GLUCOSE SERPL-MCNC: 107 MG/DL — HIGH (ref 70–99)
HCT VFR BLD CALC: 35.3 % — SIGNIFICANT CHANGE UP (ref 34.5–45)
HGB BLD-MCNC: 11.9 G/DL — SIGNIFICANT CHANGE UP (ref 11.5–15.5)
INR BLD: 1.12 RATIO — SIGNIFICANT CHANGE UP (ref 0.88–1.16)
MCHC RBC-ENTMCNC: 31.9 PG — SIGNIFICANT CHANGE UP (ref 27–34)
MCHC RBC-ENTMCNC: 33.7 GM/DL — SIGNIFICANT CHANGE UP (ref 32–36)
MCV RBC AUTO: 94.6 FL — SIGNIFICANT CHANGE UP (ref 80–100)
PLATELET # BLD AUTO: 323 K/UL — SIGNIFICANT CHANGE UP (ref 150–400)
POTASSIUM SERPL-MCNC: 3.6 MMOL/L — SIGNIFICANT CHANGE UP (ref 3.5–5.3)
POTASSIUM SERPL-SCNC: 3.6 MMOL/L — SIGNIFICANT CHANGE UP (ref 3.5–5.3)
PROTHROM AB SERPL-ACNC: 12.7 SEC — SIGNIFICANT CHANGE UP (ref 10–13.1)
RBC # BLD: 3.73 M/UL — LOW (ref 3.8–5.2)
RBC # FLD: 13.2 % — SIGNIFICANT CHANGE UP (ref 10.3–14.5)
SODIUM SERPL-SCNC: 135 MMOL/L — SIGNIFICANT CHANGE UP (ref 135–145)
SPECIMEN SOURCE: SIGNIFICANT CHANGE UP
SPECIMEN SOURCE: SIGNIFICANT CHANGE UP
WBC # BLD: 8.26 K/UL — SIGNIFICANT CHANGE UP (ref 3.8–10.5)
WBC # FLD AUTO: 8.26 K/UL — SIGNIFICANT CHANGE UP (ref 3.8–10.5)

## 2019-07-09 PROCEDURE — 99233 SBSQ HOSP IP/OBS HIGH 50: CPT

## 2019-07-09 RX ORDER — METOPROLOL TARTRATE 50 MG
50 TABLET ORAL DAILY
Refills: 0 | Status: DISCONTINUED | OUTPATIENT
Start: 2019-07-09 | End: 2019-07-16

## 2019-07-09 RX ADMIN — LISINOPRIL 5 MILLIGRAM(S): 2.5 TABLET ORAL at 05:39

## 2019-07-09 RX ADMIN — Medication 81 MILLIGRAM(S): at 09:43

## 2019-07-09 RX ADMIN — Medication 25 MILLIGRAM(S): at 05:39

## 2019-07-09 RX ADMIN — HEPARIN SODIUM 1100 UNIT(S)/HR: 5000 INJECTION INTRAVENOUS; SUBCUTANEOUS at 09:39

## 2019-07-09 RX ADMIN — Medication 40 MILLIGRAM(S): at 05:39

## 2019-07-09 NOTE — PROGRESS NOTE ADULT - PROBLEM SELECTOR PLAN 1
-Presented with chest pressure/tightness/dyspnea on exertion x2 days, elevated trops, ECG with septal infarct and lateral ischemia. s/p LHC 7/3 showing obstruction to p/mLAD 95%, mRCA 90%, Cx mild.   -Patient recommended but declined CABG. c/w medical optimization for staged PCI.  - patient is currently asymptomatic  - monitor on telemetry  - c/w asa, statin, metoprolol, ACEI per cardiology with goal of uptitrating metoprolol and ACEI as tolerated  -pt scheduled for staged PCI tomorrow as Dr. Ross not available to do procedure today, discussed with cardiology attending

## 2019-07-09 NOTE — PROGRESS NOTE ADULT - SUBJECTIVE AND OBJECTIVE BOX
SUBJ:  Patient sitting up in bed. No complaints of chest pain, shortness of breath, dizziness, palpitations. Patient has refused CABG. I have spoken to the son on the phone about heart failure management strategy. PCI scheduled for tomorrow.     MEDICATIONS  (STANDING):  aspirin enteric coated 81 milliGRAM(s) Oral daily  atorvastatin 40 milliGRAM(s) Oral at bedtime  furosemide   Injectable 40 milliGRAM(s) IV Push daily  heparin  Infusion.  Unit(s)/Hr (11 mL/Hr) IV Continuous <Continuous>  lisinopril 5 milliGRAM(s) Oral daily  metoprolol succinate ER 25 milliGRAM(s) Oral daily    MEDICATIONS  (PRN):  heparin  Injectable 5000 Unit(s) IV Push every 6 hours PRN For aPTT less than 40  heparin  Injectable 2500 Unit(s) IV Push every 6 hours PRN For aPTT between 40 - 57    Vital Signs Last 24 Hrs  T(C): 36.4 (09 Jul 2019 11:53), Max: 36.7 (08 Jul 2019 20:01)  T(F): 97.6 (09 Jul 2019 11:53), Max: 98 (08 Jul 2019 20:01)  HR: 92 (09 Jul 2019 11:53) (83 - 97)  BP: 121/78 (09 Jul 2019 11:53) (111/74 - 121/78)  BP(mean): --  RR: 18 (09 Jul 2019 11:53) (18 - 18)  SpO2: 97% (09 Jul 2019 11:53) (96% - 97%)  REVIEW OF SYSTEMS:  As per HPI, otherwise unremarkable.     PHYSICAL EXAM:  Appearance: Normal	  HEENT:   Normal oral mucosa  Lymphatic: No lymphadenopathy  Cardiovascular: Normal S1 S2, No edema  Respiratory: decreased breath sound left lung base  Psychiatry: A & O x 3  Gastrointestinal:  Soft, Non-tender  Skin: No rashes, No ecchymoses, No cyanosis	  Neurologic: Non-focal  Extremities: No clubbing, cyanosis.  Vascular: Peripheral pulses palpable 2+ bilaterally    TTE: 7/5/2019  Conclusions:  1. Mitral annular calcification and calcified mitral  leaflets with decreased diastolic opening. Mild-moderate  mitral regurgitation.  2. Calcified trileaflet aortic valve with decreased  opening. Mild aortic regurgitation.  3. Eccentric left ventricular hypertrophy (dilated left  ventricle with normal relative wall thickness).  4. Endocardial visualization enhanced with intravenous  injection of Ultrasonic Enhancing Agent (Definity). Severe  global left ventricular systolic dysfunction. No left  ventricular thrombus.  5. Increased E/e'  is consistent with elevated left  ventricular filling pressure.  6. Inferior vena cava is normal in size (>=1.5cm, <=2.5cm)  with normal respiratory variability consistent with right  atrial pressure 5-10mm Hg.  7. Normal right ventricular size and function.  *** No previous Echo exam.        LABS:                         11.9   8.26  )-----------( 323      ( 09 Jul 2019 09:02 )             35.3   N=x    ; L=x        09 Jul 2019 06:40    135    |  99     |  21     ----------------------------<  107    3.6     |  23     |  0.98     Ca    9.3        09 Jul 2019 06:40      IMPRESSION AND PLAN:  78y Female patient with no significant past medical history presenting with new diagnosis decompensated systolic congestive heart failure and NSTEMI.     1) Ischemic cardiomyopathy, NYHA class II  EF 15-20%   BNP 8480    ·	Continue diuresis with furosemide 40 mg daily  ·	Increase metoprolol succinate 50 mg daily.   ·	D/C lisinopril and after 36hrs of discontinuation, start entresto 24mg/26mg po bid  ·	start spironolactone 12.5mg po daily in AM.   ·	Salt restriction, fluid restriction <1.5L/day.   ·	Replete electrolytes as needed.     2) CAD   NSTEMI  [facility] 7/5/2019 p/mLAD 95%, mRCA 90%, Cx mild, EDP 92 mmHg  Declined CABG; definitely wants to proceed with PCI.     ·	Continue medical management with aspirin 81 mg daily, metoprolol succinate 50 mg daily, entresto, atorvastatin 40 mg nightly, clopidogrel 75 mg daily.   ·	Cardiac cath for staged PCI with Dr. Ross in AM tentatively.     3) PE  Hemodynamically stable and saturating well on RA     Continue heparin ggt until staged PCI and will likely need coumadin in the setting of requiring triple therapy for a brief period of time    4.) Deep venous thrombosis prophylaxis: heparin drip for nstemi/pulmonary embolism    Thank you. My team will follow.    Dolly Arango M.D, F.A.C.C  [facility]   [phone] SUBJ:  Patient sitting up in bed. No complaints of chest pain, shortness of breath, dizziness, palpitations. Patient has refused CABG. I have spoken to the son on the phone about heart failure management strategy. PCI scheduled for tomorrow.     MEDICATIONS  (STANDING):  aspirin enteric coated 81 milliGRAM(s) Oral daily  atorvastatin 40 milliGRAM(s) Oral at bedtime  furosemide   Injectable 40 milliGRAM(s) IV Push daily  heparin  Infusion.  Unit(s)/Hr (11 mL/Hr) IV Continuous <Continuous>  lisinopril 5 milliGRAM(s) Oral daily  metoprolol succinate ER 25 milliGRAM(s) Oral daily    MEDICATIONS  (PRN):  heparin  Injectable 5000 Unit(s) IV Push every 6 hours PRN For aPTT less than 40  heparin  Injectable 2500 Unit(s) IV Push every 6 hours PRN For aPTT between 40 - 57    Vital Signs Last 24 Hrs  T(C): 36.4 (09 Jul 2019 11:53), Max: 36.7 (08 Jul 2019 20:01)  T(F): 97.6 (09 Jul 2019 11:53), Max: 98 (08 Jul 2019 20:01)  HR: 92 (09 Jul 2019 11:53) (83 - 97)  BP: 121/78 (09 Jul 2019 11:53) (111/74 - 121/78)  BP(mean): --  RR: 18 (09 Jul 2019 11:53) (18 - 18)  SpO2: 97% (09 Jul 2019 11:53) (96% - 97%)  REVIEW OF SYSTEMS:  As per HPI, otherwise unremarkable.     PHYSICAL EXAM:  Appearance: Normal	  HEENT:   Normal oral mucosa  Lymphatic: No lymphadenopathy  Cardiovascular: Normal S1 S2, No edema  Respiratory: decreased breath sound left lung base  Psychiatry: A & O x 3  Gastrointestinal:  Soft, Non-tender  Skin: No rashes, No ecchymoses, No cyanosis	  Neurologic: Non-focal  Extremities: No clubbing, cyanosis.  Vascular: Peripheral pulses palpable 2+ bilaterally    TTE: 7/5/2019  Conclusions:  1. Mitral annular calcification and calcified mitral  leaflets with decreased diastolic opening. Mild-moderate  mitral regurgitation.  2. Calcified trileaflet aortic valve with decreased  opening. Mild aortic regurgitation.  3. Eccentric left ventricular hypertrophy (dilated left  ventricle with normal relative wall thickness).  4. Endocardial visualization enhanced with intravenous  injection of Ultrasonic Enhancing Agent (Definity). Severe  global left ventricular systolic dysfunction. No left  ventricular thrombus.  5. Increased E/e'  is consistent with elevated left  ventricular filling pressure.  6. Inferior vena cava is normal in size (>=1.5cm, <=2.5cm)  with normal respiratory variability consistent with right  atrial pressure 5-10mm Hg.  7. Normal right ventricular size and function.  *** No previous Echo exam.        LABS:                         11.9   8.26  )-----------( 323      ( 09 Jul 2019 09:02 )             35.3   N=x    ; L=x        09 Jul 2019 06:40    135    |  99     |  21     ----------------------------<  107    3.6     |  23     |  0.98     Ca    9.3        09 Jul 2019 06:40      IMPRESSION AND PLAN:  78y Female patient with no significant past medical history presenting with new diagnosis decompensated systolic congestive heart failure and NSTEMI.     1) Ischemic cardiomyopathy, NYHA class II  EF 15-20%   BNP 8480    ·	Continue diuresis with furosemide 40 mg daily  ·	Increase metoprolol succinate 50 mg daily.   ·	D/C lisinopril and after 36hrs of discontinuation, start entresto 24mg/26mg po bid  ·	start spironolactone 12.5mg po daily in AM.   ·	Salt restriction, fluid restriction <1.5L/day.   ·	Replete electrolytes as needed.   ·	Repeat TTE for evaluation of LV function in 4 weeks and then again in 3 months.     2) CAD   NSTEMI  [facility] 7/5/2019 p/mLAD 95%, mRCA 90%, Cx mild, EDP 92 mmHg  Declined CABG; definitely wants to proceed with PCI.     ·	Continue medical management with aspirin 81 mg daily, metoprolol succinate 50 mg daily, entresto, atorvastatin 40 mg nightly, clopidogrel 75 mg daily.   ·	Cardiac cath for staged PCI with Dr. Ross in AM tentatively.     3) PE  Hemodynamically stable and saturating well on RA   Continue heparin ggt until staged PCI and will likely need coumadin in the setting of requiring triple therapy for a brief period of time    4.) Deep venous thrombosis prophylaxis: heparin drip for nstemi/pulmonary embolism    Thank you. My team will follow.    Dolly Arango M.D, F.A.C.C  [facility]   [phone]

## 2019-07-09 NOTE — PROGRESS NOTE ADULT - SUBJECTIVE AND OBJECTIVE BOX
Patient is a 78y old  Female who presents with a chief complaint of CHF (09 Jul 2019 14:56)    SUBJECTIVE / OVERNIGHT EVENTS: no acute events overnight     MEDICATIONS  (STANDING):  aspirin enteric coated 81 milliGRAM(s) Oral daily  atorvastatin 40 milliGRAM(s) Oral at bedtime  furosemide   Injectable 40 milliGRAM(s) IV Push daily  heparin  Infusion.  Unit(s)/Hr (11 mL/Hr) IV Continuous <Continuous>  lisinopril 5 milliGRAM(s) Oral daily  metoprolol succinate ER 25 milliGRAM(s) Oral daily    MEDICATIONS  (PRN):  heparin  Injectable 5000 Unit(s) IV Push every 6 hours PRN For aPTT less than 40  heparin  Injectable 2500 Unit(s) IV Push every 6 hours PRN For aPTT between 40 - 57      Vital Signs Last 24 Hrs  T(C): 36.4 (09 Jul 2019 11:53), Max: 36.7 (08 Jul 2019 20:01)  T(F): 97.6 (09 Jul 2019 11:53), Max: 98 (08 Jul 2019 20:01)  HR: 92 (09 Jul 2019 11:53) (83 - 97)  BP: 121/78 (09 Jul 2019 11:53) (111/74 - 121/78)  BP(mean): --  RR: 18 (09 Jul 2019 11:53) (18 - 18)  SpO2: 97% (09 Jul 2019 11:53) (96% - 97%)  CAPILLARY BLOOD GLUCOSE        I&O's Summary    08 Jul 2019 07:01  -  09 Jul 2019 07:00  --------------------------------------------------------  IN: 732 mL / OUT: 2800 mL / NET: -2068 mL    09 Jul 2019 07:01  -  09 Jul 2019 15:14  --------------------------------------------------------  IN: 720 mL / OUT: 1050 mL / NET: -330 mL      PHYSICAL EXAM:  GENERAL: NAD  EYES: EOMI, PERRLA, conjunctiva and sclera clear  NECK: Supple, No JVD  CHEST/LUNG: Clear to auscultation bilaterally; No wheeze  HEART: +S1/S2, reg   ABDOMEN: Soft, Nontender, Nondistended; Bowel sounds present  EXTREMITIES: no peripheral edema noted   PSYCH: AAOx3      LABS:                        11.9   8.26  )-----------( 323      ( 09 Jul 2019 09:02 )             35.3     07-09    135  |  99  |  21  ----------------------------<  107<H>  3.6   |  23  |  0.98    Ca    9.3      09 Jul 2019 06:40      PT/INR - ( 09 Jul 2019 07:51 )   PT: 12.7 sec;   INR: 1.12 ratio         PTT - ( 09 Jul 2019 07:51 )  PTT:65.4 sec        Care Discussed with Consultants/Other Providers: Cardiology Attending reg staged PCI

## 2019-07-09 NOTE — PROGRESS NOTE ADULT - PROBLEM SELECTOR PLAN 4
Patient was noted on CT at [facility] to have nonspecific mediastinal and right hilar lymphadenopathy. Lymphadenopathy may possibly be related to malignancy.  Given her pulmonary emboli, patient will require evaluation for malignancy during this hospitalization.  Patient should have CT A/P w/ contrast prior to discharge- Given recent contrast load friday, and repeat contrast planned likely for tomorrow, will defer until after staged PCI if Cr remains stable.

## 2019-07-09 NOTE — PROGRESS NOTE ADULT - PROBLEM SELECTOR PLAN 2
TTE EF 25%, severe global LVSD, grade III diastolic dysfunction, moderately reduced RVSF, mild-mod TR, mild-mod AR, mild AS, and mod pHTN. CTA with b/l pleural effusions and likely pulm edema. s/p IV diuresis.  - breathing comfortably sating mid 90s on RA. Trace LE edema. She appears euvolemic at this time  - c/w lasix 40 IV daily per cards, monitor I/O  - c/w toprol 25 daily and lisinopri 5 daily per cards  - repeat TTE  - to be started on entresto + spirono post procedure

## 2019-07-10 LAB
ANION GAP SERPL CALC-SCNC: 13 MMOL/L — SIGNIFICANT CHANGE UP (ref 5–17)
APTT BLD: 58.4 SEC — HIGH (ref 27.5–36.3)
BUN SERPL-MCNC: 22 MG/DL — SIGNIFICANT CHANGE UP (ref 7–23)
CALCIUM SERPL-MCNC: 9.6 MG/DL — SIGNIFICANT CHANGE UP (ref 8.4–10.5)
CHLORIDE SERPL-SCNC: 98 MMOL/L — SIGNIFICANT CHANGE UP (ref 96–108)
CO2 SERPL-SCNC: 25 MMOL/L — SIGNIFICANT CHANGE UP (ref 22–31)
CREAT SERPL-MCNC: 1 MG/DL — SIGNIFICANT CHANGE UP (ref 0.5–1.3)
GLUCOSE SERPL-MCNC: 109 MG/DL — HIGH (ref 70–99)
HCT VFR BLD CALC: 37.9 % — SIGNIFICANT CHANGE UP (ref 34.5–45)
HGB BLD-MCNC: 12.5 G/DL — SIGNIFICANT CHANGE UP (ref 11.5–15.5)
MAGNESIUM SERPL-MCNC: 2.3 MG/DL — SIGNIFICANT CHANGE UP (ref 1.6–2.6)
MCHC RBC-ENTMCNC: 32.1 PG — SIGNIFICANT CHANGE UP (ref 27–34)
MCHC RBC-ENTMCNC: 33 GM/DL — SIGNIFICANT CHANGE UP (ref 32–36)
MCV RBC AUTO: 97.2 FL — SIGNIFICANT CHANGE UP (ref 80–100)
PHOSPHATE SERPL-MCNC: 4.6 MG/DL — HIGH (ref 2.5–4.5)
PLATELET # BLD AUTO: 317 K/UL — SIGNIFICANT CHANGE UP (ref 150–400)
POTASSIUM SERPL-MCNC: 3.9 MMOL/L — SIGNIFICANT CHANGE UP (ref 3.5–5.3)
POTASSIUM SERPL-SCNC: 3.9 MMOL/L — SIGNIFICANT CHANGE UP (ref 3.5–5.3)
RBC # BLD: 3.9 M/UL — SIGNIFICANT CHANGE UP (ref 3.8–5.2)
RBC # FLD: 13.3 % — SIGNIFICANT CHANGE UP (ref 10.3–14.5)
SODIUM SERPL-SCNC: 136 MMOL/L — SIGNIFICANT CHANGE UP (ref 135–145)
WBC # BLD: 8.27 K/UL — SIGNIFICANT CHANGE UP (ref 3.8–10.5)
WBC # FLD AUTO: 8.27 K/UL — SIGNIFICANT CHANGE UP (ref 3.8–10.5)

## 2019-07-10 PROCEDURE — 99152 MOD SED SAME PHYS/QHP 5/>YRS: CPT | Mod: GC

## 2019-07-10 PROCEDURE — 93010 ELECTROCARDIOGRAM REPORT: CPT

## 2019-07-10 PROCEDURE — 33990 INSJ PERQ VAD L HRT ARTERIAL: CPT | Mod: GC

## 2019-07-10 PROCEDURE — 99233 SBSQ HOSP IP/OBS HIGH 50: CPT

## 2019-07-10 PROCEDURE — 92933 PRQ TRLML C ATHRC ST ANGIOP1: CPT | Mod: LD,GC

## 2019-07-10 RX ORDER — SACUBITRIL AND VALSARTAN 24; 26 MG/1; MG/1
1 TABLET, FILM COATED ORAL
Refills: 0 | Status: DISCONTINUED | OUTPATIENT
Start: 2019-07-11 | End: 2019-07-16

## 2019-07-10 RX ORDER — HEPARIN SODIUM 5000 [USP'U]/ML
INJECTION INTRAVENOUS; SUBCUTANEOUS
Qty: 25000 | Refills: 0 | Status: DISCONTINUED | OUTPATIENT
Start: 2019-07-10 | End: 2019-07-14

## 2019-07-10 RX ORDER — CLOPIDOGREL BISULFATE 75 MG/1
75 TABLET, FILM COATED ORAL DAILY
Refills: 0 | Status: DISCONTINUED | OUTPATIENT
Start: 2019-07-11 | End: 2019-07-16

## 2019-07-10 RX ORDER — SPIRONOLACTONE 25 MG/1
12.5 TABLET, FILM COATED ORAL DAILY
Refills: 0 | Status: DISCONTINUED | OUTPATIENT
Start: 2019-07-10 | End: 2019-07-16

## 2019-07-10 RX ADMIN — Medication 50 MILLIGRAM(S): at 05:18

## 2019-07-10 RX ADMIN — Medication 81 MILLIGRAM(S): at 10:39

## 2019-07-10 RX ADMIN — SPIRONOLACTONE 12.5 MILLIGRAM(S): 25 TABLET, FILM COATED ORAL at 10:39

## 2019-07-10 RX ADMIN — HEPARIN SODIUM 1100 UNIT(S)/HR: 5000 INJECTION INTRAVENOUS; SUBCUTANEOUS at 10:48

## 2019-07-10 RX ADMIN — Medication 40 MILLIGRAM(S): at 05:18

## 2019-07-10 NOTE — PROGRESS NOTE ADULT - PROBLEM SELECTOR PLAN 1
-Presented with chest pressure/tightness/dyspnea on exertion x2 days, elevated trops, ECG with septal infarct and lateral ischemia.  -s/p LHC 7/3 showing obstruction to p/mLAD 95%, mRCA 90%, Cx mild.   -pt declined CABG  -discussed with cards Dr Arango, staged PCI today and roseanne  -c/w asa, statin, metoprolol 50mg ER daily

## 2019-07-10 NOTE — PROGRESS NOTE ADULT - PROBLEM SELECTOR PLAN 4
CT at [facility] to have nonspecific mediastinal and right hilar lymphadenopathy. Lymphadenopathy may possibly be related to malignancy.  -Given her pulmonary emboli, patient will require evaluation for malignancy  -pt needs CT A/P w/ contrast- Given recent contrast load friday, and repeat contrast planned likely for today and roseanne, IV diuresis, AEC, will defer until after staged PCI if Cr remains stable vs outpatient  -She reports she had normal pap smears in the past. She has never had a colonoscopy or mammogram.   -recommended to her and son to get age appropriate cancer screening as outpatient post discharge

## 2019-07-10 NOTE — PROGRESS NOTE ADULT - PROBLEM SELECTOR PLAN 3
CTA showed PE involving b/l upper lobes, R middle and lower lobe segmental pulmonary arteries. b/l LE dopplers negative for DVT.  - satting well on RA  - c/w hep gtt, monitor PTT, will eventually transition to oral a/c post PCI  - LE Duplex negative for DVT  - Malignancy workup as below

## 2019-07-10 NOTE — CHART NOTE - NSCHARTNOTEFT_GEN_A_CORE
Patient underwent a PCI procedure and is being admitted as they are at increased risk for major adverse cardiac and vascular events if discharged due to the following high risk characteristics:  proximal LAD lesion  s/p Impella assisted PCI X2 prox/ mid LAD close with Perclose- Right femoral artery  site is soft /dry/ non tender in recovery.   ASA/ Plavix  resume Heparin gtt 6 hours at midnight if cath site stable  bedrest overnight   discussed plan with Dr. Ross   report called to floor NP

## 2019-07-10 NOTE — PROGRESS NOTE ADULT - PROBLEM SELECTOR PLAN 2
TTE EF 25%, severe global LVSD, grade III diastolic dysfunction, moderately reduced RVSF, mild-mod TR, mild-mod AR, mild AS, and mod pHTN  -s/p lisinopril, plan to start entresto 24/26mg roseanne per cards (36 hours post lisinopril)   - c/w lasix 40 IV daily, spironolactone 12.5mg daily  - c/w toprol 50mg daily  - monitor I/O, daily weights

## 2019-07-10 NOTE — PROGRESS NOTE ADULT - ASSESSMENT
78y F w/ no prior PMH initially presenting with chest tightness/pressure and DOEx2 days found to have NSTEMI, new onset acute systolic HF, pulmonary emboli, incidentally found on CTPA to have lymphadenopathy, now s/p LHC showing multivessel disease, declined CABG, is undergoing medical optimization of HF and staged PCI.

## 2019-07-10 NOTE — PROGRESS NOTE ADULT - SUBJECTIVE AND OBJECTIVE BOX
Patient is a 78y old  Female who presents with a chief complaint of CHF (09 Jul 2019 14:56)      SUBJECTIVE / OVERNIGHT EVENTS: No overnight events. Feels well, denies cp, sob, leg swelling, bleeding, melena.     Tele reviewed: sinus 70-90, pvcs, bigem, trigem    MEDICATIONS  (STANDING):  aspirin enteric coated 81 milliGRAM(s) Oral daily  atorvastatin 40 milliGRAM(s) Oral at bedtime  furosemide   Injectable 40 milliGRAM(s) IV Push daily  heparin  Infusion.  Unit(s)/Hr (11 mL/Hr) IV Continuous <Continuous>  metoprolol succinate ER 50 milliGRAM(s) Oral daily  spironolactone 12.5 milliGRAM(s) Oral daily    MEDICATIONS  (PRN):  heparin  Injectable 5000 Unit(s) IV Push every 6 hours PRN For aPTT less than 40  heparin  Injectable 2500 Unit(s) IV Push every 6 hours PRN For aPTT between 40 - 57      Vital Signs Last 24 Hrs  T(C): 36.7 (10 Jul 2019 11:40), Max: 36.7 (09 Jul 2019 20:19)  T(F): 98 (10 Jul 2019 11:40), Max: 98.1 (09 Jul 2019 20:19)  HR: 84 (10 Jul 2019 11:40) (84 - 92)  BP: 119/75 (10 Jul 2019 11:40) (114/68 - 141/88)  BP(mean): --  RR: 18 (10 Jul 2019 11:40) (17 - 18)  SpO2: 96% (10 Jul 2019 11:40) (94% - 97%)  CAPILLARY BLOOD GLUCOSE        I&O's Summary    09 Jul 2019 07:01  -  10 Jul 2019 07:00  --------------------------------------------------------  IN: 1184 mL / OUT: 2050 mL / NET: -866 mL    10 Jul 2019 07:01  -  10 Jul 2019 12:16  --------------------------------------------------------  IN: 480 mL / OUT: 800 mL / NET: -320 mL        PHYSICAL EXAM:  GENERAL: NAD, well-developed  HEAD:  Atraumatic, Normocephalic  NECK: Supple, No JVD  CHEST/LUNG: Clear to auscultation bilaterally; No wheeze  HEART: Regular rate and rhythm;   ABDOMEN: Soft, Nontender, Nondistended; Bowel sounds present  EXTREMITIES:  2+ Peripheral Pulses, trace ankle edema b/l  PSYCH: AAOx3  NEUROLOGY: non-focal      LABS:                        12.5   8.27  )-----------( 317      ( 10 Jul 2019 10:23 )             37.9     07-10    136  |  98  |  22  ----------------------------<  109<H>  3.9   |  25  |  1.00    Ca    9.6      10 Jul 2019 05:48  Phos  4.6     07-10  Mg     2.3     07-10      PT/INR - ( 09 Jul 2019 07:51 )   PT: 12.7 sec;   INR: 1.12 ratio         PTT - ( 10 Jul 2019 10:04 )  PTT:58.4 sec              RADIOLOGY & ADDITIONAL TESTS:    Imaging Personally Reviewed: CTA chest:  IMPRESSION:    Emboli in bilateral upper lobe, right middle lobe and right lower lobe   segmental pulmonary arteries. Nonspecific interlobular septal thickening   and groundglass/patchy opacities in bilateral upper and lower lobes,   which can be seen in setting of pulmonary edema given cardiomegaly and   pleural effusion although pneumonia cannot be excluded. Please note that   pulmonary infarct cannot be excluded in this setting.      Nonspecific mediastinal and right hilar lymphadenopathy.      Consultant(s) Notes Reviewed:  all    Care Discussed with Consultants/Other Providers:

## 2019-07-11 LAB
ANION GAP SERPL CALC-SCNC: 15 MMOL/L — SIGNIFICANT CHANGE UP (ref 5–17)
APTT BLD: 124.8 SEC — CRITICAL HIGH (ref 27.5–36.3)
APTT BLD: 53.2 SEC — HIGH (ref 27.5–36.3)
APTT BLD: 95.2 SEC — HIGH (ref 27.5–36.3)
BUN SERPL-MCNC: 21 MG/DL — SIGNIFICANT CHANGE UP (ref 7–23)
CALCIUM SERPL-MCNC: 9.4 MG/DL — SIGNIFICANT CHANGE UP (ref 8.4–10.5)
CHLORIDE SERPL-SCNC: 98 MMOL/L — SIGNIFICANT CHANGE UP (ref 96–108)
CO2 SERPL-SCNC: 23 MMOL/L — SIGNIFICANT CHANGE UP (ref 22–31)
CREAT SERPL-MCNC: 1.01 MG/DL — SIGNIFICANT CHANGE UP (ref 0.5–1.3)
GLUCOSE SERPL-MCNC: 118 MG/DL — HIGH (ref 70–99)
HCT VFR BLD CALC: 38.8 % — SIGNIFICANT CHANGE UP (ref 34.5–45)
HGB BLD-MCNC: 12.6 G/DL — SIGNIFICANT CHANGE UP (ref 11.5–15.5)
INR BLD: 1.08 RATIO — SIGNIFICANT CHANGE UP (ref 0.88–1.16)
MAGNESIUM SERPL-MCNC: 2.3 MG/DL — SIGNIFICANT CHANGE UP (ref 1.6–2.6)
MCHC RBC-ENTMCNC: 31.5 PG — SIGNIFICANT CHANGE UP (ref 27–34)
MCHC RBC-ENTMCNC: 32.5 GM/DL — SIGNIFICANT CHANGE UP (ref 32–36)
MCV RBC AUTO: 97 FL — SIGNIFICANT CHANGE UP (ref 80–100)
PLATELET # BLD AUTO: 322 K/UL — SIGNIFICANT CHANGE UP (ref 150–400)
POTASSIUM SERPL-MCNC: 4.2 MMOL/L — SIGNIFICANT CHANGE UP (ref 3.5–5.3)
POTASSIUM SERPL-SCNC: 4.2 MMOL/L — SIGNIFICANT CHANGE UP (ref 3.5–5.3)
PROTHROM AB SERPL-ACNC: 12.4 SEC — SIGNIFICANT CHANGE UP (ref 10–13.1)
RBC # BLD: 4 M/UL — SIGNIFICANT CHANGE UP (ref 3.8–5.2)
RBC # FLD: 13.3 % — SIGNIFICANT CHANGE UP (ref 10.3–14.5)
SODIUM SERPL-SCNC: 136 MMOL/L — SIGNIFICANT CHANGE UP (ref 135–145)
WBC # BLD: 9.62 K/UL — SIGNIFICANT CHANGE UP (ref 3.8–10.5)
WBC # FLD AUTO: 9.62 K/UL — SIGNIFICANT CHANGE UP (ref 3.8–10.5)

## 2019-07-11 PROCEDURE — 99233 SBSQ HOSP IP/OBS HIGH 50: CPT

## 2019-07-11 PROCEDURE — 93010 ELECTROCARDIOGRAM REPORT: CPT

## 2019-07-11 RX ORDER — WARFARIN SODIUM 2.5 MG/1
5 TABLET ORAL ONCE
Refills: 0 | Status: COMPLETED | OUTPATIENT
Start: 2019-07-11 | End: 2019-07-11

## 2019-07-11 RX ADMIN — HEPARIN SODIUM 1100 UNIT(S)/HR: 5000 INJECTION INTRAVENOUS; SUBCUTANEOUS at 00:14

## 2019-07-11 RX ADMIN — CLOPIDOGREL BISULFATE 75 MILLIGRAM(S): 75 TABLET, FILM COATED ORAL at 12:14

## 2019-07-11 RX ADMIN — HEPARIN SODIUM 2500 UNIT(S): 5000 INJECTION INTRAVENOUS; SUBCUTANEOUS at 10:13

## 2019-07-11 RX ADMIN — SACUBITRIL AND VALSARTAN 1 TABLET(S): 24; 26 TABLET, FILM COATED ORAL at 05:46

## 2019-07-11 RX ADMIN — HEPARIN SODIUM 1200 UNIT(S)/HR: 5000 INJECTION INTRAVENOUS; SUBCUTANEOUS at 10:00

## 2019-07-11 RX ADMIN — SACUBITRIL AND VALSARTAN 1 TABLET(S): 24; 26 TABLET, FILM COATED ORAL at 17:37

## 2019-07-11 RX ADMIN — HEPARIN SODIUM 1100 UNIT(S)/HR: 5000 INJECTION INTRAVENOUS; SUBCUTANEOUS at 16:36

## 2019-07-11 RX ADMIN — SPIRONOLACTONE 12.5 MILLIGRAM(S): 25 TABLET, FILM COATED ORAL at 05:09

## 2019-07-11 RX ADMIN — Medication 50 MILLIGRAM(S): at 05:10

## 2019-07-11 RX ADMIN — Medication 40 MILLIGRAM(S): at 05:09

## 2019-07-11 RX ADMIN — Medication 81 MILLIGRAM(S): at 13:50

## 2019-07-11 RX ADMIN — HEPARIN SODIUM 1100 UNIT(S)/HR: 5000 INJECTION INTRAVENOUS; SUBCUTANEOUS at 23:25

## 2019-07-11 RX ADMIN — WARFARIN SODIUM 5 MILLIGRAM(S): 2.5 TABLET ORAL at 21:12

## 2019-07-11 NOTE — PROGRESS NOTE ADULT - PROBLEM SELECTOR PLAN 3
CTA showed PE involving b/l upper lobes, R middle and lower lobe segmental pulmonary arteries. b/l LE dopplers negative for DVT. HD stable, on RA  - discussed with cards DR Adamson, c/w heparin ggt to coumadin bridge tonight, monitor PTT/INR  - LE Duplex negative for DVT  - Malignancy workup as below

## 2019-07-11 NOTE — PROGRESS NOTE ADULT - ASSESSMENT
77 yo F w/ no prior PMH who presented with chest pressure, found to have NSTEMI and new onset acute systolic HF and PE, s/p LHC showing multivessel disease, declined CABG, now s/p Impella assisted PCI x 2 to LAD. Vital signs and labs stable post procedure.    - Continue aspirin 81mg daily and Plavix 75mg daily  - On heparin gtt for PE  - Continue atorvastatin 40mg nightly  - Rest of management per general cardiology and primary service    Nancy Mars MD  Cardiology Fellow  [phone]  All Cardiology service information can be found 24/7 on [email or web address], password: [email or web address] 77 yo F w/ no prior PMH who presented with chest pressure, found to have NSTEMI and new onset acute systolic HF and PE, s/p LHC showing multivessel disease, declined CABG, now s/p Impella assisted PCI x 2 to LAD. Vital signs and labs stable post procedure.    - Continue aspirin 81mg daily and Plavix 75mg daily  - On heparin gtt for PE  - Continue atorvastatin 40mg nightly  - No contraindication for discharge from interventional cardiology standpoint  - Rest of management per general cardiology and primary service    Nancy Mars MD  Cardiology Fellow  [phone]  All Cardiology service information can be found 24/7 on [email or web address], password: [email or web address]

## 2019-07-11 NOTE — DIETITIAN INITIAL EVALUATION ADULT. - REASON INDICATOR FOR ASSESSMENT
Nutrition Assessment warranted for length of stay.   Information obtained from: pt, electronic medical record

## 2019-07-11 NOTE — PROGRESS NOTE ADULT - SUBJECTIVE AND OBJECTIVE BOX
Patient seen and examined at bedside.    Overnight Events:     Review Of Systems: No chest pain, shortness of breath, or palpitations            Current Meds:  aspirin enteric coated 81 milliGRAM(s) Oral daily  atorvastatin 40 milliGRAM(s) Oral at bedtime  clopidogrel Tablet 75 milliGRAM(s) Oral daily  furosemide   Injectable 40 milliGRAM(s) IV Push daily  heparin  Infusion.  Unit(s)/Hr IV Continuous <Continuous>  heparin  Injectable 5000 Unit(s) IV Push every 6 hours PRN  heparin  Injectable 2500 Unit(s) IV Push every 6 hours PRN  metoprolol succinate ER 50 milliGRAM(s) Oral daily  sacubitril 24 mG/valsartan 26 mG 1 Tablet(s) Oral two times a day  spironolactone 12.5 milliGRAM(s) Oral daily      Vitals:  T(F): 98.3 (07-11), Max: 98.3 (07-11)  HR: 82 (07-11) (81 - 98)  BP: 106/67 (07-11) (106/67 - 158/98)  RR: 18 (07-11)  SpO2: 95% (07-11)  I&O's Summary    10 Jul 2019 07:01  -  11 Jul 2019 07:00  --------------------------------------------------------  IN: 768 mL / OUT: 3300 mL / NET: -2532 mL        Physical Exam:  Appearance: No acute distress; well appearing  Eyes: PERRL, EOMI, pink conjunctiva  HEENT: Normal oral mucosa  Cardiovascular: RRR, S1, S2, no murmurs, rubs, or gallops; no edema; no JVD  Respiratory: Clear to auscultation bilaterally  Gastrointestinal: soft, non-tender, non-distended with normal bowel sounds  Musculoskeletal: No clubbing; no joint deformity   Neurologic: Non-focal  Lymphatic: No lymphadenopathy  Psychiatry: AAOx3, mood & affect appropriate  Skin: No rashes, ecchymoses, or cyanosis                          12.6   9.62  )-----------( 322      ( 11 Jul 2019 08:19 )             38.8     07-11    136  |  98  |  21  ----------------------------<  118<H>  4.2   |  23  |  1.01    Ca    9.4      11 Jul 2019 06:54  Phos  4.6     07-10  Mg     2.3     07-11      PTT - ( 10 Jul 2019 10:04 )  PTT:58.4 sec  CARDIAC MARKERS ( 08 Jul 2019 06:54 )  99 ng/L / x     / x     / x     / x     / x      CARDIAC MARKERS ( 07 Jul 2019 06:52 )  126 ng/L / x     / x     / x     / x     / x      CARDIAC MARKERS ( 06 Jul 2019 22:52 )  144 ng/L / x     / x     / 39 U/L / x     / 2.0 ng/mL  CARDIAC MARKERS ( 06 Jul 2019 14:42 )  141 ng/L / x     / x     / 57 U/L / x     / 2.1 ng/mL  CARDIAC MARKERS ( 06 Jul 2019 11:51 )  137 ng/L / x     / x     / 43 U/L / x     / 2.4 ng/mL    Echo:  < from: TTE with Doppler (w/Cont) (07.05.19 @ 18:56) >  Conclusions:  1. Mitral annular calcification and calcified mitral  leaflets with decreased diastolic opening. Mild-moderate  mitral regurgitation.  2. Calcified trileaflet aortic valve with decreased  opening. Mild aortic regurgitation.  3. Eccentric left ventricular hypertrophy (dilated left  ventricle with normal relative wall thickness).  4. Endocardial visualization enhanced with intravenous  injection of Ultrasonic Enhancing Agent (Definity). Severe  global left ventricular systolic dysfunction. No left  ventricular thrombus.  5. Increased E/e'  is consistent with elevated left  ventricular filling pressure.  6. Inferior vena cava is normal in size (>=1.5cm, <=2.5cm)  with normal respiratory variability consistent with right  atrial pressure 5-10mm Hg.  7. Normal right ventricular size and function. Patient seen and examined at bedside.    Overnight Events:     Review Of Systems: No chest pain, shortness of breath, or palpitations            Current Meds:  aspirin enteric coated 81 milliGRAM(s) Oral daily  atorvastatin 40 milliGRAM(s) Oral at bedtime  clopidogrel Tablet 75 milliGRAM(s) Oral daily  furosemide   Injectable 40 milliGRAM(s) IV Push daily  heparin  Infusion.  Unit(s)/Hr IV Continuous <Continuous>  heparin  Injectable 5000 Unit(s) IV Push every 6 hours PRN  heparin  Injectable 2500 Unit(s) IV Push every 6 hours PRN  metoprolol succinate ER 50 milliGRAM(s) Oral daily  sacubitril 24 mG/valsartan 26 mG 1 Tablet(s) Oral two times a day  spironolactone 12.5 milliGRAM(s) Oral daily      Vitals:  T(F): 98.3 (07-11), Max: 98.3 (07-11)  HR: 82 (07-11) (81 - 98)  BP: 106/67 (07-11) (106/67 - 158/98)  RR: 18 (07-11)  SpO2: 95% (07-11)  I&O's Summary    10 Jul 2019 07:01  -  11 Jul 2019 07:00  --------------------------------------------------------  IN: 768 mL / OUT: 3300 mL / NET: -2532 mL        Physical Exam:  Appearance: No acute distress; well appearing  Eyes: PERRL, EOMI, pink conjunctiva  HEENT: Normal oral mucosa  Cardiovascular: RRR, S1, S2, no murmurs, rubs, or gallops; no edema; no JVD, right groin site without hematoma, distal pulses intact  Respiratory: Clear to auscultation bilaterally  Gastrointestinal: soft, non-tender  Musculoskeletal: No clubbing; no joint deformity   Neurologic: Non-focal  Lymphatic: No lymphadenopathy  Psychiatry: AAOx3, mood & affect appropriate  Skin: No rashes                        12.6   9.62  )-----------( 322      ( 11 Jul 2019 08:19 )             38.8     07-11    136  |  98  |  21  ----------------------------<  118<H>  4.2   |  23  |  1.01    Ca    9.4      11 Jul 2019 06:54  Phos  4.6     07-10  Mg     2.3     07-11      PTT - ( 10 Jul 2019 10:04 )  PTT:58.4 sec  CARDIAC MARKERS ( 08 Jul 2019 06:54 )  99 ng/L / x     / x     / x     / x     / x      CARDIAC MARKERS ( 07 Jul 2019 06:52 )  126 ng/L / x     / x     / x     / x     / x      CARDIAC MARKERS ( 06 Jul 2019 22:52 )  144 ng/L / x     / x     / 39 U/L / x     / 2.0 ng/mL  CARDIAC MARKERS ( 06 Jul 2019 14:42 )  141 ng/L / x     / x     / 57 U/L / x     / 2.1 ng/mL  CARDIAC MARKERS ( 06 Jul 2019 11:51 )  137 ng/L / x     / x     / 43 U/L / x     / 2.4 ng/mL    Echo:  < from: TTE with Doppler (w/Cont) (07.05.19 @ 18:56) >  Conclusions:  1. Mitral annular calcification and calcified mitral  leaflets with decreased diastolic opening. Mild-moderate  mitral regurgitation.  2. Calcified trileaflet aortic valve with decreased  opening. Mild aortic regurgitation.  3. Eccentric left ventricular hypertrophy (dilated left  ventricle with normal relative wall thickness).  4. Endocardial visualization enhanced with intravenous  injection of Ultrasonic Enhancing Agent (Definity). Severe  global left ventricular systolic dysfunction. No left  ventricular thrombus.  5. Increased E/e'  is consistent with elevated left  ventricular filling pressure.  6. Inferior vena cava is normal in size (>=1.5cm, <=2.5cm)  with normal respiratory variability consistent with right  atrial pressure 5-10mm Hg.  7. Normal right ventricular size and function.

## 2019-07-11 NOTE — DIETITIAN INITIAL EVALUATION ADULT. - ADD RECOMMEND
1. Recommend continue current therapeutic diet modification for symptom management 2. Provide/review nutrition education as indicated 3. Will continue to monitor nutrient intake, wt, labs, f/u per protocol

## 2019-07-11 NOTE — DIETITIAN INITIAL EVALUATION ADULT. - PHYSICAL APPEARANCE
well nourished/other (specify) Height: inches, Weight: 151 pounds (7/10)  BMI: 23.6 kg/m2 IBW: 135 pounds (+/-10%), %IBW: 112%  Pertinent Info: 1+ edema to b/l ankles noted, no pressure injuries noted at this time in nursing flow sheet.

## 2019-07-11 NOTE — PROGRESS NOTE ADULT - SUBJECTIVE AND OBJECTIVE BOX
Patient is a 78y old  Female who presents with a chief complaint of chest pain/pressure (11 Jul 2019 08:57)      SUBJECTIVE / OVERNIGHT EVENTS: No overnight events. S/p cath 2 LAD stents with impella assisted  Feels well, denies cp, sob, abd pain, n/v. No bleeding anywhere. RLE access site wnl, no bleeding or swelling reported    Tele reviewed: NSR 1st degree AV block, 70-90    MEDICATIONS  (STANDING):  aspirin enteric coated 81 milliGRAM(s) Oral daily  atorvastatin 40 milliGRAM(s) Oral at bedtime  clopidogrel Tablet 75 milliGRAM(s) Oral daily  furosemide   Injectable 40 milliGRAM(s) IV Push daily  heparin  Infusion.  Unit(s)/Hr (11 mL/Hr) IV Continuous <Continuous>  metoprolol succinate ER 50 milliGRAM(s) Oral daily  sacubitril 24 mG/valsartan 26 mG 1 Tablet(s) Oral two times a day  spironolactone 12.5 milliGRAM(s) Oral daily  warfarin 5 milliGRAM(s) Oral once    MEDICATIONS  (PRN):  heparin  Injectable 5000 Unit(s) IV Push every 6 hours PRN For aPTT less than 40  heparin  Injectable 2500 Unit(s) IV Push every 6 hours PRN For aPTT between 40 - 57      Vital Signs Last 24 Hrs  T(C): 36.8 (11 Jul 2019 04:38), Max: 36.8 (11 Jul 2019 04:38)  T(F): 98.3 (11 Jul 2019 04:38), Max: 98.3 (11 Jul 2019 04:38)  HR: 82 (11 Jul 2019 04:38) (81 - 98)  BP: 106/67 (11 Jul 2019 04:38) (106/67 - 158/98)  BP(mean): --  RR: 18 (11 Jul 2019 04:38) (16 - 18)  SpO2: 95% (11 Jul 2019 04:38) (94% - 98%)  CAPILLARY BLOOD GLUCOSE        I&O's Summary    10 Jul 2019 07:01  -  11 Jul 2019 07:00  --------------------------------------------------------  IN: 768 mL / OUT: 3300 mL / NET: -2532 mL      PHYSICAL EXAM:  GENERAL: NAD, well-developed  HEAD:  Atraumatic, Normocephalic  NECK: Supple, No JVD  CHEST/LUNG: Clear to auscultation bilaterally; No wheeze  HEART: Regular rate and rhythm;   ABDOMEN: Soft, Nontender, Nondistended; Bowel sounds present  EXTREMITIES:  2+ Peripheral Pulses, trace ankle edema b/l. R groin access site wnl,   PSYCH: AAOx3  NEUROLOGY: non-focal    LABS:                        12.6   9.62  )-----------( 322      ( 11 Jul 2019 08:19 )             38.8     07-11    136  |  98  |  21  ----------------------------<  118<H>  4.2   |  23  |  1.01    Ca    9.4      11 Jul 2019 06:54  Phos  4.6     07-10  Mg     2.3     07-11      PTT - ( 11 Jul 2019 08:24 )  PTT:53.2 sec          RADIOLOGY & ADDITIONAL TESTS:    Imaging Personally Reviewed:    Consultant(s) Notes Reviewed:  ALL    Care Discussed with Consultants/Other Providers: cardio Dr Adamson

## 2019-07-11 NOTE — PROGRESS NOTE ADULT - PROBLEM SELECTOR PLAN 1
-s/p Impella assisted cath s/p REBECCA x 2 to LAD, no issues, site looks well  -discussed with interventional cards, clear from there standpoint for d/c  -c/w ASA/plavix, statin, metoprolol 50mg ER daily  -monitor on tele

## 2019-07-11 NOTE — DIETITIAN INITIAL EVALUATION ADULT. - OTHER INFO
Pt is 78yoF with no significant PMH, admitted with NSTEMI and new onset acute systolic HF and PE. s/p LHC showing multivessel disease, pt declining CABG, now s/p PCI x 2 to LAD. Being bridged to coumadin at this time. +incidental findings of lymphadenopathy, mediastinal-plan for outpatient follow up.    Pt reports good po intake in-patient, good appetite. Pt has no c/o nausea, vomiting, diarrhea, or constipation. Pt denies chewing/swallowing difficulties.  Pt reports her UBW is 150 lbs, most recent wt obtained noted as 151 lbs (7/10), with earlier weights obtained noted as 158 lbs (7/8) and 168.6 lbs (7/4) and pt noted with fluid shifts. Pt unable to quantify how much weight she gained secondary to fluid retention PTA.    Therapeutic Diet PTA: none other than pt would use a salt substitute when cooking at home. Pt with nutrition related questions regarding dietary recommendations and amenable to nutrition education at this time, see below.    Nutrition Supplements PTA: Pt endorses taking a multivitamin, CoQ10, and vitamin C supplement PTA. NKFA reported.

## 2019-07-11 NOTE — PROGRESS NOTE ADULT - ATTENDING COMMENTS
she is doing very well.  No hematoma or bruit or thrill s/p impella assisted pci
Patient assigned to me by night hospitalist in charge for management and care for patient for this evening only. Care to be resumed by day hospitalist in the morning and thereafter.

## 2019-07-11 NOTE — DIETITIAN INITIAL EVALUATION ADULT. - RD TO REMAIN AVAILABLE
yes/Discussed the following with pt: low sodium recommendations, processed foods and take out foods high in sodium to avoid/substitute, fluids in moderation as determined by MD, foods/beverages that count towards fluid restriction, lean protein sources, limiting saturated fat intake, adequate vegetable/whole grain intake, avoiding concentrated sweets, importance of monitoring weight regularly; also discussed coumadin and vitamin K interaction, point system, and importance of consistent vitamin K intake. Written materials provided.

## 2019-07-11 NOTE — PROGRESS NOTE ADULT - PROBLEM SELECTOR PLAN 4
CT at [facility] to have nonspecific mediastinal and right hilar lymphadenopathy. Lymphadenopathy may possibly be related to malignancy.  -Given her pulmonary emboli, patient will require evaluation for malignancy  -pt needs CT A/P w/ contrast, given ACE/ARB, multiple contrast loads, will do CT abd/pelvis as outpatient or next 1-2 days   -She reports she had normal pap smears in the past. She has never had a colonoscopy or mammogram.   -informed her and son she needs to get age appropriate cancer screening as outpatient post discharge

## 2019-07-11 NOTE — PROGRESS NOTE ADULT - PROBLEM SELECTOR PLAN 2
TTE EF 25%, severe global LVSD, grade III diastolic dysfunction, moderately reduced RVSF, mild-mod TR, mild-mod AR, mild AS, and mod pHTN  -mildly hypervolemic but improving  -c/w lasix 40 IV daily, transition to orals soon, f/u cards  - c/w entresto 24/26mg, spironolactone 12.5mg daily  - c/w toprol 50mg daily  - monitor I/O, daily weights

## 2019-07-11 NOTE — PROGRESS NOTE ADULT - ASSESSMENT
78y F w/ no prior PMH initially presenting with chest tightness/pressure and DOEx2 days found to have NSTEMI, new onset acute systolic HF, pulmonary emboli, incidentally found on CTPA to have lymphadenopathy, now s/p LHC with REBECCA x 2 now being bridged from heparin to coumadin.

## 2019-07-12 ENCOUNTER — TRANSCRIPTION ENCOUNTER (OUTPATIENT)
Age: 79
End: 2019-07-12

## 2019-07-12 LAB
ANION GAP SERPL CALC-SCNC: 15 MMOL/L — SIGNIFICANT CHANGE UP (ref 5–17)
APTT BLD: 64.5 SEC — HIGH (ref 27.5–36.3)
APTT BLD: 97.5 SEC — HIGH (ref 27.5–36.3)
BUN SERPL-MCNC: 27 MG/DL — HIGH (ref 7–23)
CALCIUM SERPL-MCNC: 8.7 MG/DL — SIGNIFICANT CHANGE UP (ref 8.4–10.5)
CHLORIDE SERPL-SCNC: 94 MMOL/L — LOW (ref 96–108)
CO2 SERPL-SCNC: 22 MMOL/L — SIGNIFICANT CHANGE UP (ref 22–31)
CREAT SERPL-MCNC: 1.09 MG/DL — SIGNIFICANT CHANGE UP (ref 0.5–1.3)
GLUCOSE SERPL-MCNC: 129 MG/DL — HIGH (ref 70–99)
HCT VFR BLD CALC: 37.6 % — SIGNIFICANT CHANGE UP (ref 34.5–45)
HGB BLD-MCNC: 12.3 G/DL — SIGNIFICANT CHANGE UP (ref 11.5–15.5)
INR BLD: 1.13 RATIO — SIGNIFICANT CHANGE UP (ref 0.88–1.16)
MCHC RBC-ENTMCNC: 31.5 PG — SIGNIFICANT CHANGE UP (ref 27–34)
MCHC RBC-ENTMCNC: 32.7 GM/DL — SIGNIFICANT CHANGE UP (ref 32–36)
MCV RBC AUTO: 96.2 FL — SIGNIFICANT CHANGE UP (ref 80–100)
PLATELET # BLD AUTO: 296 K/UL — SIGNIFICANT CHANGE UP (ref 150–400)
POTASSIUM SERPL-MCNC: 3.7 MMOL/L — SIGNIFICANT CHANGE UP (ref 3.5–5.3)
POTASSIUM SERPL-SCNC: 3.7 MMOL/L — SIGNIFICANT CHANGE UP (ref 3.5–5.3)
PROTHROM AB SERPL-ACNC: 13.1 SEC — SIGNIFICANT CHANGE UP (ref 10–13.1)
RBC # BLD: 3.91 M/UL — SIGNIFICANT CHANGE UP (ref 3.8–5.2)
RBC # FLD: 13.4 % — SIGNIFICANT CHANGE UP (ref 10.3–14.5)
SODIUM SERPL-SCNC: 131 MMOL/L — LOW (ref 135–145)
WBC # BLD: 10.13 K/UL — SIGNIFICANT CHANGE UP (ref 3.8–10.5)
WBC # FLD AUTO: 10.13 K/UL — SIGNIFICANT CHANGE UP (ref 3.8–10.5)

## 2019-07-12 PROCEDURE — 99233 SBSQ HOSP IP/OBS HIGH 50: CPT

## 2019-07-12 RX ORDER — FUROSEMIDE 40 MG
40 TABLET ORAL DAILY
Refills: 0 | Status: DISCONTINUED | OUTPATIENT
Start: 2019-07-13 | End: 2019-07-16

## 2019-07-12 RX ORDER — PANTOPRAZOLE SODIUM 20 MG/1
40 TABLET, DELAYED RELEASE ORAL
Refills: 0 | Status: DISCONTINUED | OUTPATIENT
Start: 2019-07-12 | End: 2019-07-16

## 2019-07-12 RX ORDER — WARFARIN SODIUM 2.5 MG/1
5 TABLET ORAL ONCE
Refills: 0 | Status: COMPLETED | OUTPATIENT
Start: 2019-07-12 | End: 2019-07-12

## 2019-07-12 RX ADMIN — ATORVASTATIN CALCIUM 40 MILLIGRAM(S): 80 TABLET, FILM COATED ORAL at 21:05

## 2019-07-12 RX ADMIN — CLOPIDOGREL BISULFATE 75 MILLIGRAM(S): 75 TABLET, FILM COATED ORAL at 11:11

## 2019-07-12 RX ADMIN — Medication 81 MILLIGRAM(S): at 11:11

## 2019-07-12 RX ADMIN — SACUBITRIL AND VALSARTAN 1 TABLET(S): 24; 26 TABLET, FILM COATED ORAL at 05:23

## 2019-07-12 RX ADMIN — WARFARIN SODIUM 5 MILLIGRAM(S): 2.5 TABLET ORAL at 21:05

## 2019-07-12 RX ADMIN — Medication 50 MILLIGRAM(S): at 05:24

## 2019-07-12 RX ADMIN — Medication 40 MILLIGRAM(S): at 05:27

## 2019-07-12 RX ADMIN — HEPARIN SODIUM 1100 UNIT(S)/HR: 5000 INJECTION INTRAVENOUS; SUBCUTANEOUS at 06:04

## 2019-07-12 RX ADMIN — SPIRONOLACTONE 12.5 MILLIGRAM(S): 25 TABLET, FILM COATED ORAL at 05:23

## 2019-07-12 RX ADMIN — SACUBITRIL AND VALSARTAN 1 TABLET(S): 24; 26 TABLET, FILM COATED ORAL at 16:44

## 2019-07-12 NOTE — PROGRESS NOTE ADULT - PROBLEM SELECTOR PLAN 4
CT at [facility] to have nonspecific mediastinal and right hilar lymphadenopathy. Lymphadenopathy may possibly be related to malignancy.  -Given her pulmonary emboli, patient will require evaluation for malignancy  -pt needs CT A/P w/ contrast, offered to patient but she is refusing further imaging studies as inpatient and wants to do it as outpatient  -She reports she had normal pap smears in the past. She has never had a colonoscopy or mammogram.   -informed her and son she needs to get age appropriate cancer screening as outpatient post discharge

## 2019-07-12 NOTE — PROVIDER CONTACT NOTE (OTHER) - ASSESSMENT
VSS 89/56 HR78 SpO2 96% on RA.  No distress noted.  Denies any chest discomfort.  No s/s of SOB noted.

## 2019-07-12 NOTE — PROVIDER CONTACT NOTE (OTHER) - ASSESSMENT
Patient A & O x 4.  Explained the importance of taking Lipitor. Patient stating  one of the relative had very bad side effects from Lipitor. would like to discuss with  doctor about it. explained the risks and benefits.

## 2019-07-12 NOTE — DISCHARGE NOTE NURSING/CASE MANAGEMENT/SOCIAL WORK - NSDCDPATPORTLINK_GEN_ALL_CORE
You can access the [facility] Portal, offered by [facility], by registering with the following website: [email or web address]

## 2019-07-12 NOTE — PROGRESS NOTE ADULT - PROBLEM SELECTOR PLAN 3
CTA showed PE involving b/l upper lobes, R middle and lower lobe segmental pulmonary arteries. b/l LE dopplers negative for DVT. HD stable, on RA  - c/w heparin ggt to coumadin bridge, monitor PTT/INR  - LE Duplex negative for DVT  - Malignancy workup as below CTA showed PE involving b/l upper lobes, R middle and lower lobe segmental pulmonary arteries. b/l LE dopplers negative for DVT. HD stable, on RA  - c/w heparin ggt to coumadin bridge, monitor PTT/INR  - wants to f/u with Dr Broussard from [place], made appt on 7/17 at 2:30pm with his partner Dr De  - CRISTIAN Duplex negative for DVT  - Malignancy workup as below

## 2019-07-12 NOTE — PROGRESS NOTE ADULT - PROBLEM SELECTOR PLAN 2
TTE EF 25%, severe global LVSD, grade III diastolic dysfunction, moderately reduced RVSF, mild-mod TR, mild-mod AR, mild AS, and mod pHTN  - appears near euvolemic with uptrending bun and drop in NA  - switch to po lasix 40mg daily starting roseanne  - c/w entresto 24/26mg, spironolactone 12.5mg daily  - c/w toprol 50mg daily  - monitor I/O, daily weights

## 2019-07-12 NOTE — PROGRESS NOTE ADULT - PROBLEM SELECTOR PLAN 1
-s/p Impella assisted cath s/p REBECCA x 2 to LAD, no issues, site looks well  -discussed with interventional cards, clear from there standpoint for d/c  -c/w ASA/plavix, statin, metoprolol 50mg ER daily  -monitor on tele -s/p Impella assisted cath s/p REBECCA x 2 to LAD, no cp,  -discussed with interventional cards, cleared from there standpoint for d/c  -c/w ASA/plavix, metoprolol 50mg ER daily, Lipitor (refused but educated her)   -monitor on tele

## 2019-07-12 NOTE — PROGRESS NOTE ADULT - SUBJECTIVE AND OBJECTIVE BOX
Patient is a 78y old  Female who presents with a chief complaint of chest pain/pressure (11 Jul 2019 08:57)      SUBJECTIVE / OVERNIGHT EVENTS: No overnight events. Denies cp, sob, palpitations, n/v, lightheadedness No bleeding or groin pain, swelling.    Tele reviewed: sinus 80-90s, 6 beats WCT this aM    MEDICATIONS  (STANDING):  aspirin enteric coated 81 milliGRAM(s) Oral daily  atorvastatin 40 milliGRAM(s) Oral at bedtime  clopidogrel Tablet 75 milliGRAM(s) Oral daily  heparin  Infusion.  Unit(s)/Hr (11 mL/Hr) IV Continuous <Continuous>  metoprolol succinate ER 50 milliGRAM(s) Oral daily  pantoprazole    Tablet 40 milliGRAM(s) Oral before breakfast  sacubitril 24 mG/valsartan 26 mG 1 Tablet(s) Oral two times a day  spironolactone 12.5 milliGRAM(s) Oral daily  warfarin 5 milliGRAM(s) Oral once    MEDICATIONS  (PRN):  heparin  Injectable 5000 Unit(s) IV Push every 6 hours PRN For aPTT less than 40  heparin  Injectable 2500 Unit(s) IV Push every 6 hours PRN For aPTT between 40 - 57      Vital Signs Last 24 Hrs  T(C): 36.4 (12 Jul 2019 11:49), Max: 36.9 (11 Jul 2019 20:10)  T(F): 97.6 (12 Jul 2019 11:49), Max: 98.5 (11 Jul 2019 20:10)  HR: 85 (12 Jul 2019 11:49) (78 - 93)  BP: 108/70 (12 Jul 2019 11:49) (89/56 - 108/70)  BP(mean): --  RR: 18 (12 Jul 2019 11:49) (18 - 18)  SpO2: 96% (12 Jul 2019 11:49) (95% - 99%)  CAPILLARY BLOOD GLUCOSE        I&O's Summary    11 Jul 2019 07:01  -  12 Jul 2019 07:00  --------------------------------------------------------  IN: 840 mL / OUT: 2300 mL / NET: -1460 mL    12 Jul 2019 07:01  -  12 Jul 2019 12:42  --------------------------------------------------------  IN: 372 mL / OUT: 0 mL / NET: 372 mL        PHYSICAL EXAM:  GENERAL: NAD, well-developed  HEAD:  Atraumatic, Normocephalic  NECK: Supple, No JVD  CHEST/LUNG: crackles at R base only (unchanged from previous), clear otherwise  HEART: Regular rate and rhythm;   ABDOMEN: Soft, Nontender, Nondistended; Bowel sounds present  EXTREMITIES:  2+ Peripheral Pulses, trace ankle edema b/l. R groin access site wnl,   PSYCH: AAOx3  NEUROLOGY: non-focal    LABS:                        12.3   10.13 )-----------( 296      ( 12 Jul 2019 09:27 )             37.6     07-12    131<L>  |  94<L>  |  27<H>  ----------------------------<  129<H>  3.7   |  22  |  1.09    Ca    8.7      12 Jul 2019 06:07  Mg     2.3     07-11      PT/INR - ( 12 Jul 2019 08:44 )   PT: 13.1 sec;   INR: 1.13 ratio         PTT - ( 12 Jul 2019 08:44 )  PTT:64.5 sec              RADIOLOGY & ADDITIONAL TESTS:    Imaging Personally Reviewed:    Consultant(s) Notes Reviewed:  all    Care Discussed with Consultants/Other Providers:

## 2019-07-13 LAB
ANION GAP SERPL CALC-SCNC: 14 MMOL/L — SIGNIFICANT CHANGE UP (ref 5–17)
APTT BLD: 136.3 SEC — CRITICAL HIGH (ref 27.5–36.3)
APTT BLD: 41.8 SEC — HIGH (ref 27.5–36.3)
BUN SERPL-MCNC: 25 MG/DL — HIGH (ref 7–23)
CALCIUM SERPL-MCNC: 9.3 MG/DL — SIGNIFICANT CHANGE UP (ref 8.4–10.5)
CHLORIDE SERPL-SCNC: 100 MMOL/L — SIGNIFICANT CHANGE UP (ref 96–108)
CO2 SERPL-SCNC: 20 MMOL/L — LOW (ref 22–31)
CREAT SERPL-MCNC: 0.97 MG/DL — SIGNIFICANT CHANGE UP (ref 0.5–1.3)
GLUCOSE SERPL-MCNC: 126 MG/DL — HIGH (ref 70–99)
HCT VFR BLD CALC: 36.9 % — SIGNIFICANT CHANGE UP (ref 34.5–45)
HGB BLD-MCNC: 12.1 G/DL — SIGNIFICANT CHANGE UP (ref 11.5–15.5)
INR BLD: 1.16 RATIO — SIGNIFICANT CHANGE UP (ref 0.88–1.16)
MCHC RBC-ENTMCNC: 31.7 PG — SIGNIFICANT CHANGE UP (ref 27–34)
MCHC RBC-ENTMCNC: 32.8 GM/DL — SIGNIFICANT CHANGE UP (ref 32–36)
MCV RBC AUTO: 96.6 FL — SIGNIFICANT CHANGE UP (ref 80–100)
PLATELET # BLD AUTO: 290 K/UL — SIGNIFICANT CHANGE UP (ref 150–400)
POTASSIUM SERPL-MCNC: 3.9 MMOL/L — SIGNIFICANT CHANGE UP (ref 3.5–5.3)
POTASSIUM SERPL-SCNC: 3.9 MMOL/L — SIGNIFICANT CHANGE UP (ref 3.5–5.3)
PROTHROM AB SERPL-ACNC: 13.3 SEC — HIGH (ref 10–13.1)
RBC # BLD: 3.82 M/UL — SIGNIFICANT CHANGE UP (ref 3.8–5.2)
RBC # FLD: 13.4 % — SIGNIFICANT CHANGE UP (ref 10.3–14.5)
SODIUM SERPL-SCNC: 134 MMOL/L — LOW (ref 135–145)
WBC # BLD: 9.06 K/UL — SIGNIFICANT CHANGE UP (ref 3.8–10.5)
WBC # FLD AUTO: 9.06 K/UL — SIGNIFICANT CHANGE UP (ref 3.8–10.5)

## 2019-07-13 PROCEDURE — 99233 SBSQ HOSP IP/OBS HIGH 50: CPT

## 2019-07-13 RX ORDER — WARFARIN SODIUM 2.5 MG/1
5 TABLET ORAL ONCE
Refills: 0 | Status: COMPLETED | OUTPATIENT
Start: 2019-07-13 | End: 2019-07-13

## 2019-07-13 RX ADMIN — HEPARIN SODIUM 0 UNIT(S)/HR: 5000 INJECTION INTRAVENOUS; SUBCUTANEOUS at 20:52

## 2019-07-13 RX ADMIN — PANTOPRAZOLE SODIUM 40 MILLIGRAM(S): 20 TABLET, DELAYED RELEASE ORAL at 05:16

## 2019-07-13 RX ADMIN — SACUBITRIL AND VALSARTAN 1 TABLET(S): 24; 26 TABLET, FILM COATED ORAL at 17:32

## 2019-07-13 RX ADMIN — WARFARIN SODIUM 5 MILLIGRAM(S): 2.5 TABLET ORAL at 21:05

## 2019-07-13 RX ADMIN — Medication 81 MILLIGRAM(S): at 11:46

## 2019-07-13 RX ADMIN — CLOPIDOGREL BISULFATE 75 MILLIGRAM(S): 75 TABLET, FILM COATED ORAL at 11:46

## 2019-07-13 RX ADMIN — SPIRONOLACTONE 12.5 MILLIGRAM(S): 25 TABLET, FILM COATED ORAL at 05:15

## 2019-07-13 RX ADMIN — HEPARIN SODIUM 2500 UNIT(S): 5000 INJECTION INTRAVENOUS; SUBCUTANEOUS at 12:15

## 2019-07-13 RX ADMIN — Medication 40 MILLIGRAM(S): at 05:16

## 2019-07-13 RX ADMIN — ATORVASTATIN CALCIUM 40 MILLIGRAM(S): 80 TABLET, FILM COATED ORAL at 21:05

## 2019-07-13 RX ADMIN — SACUBITRIL AND VALSARTAN 1 TABLET(S): 24; 26 TABLET, FILM COATED ORAL at 05:15

## 2019-07-13 RX ADMIN — HEPARIN SODIUM 1200 UNIT(S)/HR: 5000 INJECTION INTRAVENOUS; SUBCUTANEOUS at 12:14

## 2019-07-13 RX ADMIN — Medication 50 MILLIGRAM(S): at 05:16

## 2019-07-13 NOTE — PROGRESS NOTE ADULT - SUBJECTIVE AND OBJECTIVE BOX
Patient is a 78y old  Female who presents with a chief complaint of chest pain/pressure (11 Jul 2019 08:57)      SUBJECTIVE / OVERNIGHT EVENTS:    MEDICATIONS  (STANDING):  aspirin enteric coated 81 milliGRAM(s) Oral daily  atorvastatin 40 milliGRAM(s) Oral at bedtime  clopidogrel Tablet 75 milliGRAM(s) Oral daily  furosemide    Tablet 40 milliGRAM(s) Oral daily  heparin  Infusion.  Unit(s)/Hr (11 mL/Hr) IV Continuous <Continuous>  metoprolol succinate ER 50 milliGRAM(s) Oral daily  pantoprazole    Tablet 40 milliGRAM(s) Oral before breakfast  sacubitril 24 mG/valsartan 26 mG 1 Tablet(s) Oral two times a day  spironolactone 12.5 milliGRAM(s) Oral daily    MEDICATIONS  (PRN):  heparin  Injectable 5000 Unit(s) IV Push every 6 hours PRN For aPTT less than 40  heparin  Injectable 2500 Unit(s) IV Push every 6 hours PRN For aPTT between 40 - 57      Vital Signs Last 24 Hrs  T(C): 36.5 (13 Jul 2019 04:04), Max: 36.8 (12 Jul 2019 20:06)  T(F): 97.7 (13 Jul 2019 04:04), Max: 98.2 (12 Jul 2019 20:06)  HR: 92 (13 Jul 2019 04:04) (78 - 92)  BP: 122/80 (13 Jul 2019 04:04) (89/56 - 130/76)  BP(mean): --  RR: 18 (13 Jul 2019 04:04) (18 - 18)  SpO2: 97% (13 Jul 2019 04:04) (94% - 97%)  CAPILLARY BLOOD GLUCOSE        I&O's Summary    12 Jul 2019 07:01  -  13 Jul 2019 07:00  --------------------------------------------------------  IN: 1224 mL / OUT: 0 mL / NET: 1224 mL        PHYSICAL EXAM:  GENERAL: NAD, well-developed  HEAD:  Atraumatic, Normocephalic  EYES: EOMI, PERRLA, conjunctiva and sclera clear  NECK: Supple, No JVD  CHEST/LUNG: Clear to auscultation bilaterally; No wheeze  HEART: Regular rate and rhythm; No murmurs, rubs, or gallops  ABDOMEN: Soft, Nontender, Nondistended; Bowel sounds present  EXTREMITIES:  2+ Peripheral Pulses, No clubbing, cyanosis, or edema  PSYCH: AAOx3  NEUROLOGY: non-focal  SKIN: No rashes or lesions    LABS:                        12.3   10.13 )-----------( 296      ( 12 Jul 2019 09:27 )             37.6     07-13    134<L>  |  100  |  25<H>  ----------------------------<  126<H>  3.9   |  20<L>  |  0.97    Ca    9.3      13 Jul 2019 06:35      PT/INR - ( 12 Jul 2019 08:44 )   PT: 13.1 sec;   INR: 1.13 ratio         PTT - ( 12 Jul 2019 08:44 )  PTT:64.5 sec          RADIOLOGY & ADDITIONAL TESTS:    Imaging Personally Reviewed:    Consultant(s) Notes Reviewed:      Care Discussed with Consultants/Other Providers: Patient is a 78y old  Female who presents with a chief complaint of chest pain/pressure (11 Jul 2019 08:57)      SUBJECTIVE / OVERNIGHT EVENTS:  Pt seen and examined. No acute events overnight. She denies cp, sob, LE swelling.     MEDICATIONS  (STANDING):  aspirin enteric coated 81 milliGRAM(s) Oral daily  atorvastatin 40 milliGRAM(s) Oral at bedtime  clopidogrel Tablet 75 milliGRAM(s) Oral daily  furosemide    Tablet 40 milliGRAM(s) Oral daily  heparin  Infusion.  Unit(s)/Hr (11 mL/Hr) IV Continuous <Continuous>  metoprolol succinate ER 50 milliGRAM(s) Oral daily  pantoprazole    Tablet 40 milliGRAM(s) Oral before breakfast  sacubitril 24 mG/valsartan 26 mG 1 Tablet(s) Oral two times a day  spironolactone 12.5 milliGRAM(s) Oral daily    MEDICATIONS  (PRN):  heparin  Injectable 5000 Unit(s) IV Push every 6 hours PRN For aPTT less than 40  heparin  Injectable 2500 Unit(s) IV Push every 6 hours PRN For aPTT between 40 - 57      Vital Signs Last 24 Hrs  T(C): 36.5 (13 Jul 2019 04:04), Max: 36.8 (12 Jul 2019 20:06)  T(F): 97.7 (13 Jul 2019 04:04), Max: 98.2 (12 Jul 2019 20:06)  HR: 92 (13 Jul 2019 04:04) (78 - 92)  BP: 122/80 (13 Jul 2019 04:04) (89/56 - 130/76)  BP(mean): --  RR: 18 (13 Jul 2019 04:04) (18 - 18)  SpO2: 97% (13 Jul 2019 04:04) (94% - 97%)  CAPILLARY BLOOD GLUCOSE        I&O's Summary    12 Jul 2019 07:01  -  13 Jul 2019 07:00  --------------------------------------------------------  IN: 1224 mL / OUT: 0 mL / NET: 1224 mL        PHYSICAL EXAM:  GENERAL: NAD, anicteric, afebrile  HEAD:  Atraumatic, Normocephalic  EYES: EOMI, PERRLA, conjunctiva and sclera clear  NECK: Supple, No JVD  CHEST/LUNG: Clear to auscultation bilaterally; No wheeze  HEART: Regular rate and rhythm; No murmurs, rubs, or gallops  ABDOMEN: Soft, Nontender, Nondistended; Bowel sounds present  EXTREMITIES:  2+ Peripheral Pulses, No clubbing, cyanosis, or edema  PSYCH: AAOx3  NEUROLOGY: non-focal  SKIN: No rashes or lesions    LABS:                        12.3   10.13 )-----------( 296      ( 12 Jul 2019 09:27 )             37.6     07-13    134<L>  |  100  |  25<H>  ----------------------------<  126<H>  3.9   |  20<L>  |  0.97    Ca    9.3      13 Jul 2019 06:35      PT/INR - ( 12 Jul 2019 08:44 )   PT: 13.1 sec;   INR: 1.13 ratio         PTT - ( 12 Jul 2019 08:44 )  PTT:64.5 sec                Care Discussed with Consultants/Other Providers: Cardiology

## 2019-07-13 NOTE — PROGRESS NOTE ADULT - PROBLEM SELECTOR PLAN 2
TTE EF 25%, severe global LVSD, grade III diastolic dysfunction, moderately reduced RVSF, mild-mod TR, mild-mod AR, mild AS, and mod pHTN  - appears euvolemic   - switched to po lasix 40mg daily this AM  - c/w entresto 24/26mg, spironolactone 12.5mg daily  - c/w toprol 50mg daily  - monitor I/O, daily weights

## 2019-07-13 NOTE — PROGRESS NOTE ADULT - PROBLEM SELECTOR PLAN 1
-s/p Impella assisted cath s/p REBECCA x 2 to LAD, no cp,  -c/w ASA/Plavix, metoprolol 50mg ER daily, Lipitor   -monitor on tele

## 2019-07-13 NOTE — PROGRESS NOTE ADULT - PROBLEM SELECTOR PLAN 4
CT at [facility] demonstrated nonspecific mediastinal and right hilar lymphadenopathy. Lymphadenopathy may possibly be related to malignancy.  -Given her pulmonary emboli, patient will require evaluation for malignancy  -pt needs CT A/P w/ contrast ;she declines further imaging studies as inpatient and wants to do it as outpatient  -She reports she had normal pap smears in the past. She has never had a colonoscopy or mammogram.   -Pt and son informed that she needs to get age appropriate cancer screening as outpatient post discharge

## 2019-07-14 LAB
ANION GAP SERPL CALC-SCNC: 13 MMOL/L — SIGNIFICANT CHANGE UP (ref 5–17)
APTT BLD: 82 SEC — HIGH (ref 27.5–36.3)
APTT BLD: 82.4 SEC — HIGH (ref 27.5–36.3)
APTT BLD: 91.8 SEC — HIGH (ref 27.5–36.3)
BUN SERPL-MCNC: 21 MG/DL — SIGNIFICANT CHANGE UP (ref 7–23)
CALCIUM SERPL-MCNC: 9.3 MG/DL — SIGNIFICANT CHANGE UP (ref 8.4–10.5)
CHLORIDE SERPL-SCNC: 98 MMOL/L — SIGNIFICANT CHANGE UP (ref 96–108)
CO2 SERPL-SCNC: 22 MMOL/L — SIGNIFICANT CHANGE UP (ref 22–31)
CREAT SERPL-MCNC: 1.01 MG/DL — SIGNIFICANT CHANGE UP (ref 0.5–1.3)
GLUCOSE SERPL-MCNC: 116 MG/DL — HIGH (ref 70–99)
HCT VFR BLD CALC: 37.8 % — SIGNIFICANT CHANGE UP (ref 34.5–45)
HCT VFR BLD CALC: 39.1 % — SIGNIFICANT CHANGE UP (ref 34.5–45)
HGB BLD-MCNC: 12.1 G/DL — SIGNIFICANT CHANGE UP (ref 11.5–15.5)
HGB BLD-MCNC: 13.3 G/DL — SIGNIFICANT CHANGE UP (ref 11.5–15.5)
INR BLD: 1.23 RATIO — HIGH (ref 0.88–1.16)
MCHC RBC-ENTMCNC: 31.5 PG — SIGNIFICANT CHANGE UP (ref 27–34)
MCHC RBC-ENTMCNC: 32 GM/DL — SIGNIFICANT CHANGE UP (ref 32–36)
MCHC RBC-ENTMCNC: 33.4 PG — SIGNIFICANT CHANGE UP (ref 27–34)
MCHC RBC-ENTMCNC: 34.1 GM/DL — SIGNIFICANT CHANGE UP (ref 32–36)
MCV RBC AUTO: 97.7 FL — SIGNIFICANT CHANGE UP (ref 80–100)
MCV RBC AUTO: 98.4 FL — SIGNIFICANT CHANGE UP (ref 80–100)
PLATELET # BLD AUTO: 261 K/UL — SIGNIFICANT CHANGE UP (ref 150–400)
PLATELET # BLD AUTO: 270 K/UL — SIGNIFICANT CHANGE UP (ref 150–400)
POTASSIUM SERPL-MCNC: 4.2 MMOL/L — SIGNIFICANT CHANGE UP (ref 3.5–5.3)
POTASSIUM SERPL-SCNC: 4.2 MMOL/L — SIGNIFICANT CHANGE UP (ref 3.5–5.3)
PROTHROM AB SERPL-ACNC: 14.1 SEC — HIGH (ref 10–12.9)
RBC # BLD: 3.84 M/UL — SIGNIFICANT CHANGE UP (ref 3.8–5.2)
RBC # BLD: 4 M/UL — SIGNIFICANT CHANGE UP (ref 3.8–5.2)
RBC # FLD: 12.4 % — SIGNIFICANT CHANGE UP (ref 10.3–14.5)
RBC # FLD: 13.5 % — SIGNIFICANT CHANGE UP (ref 10.3–14.5)
SODIUM SERPL-SCNC: 133 MMOL/L — LOW (ref 135–145)
WBC # BLD: 7.45 K/UL — SIGNIFICANT CHANGE UP (ref 3.8–10.5)
WBC # BLD: 9.6 K/UL — SIGNIFICANT CHANGE UP (ref 3.8–10.5)
WBC # FLD AUTO: 7.45 K/UL — SIGNIFICANT CHANGE UP (ref 3.8–10.5)
WBC # FLD AUTO: 9.6 K/UL — SIGNIFICANT CHANGE UP (ref 3.8–10.5)

## 2019-07-14 PROCEDURE — 99233 SBSQ HOSP IP/OBS HIGH 50: CPT

## 2019-07-14 RX ORDER — HEPARIN SODIUM 5000 [USP'U]/ML
5000 INJECTION INTRAVENOUS; SUBCUTANEOUS EVERY 6 HOURS
Refills: 0 | Status: DISCONTINUED | OUTPATIENT
Start: 2019-07-14 | End: 2019-07-16

## 2019-07-14 RX ORDER — WARFARIN SODIUM 2.5 MG/1
8 TABLET ORAL ONCE
Refills: 0 | Status: COMPLETED | OUTPATIENT
Start: 2019-07-14 | End: 2019-07-14

## 2019-07-14 RX ORDER — HEPARIN SODIUM 5000 [USP'U]/ML
1000 INJECTION INTRAVENOUS; SUBCUTANEOUS
Qty: 25000 | Refills: 0 | Status: DISCONTINUED | OUTPATIENT
Start: 2019-07-14 | End: 2019-07-16

## 2019-07-14 RX ORDER — HEPARIN SODIUM 5000 [USP'U]/ML
2500 INJECTION INTRAVENOUS; SUBCUTANEOUS EVERY 6 HOURS
Refills: 0 | Status: DISCONTINUED | OUTPATIENT
Start: 2019-07-14 | End: 2019-07-16

## 2019-07-14 RX ADMIN — ATORVASTATIN CALCIUM 40 MILLIGRAM(S): 80 TABLET, FILM COATED ORAL at 21:09

## 2019-07-14 RX ADMIN — Medication 81 MILLIGRAM(S): at 11:12

## 2019-07-14 RX ADMIN — CLOPIDOGREL BISULFATE 75 MILLIGRAM(S): 75 TABLET, FILM COATED ORAL at 11:12

## 2019-07-14 RX ADMIN — HEPARIN SODIUM 1000 UNIT(S)/HR: 5000 INJECTION INTRAVENOUS; SUBCUTANEOUS at 08:58

## 2019-07-14 RX ADMIN — SACUBITRIL AND VALSARTAN 1 TABLET(S): 24; 26 TABLET, FILM COATED ORAL at 05:42

## 2019-07-14 RX ADMIN — HEPARIN SODIUM 0 UNIT(S)/HR: 5000 INJECTION INTRAVENOUS; SUBCUTANEOUS at 08:09

## 2019-07-14 RX ADMIN — SACUBITRIL AND VALSARTAN 1 TABLET(S): 24; 26 TABLET, FILM COATED ORAL at 17:06

## 2019-07-14 RX ADMIN — Medication 40 MILLIGRAM(S): at 05:42

## 2019-07-14 RX ADMIN — SPIRONOLACTONE 12.5 MILLIGRAM(S): 25 TABLET, FILM COATED ORAL at 05:42

## 2019-07-14 RX ADMIN — WARFARIN SODIUM 8 MILLIGRAM(S): 2.5 TABLET ORAL at 21:09

## 2019-07-14 RX ADMIN — HEPARIN SODIUM 1000 UNIT(S)/HR: 5000 INJECTION INTRAVENOUS; SUBCUTANEOUS at 23:42

## 2019-07-14 RX ADMIN — HEPARIN SODIUM 1000 UNIT(S)/HR: 5000 INJECTION INTRAVENOUS; SUBCUTANEOUS at 17:07

## 2019-07-14 RX ADMIN — PANTOPRAZOLE SODIUM 40 MILLIGRAM(S): 20 TABLET, DELAYED RELEASE ORAL at 07:58

## 2019-07-14 RX ADMIN — Medication 50 MILLIGRAM(S): at 05:42

## 2019-07-14 NOTE — PROVIDER CONTACT NOTE (OTHER) - ASSESSMENT
Patient asleep in bed. Easily awoken. No complaint of chest pain, SOB, lightheadedness/dizziness. VSS on RA.

## 2019-07-14 NOTE — PROGRESS NOTE ADULT - PROBLEM SELECTOR PLAN 2
TTE EF 25%, severe global LVSD, grade III diastolic dysfunction, moderately reduced RVSF, mild-mod TR, mild-mod AR, mild AS, and mod pHTN  - appears euvolemic   - c/w entresto 24/26mg, spironolactone 12.5mg daily, po lasix 40mg daily  - c/w toprol 50mg daily  - monitor I/O, daily weights

## 2019-07-14 NOTE — PROVIDER CONTACT NOTE (OTHER) - SITUATION
Pat had 5.65 sec of PAT, HR up to 164. Pat had 5.65 sec of PAT, HR up to 164. Event happened at 5:50 AM notified by tele tech at this time.

## 2019-07-14 NOTE — PROGRESS NOTE ADULT - SUBJECTIVE AND OBJECTIVE BOX
Patient is a 78y old  Female who presents with a chief complaint of chest pain/pressure (11 Jul 2019 08:57)      SUBJECTIVE / OVERNIGHT EVENTS: No overnight events.  Feels well, denies bleeding, cp, sob, leg swelling. wants to go home    tele reviewed: sinus 70-80, brief PAT    MEDICATIONS  (STANDING):  aspirin enteric coated 81 milliGRAM(s) Oral daily  atorvastatin 40 milliGRAM(s) Oral at bedtime  clopidogrel Tablet 75 milliGRAM(s) Oral daily  furosemide    Tablet 40 milliGRAM(s) Oral daily  heparin  Infusion. 1000 Unit(s)/Hr (10 mL/Hr) IV Continuous <Continuous>  metoprolol succinate ER 50 milliGRAM(s) Oral daily  pantoprazole    Tablet 40 milliGRAM(s) Oral before breakfast  sacubitril 24 mG/valsartan 26 mG 1 Tablet(s) Oral two times a day  spironolactone 12.5 milliGRAM(s) Oral daily    MEDICATIONS  (PRN):  heparin  Injectable 5000 Unit(s) IV Push every 6 hours PRN For aPTT less than 40  heparin  Injectable 2500 Unit(s) IV Push every 6 hours PRN For aPTT between 40 - 57      Vital Signs Last 24 Hrs  T(C): 36.6 (14 Jul 2019 04:49), Max: 36.8 (13 Jul 2019 20:19)  T(F): 97.8 (14 Jul 2019 04:49), Max: 98.3 (13 Jul 2019 20:19)  HR: 75 (14 Jul 2019 04:49) (75 - 88)  BP: 114/74 (14 Jul 2019 04:49) (105/64 - 120/75)  BP(mean): --  RR: 16 (14 Jul 2019 04:49) (16 - 18)  SpO2: 97% (14 Jul 2019 04:49) (96% - 98%)  CAPILLARY BLOOD GLUCOSE        I&O's Summary    13 Jul 2019 07:01  -  14 Jul 2019 07:00  --------------------------------------------------------  IN: 1080 mL / OUT: 2050 mL / NET: -970 mL        PHYSICAL EXAM:  GENERAL: NAD, well-developed  HEAD:  Atraumatic, Normocephalic  NECK: Supple, No JVD  CHEST/LUNG: Clear to auscultation bilaterally; No wheeze  HEART: Regular rate and rhythm; No murmurs, rubs, or gallops  ABDOMEN: Soft, Nontender, Nondistended; Bowel sounds present  EXTREMITIES:  2+ Peripheral Pulses, No clubbing, cyanosis, or edema  PSYCH: AAOx3  NEUROLOGY: non-focal  SKIN: No rashes or lesions    LABS:                        12.1   9.06  )-----------( 290      ( 13 Jul 2019 11:23 )             36.9     07-14    133<L>  |  98  |  21  ----------------------------<  116<H>  4.2   |  22  |  1.01    Ca    9.3      14 Jul 2019 06:45      PT/INR - ( 14 Jul 2019 06:41 )   PT: 14.1 sec;   INR: 1.23 ratio         PTT - ( 14 Jul 2019 06:41 )  PTT:82.4 sec              RADIOLOGY & ADDITIONAL TESTS:    Imaging Personally Reviewed:    Consultant(s) Notes Reviewed:  all    Care Discussed with Consultants/Other Providers:

## 2019-07-14 NOTE — PROGRESS NOTE ADULT - PROBLEM SELECTOR PLAN 3
CTA showed PE involving b/l upper lobes, R middle and lower lobe segmental pulmonary arteries.   b/l LE dopplers negative for DVT.   HD stable, on RA  - c/w heparin ggt to coumadin bridge  - monitor PTT/INR ; INR 1.2, will give coumadin 10mg dose tonight  - wants to f/u with Dr Broussard from [place], has appt on 7/17 at 2:30pm with his partner Dr De  - pt wants to get malignancy workup as outpatient CTA showed PE involving b/l upper lobes, R middle and lower lobe segmental pulmonary arteries.   b/l LE dopplers negative for DVT.   HD stable, on RA  - c/w heparin ggt to coumadin bridge  - monitor PTT/INR ; INR 1.2, will give coumadin 8mg dose tonight  - wants to f/u with Dr Broussard from [place], has appt on 7/17 at 2:30pm with his partner Dr De  - pt wants to get malignancy workup as outpatient

## 2019-07-14 NOTE — PROGRESS NOTE ADULT - PROBLEM SELECTOR PLAN 4
CT at [facility] demonstrated nonspecific mediastinal and right hilar lymphadenopathy. Lymphadenopathy may possibly be related to malignancy.  -Given her pulmonary emboli, patient will require evaluation for malignancy  -pt needs CT A/P w/ contrast ;she declines further imaging studies as inpatient and wants to do it as outpatient  -She reports she had normal pap smears in the past. She has never had a colonoscopy or mammogram.   -Pt and son informed that she needs to get age appropriate cancer screening as outpatient post discharge

## 2019-07-15 LAB
ANION GAP SERPL CALC-SCNC: 16 MMOL/L — SIGNIFICANT CHANGE UP (ref 5–17)
APTT BLD: 40.6 SEC — HIGH (ref 27.5–36.3)
BUN SERPL-MCNC: 23 MG/DL — SIGNIFICANT CHANGE UP (ref 7–23)
CALCIUM SERPL-MCNC: 9.2 MG/DL — SIGNIFICANT CHANGE UP (ref 8.4–10.5)
CHLORIDE SERPL-SCNC: 96 MMOL/L — SIGNIFICANT CHANGE UP (ref 96–108)
CO2 SERPL-SCNC: 22 MMOL/L — SIGNIFICANT CHANGE UP (ref 22–31)
CREAT SERPL-MCNC: 0.98 MG/DL — SIGNIFICANT CHANGE UP (ref 0.5–1.3)
GLUCOSE SERPL-MCNC: 116 MG/DL — HIGH (ref 70–99)
HCT VFR BLD CALC: 38.4 % — SIGNIFICANT CHANGE UP (ref 34.5–45)
HGB BLD-MCNC: 12.5 G/DL — SIGNIFICANT CHANGE UP (ref 11.5–15.5)
INR BLD: 1.48 RATIO — HIGH (ref 0.88–1.16)
INR BLD: 1.92 RATIO — HIGH (ref 0.88–1.16)
MCHC RBC-ENTMCNC: 31.7 PG — SIGNIFICANT CHANGE UP (ref 27–34)
MCHC RBC-ENTMCNC: 32.6 GM/DL — SIGNIFICANT CHANGE UP (ref 32–36)
MCV RBC AUTO: 97.5 FL — SIGNIFICANT CHANGE UP (ref 80–100)
PLATELET # BLD AUTO: 254 K/UL — SIGNIFICANT CHANGE UP (ref 150–400)
POTASSIUM SERPL-MCNC: 4 MMOL/L — SIGNIFICANT CHANGE UP (ref 3.5–5.3)
POTASSIUM SERPL-SCNC: 4 MMOL/L — SIGNIFICANT CHANGE UP (ref 3.5–5.3)
PROTHROM AB SERPL-ACNC: 17.1 SEC — HIGH (ref 10–13.1)
PROTHROM AB SERPL-ACNC: 22.3 SEC — HIGH (ref 10–12.9)
RBC # BLD: 3.94 M/UL — SIGNIFICANT CHANGE UP (ref 3.8–5.2)
RBC # FLD: 13.5 % — SIGNIFICANT CHANGE UP (ref 10.3–14.5)
SODIUM SERPL-SCNC: 134 MMOL/L — LOW (ref 135–145)
WBC # BLD: 6.65 K/UL — SIGNIFICANT CHANGE UP (ref 3.8–10.5)
WBC # FLD AUTO: 6.65 K/UL — SIGNIFICANT CHANGE UP (ref 3.8–10.5)

## 2019-07-15 PROCEDURE — 99233 SBSQ HOSP IP/OBS HIGH 50: CPT

## 2019-07-15 RX ORDER — WARFARIN SODIUM 2.5 MG/1
5 TABLET ORAL ONCE
Refills: 0 | Status: COMPLETED | OUTPATIENT
Start: 2019-07-15 | End: 2019-07-15

## 2019-07-15 RX ADMIN — HEPARIN SODIUM 1100 UNIT(S)/HR: 5000 INJECTION INTRAVENOUS; SUBCUTANEOUS at 19:10

## 2019-07-15 RX ADMIN — Medication 40 MILLIGRAM(S): at 05:15

## 2019-07-15 RX ADMIN — PANTOPRAZOLE SODIUM 40 MILLIGRAM(S): 20 TABLET, DELAYED RELEASE ORAL at 05:16

## 2019-07-15 RX ADMIN — SACUBITRIL AND VALSARTAN 1 TABLET(S): 24; 26 TABLET, FILM COATED ORAL at 05:15

## 2019-07-15 RX ADMIN — SPIRONOLACTONE 12.5 MILLIGRAM(S): 25 TABLET, FILM COATED ORAL at 05:15

## 2019-07-15 RX ADMIN — Medication 81 MILLIGRAM(S): at 11:14

## 2019-07-15 RX ADMIN — CLOPIDOGREL BISULFATE 75 MILLIGRAM(S): 75 TABLET, FILM COATED ORAL at 11:09

## 2019-07-15 RX ADMIN — Medication 50 MILLIGRAM(S): at 05:15

## 2019-07-15 RX ADMIN — ATORVASTATIN CALCIUM 40 MILLIGRAM(S): 80 TABLET, FILM COATED ORAL at 21:03

## 2019-07-15 RX ADMIN — HEPARIN SODIUM 2500 UNIT(S): 5000 INJECTION INTRAVENOUS; SUBCUTANEOUS at 19:10

## 2019-07-15 RX ADMIN — WARFARIN SODIUM 5 MILLIGRAM(S): 2.5 TABLET ORAL at 21:03

## 2019-07-15 RX ADMIN — SACUBITRIL AND VALSARTAN 1 TABLET(S): 24; 26 TABLET, FILM COATED ORAL at 18:14

## 2019-07-15 NOTE — PROGRESS NOTE ADULT - PROBLEM SELECTOR PLAN 2
TTE EF 25%, severe global LVSD, grade III diastolic dysfunction, moderately reduced RVSF, mild-mod TR, mild-mod AR, mild AS, and mod pHTN. euvolemic   - c/w entresto 24/26mg, spironolactone 12.5mg daily, po lasix 40mg daily  - c/w toprol 50mg daily  - monitor I/O, daily weights

## 2019-07-15 NOTE — PROGRESS NOTE ADULT - SUBJECTIVE AND OBJECTIVE BOX
Patient is a 78y old  Female who presents with a chief complaint of chest pain/pressure (11 Jul 2019 08:57)      SUBJECTIVE / OVERNIGHT EVENTS: No overnight events. Feels excellent. Denies bleeding anywhere. no cp, sob, leg swelling. wants to go home    Tele revieweD: sinus , pvcs    MEDICATIONS  (STANDING):  aspirin enteric coated 81 milliGRAM(s) Oral daily  atorvastatin 40 milliGRAM(s) Oral at bedtime  clopidogrel Tablet 75 milliGRAM(s) Oral daily  furosemide    Tablet 40 milliGRAM(s) Oral daily  heparin  Infusion. 1000 Unit(s)/Hr (10 mL/Hr) IV Continuous <Continuous>  metoprolol succinate ER 50 milliGRAM(s) Oral daily  pantoprazole    Tablet 40 milliGRAM(s) Oral before breakfast  sacubitril 24 mG/valsartan 26 mG 1 Tablet(s) Oral two times a day  spironolactone 12.5 milliGRAM(s) Oral daily    MEDICATIONS  (PRN):  heparin  Injectable 5000 Unit(s) IV Push every 6 hours PRN For aPTT less than 40  heparin  Injectable 2500 Unit(s) IV Push every 6 hours PRN For aPTT between 40 - 57      Vital Signs Last 24 Hrs  T(C): 36.6 (15 Jul 2019 11:51), Max: 37 (14 Jul 2019 12:22)  T(F): 97.9 (15 Jul 2019 11:51), Max: 98.6 (14 Jul 2019 12:22)  HR: 89 (15 Jul 2019 11:51) (85 - 95)  BP: 106/65 (15 Jul 2019 11:51) (106/65 - 121/77)  BP(mean): --  RR: 18 (15 Jul 2019 11:51) (18 - 18)  SpO2: 96% (15 Jul 2019 11:51) (95% - 98%)  CAPILLARY BLOOD GLUCOSE        I&O's Summary    14 Jul 2019 07:01  -  15 Jul 2019 07:00  --------------------------------------------------------  IN: 1200 mL / OUT: 1150 mL / NET: 50 mL        PHYSICAL EXAM:  GENERAL: NAD, well-developed  HEAD:  Atraumatic, Normocephalic  NECK: Supple, No JVD  CHEST/LUNG: Clear to auscultation bilaterally; No wheeze  HEART: Regular rate and rhythm;   ABDOMEN: Soft, Nontender, Nondistended; Bowel sounds present  EXTREMITIES:  2+ Peripheral Pulses, No clubbing, cyanosis, or edema  PSYCH: AAOx3      LABS:                        12.5   6.65  )-----------( 254      ( 15 Jul 2019 08:28 )             38.4     07-15    134<L>  |  96  |  23  ----------------------------<  116<H>  4.0   |  22  |  0.98    Ca    9.2      15 Jul 2019 06:33      PT/INR - ( 15 Jul 2019 09:37 )   PT: 17.1 sec;   INR: 1.48 ratio         PTT - ( 15 Jul 2019 09:37 )  PTT:40.6 sec              RADIOLOGY & ADDITIONAL TESTS:    Imaging Personally Reviewed:    Consultant(s) Notes Reviewed:  all    Care Discussed with Consultants/Other Providers: dolly Adamson

## 2019-07-15 NOTE — PROGRESS NOTE ADULT - PROBLEM SELECTOR PLAN 3
CTA showed PE involving b/l upper lobes, R middle and lower lobe segmental pulmonary arteries.   b/l LE dopplers negative for DVT.   HD stable, on RA  - c/w heparin ggt to coumadin bridge  - monitor PTT/INR ; INR 1.4, repeat INR in PM, base coumadin dosing on that  - wants to f/u with Dr Broussard from [place], has appt on 7/17 at 2:30pm with his partner Dr De  - pt wants to get malignancy workup as outpatient, refusing inpatient CT

## 2019-07-15 NOTE — PROGRESS NOTE ADULT - PROBLEM SELECTOR PLAN 4
CT at [facility] demonstrated nonspecific mediastinal and right hilar lymphadenopathy. Lymphadenopathy may possibly be related to malignancy.  -Given her pulmonary emboli, patient will require evaluation for malignancy  -pt needs CT A/P w/ contrast ;she declines further imaging studies as inpatient and wants to do it as outpatient  -She reports she had normal pap smears in the past. She has never had a colonoscopy or mammogram.   -Pt and son informed that she needs to get age appropriate cancer screening as outpatient post discharge

## 2019-07-16 ENCOUNTER — TRANSCRIPTION ENCOUNTER (OUTPATIENT)
Age: 79
End: 2019-07-16

## 2019-07-16 VITALS
HEART RATE: 94 BPM | TEMPERATURE: 98 F | SYSTOLIC BLOOD PRESSURE: 118 MMHG | RESPIRATION RATE: 18 BRPM | DIASTOLIC BLOOD PRESSURE: 76 MMHG | OXYGEN SATURATION: 96 %

## 2019-07-16 PROBLEM — I26.99 OTHER PULMONARY EMBOLISM WITHOUT ACUTE COR PULMONALE: Chronic | Status: ACTIVE | Noted: 2019-07-05

## 2019-07-16 PROBLEM — I21.4 NON-ST ELEVATION (NSTEMI) MYOCARDIAL INFARCTION: Chronic | Status: ACTIVE | Noted: 2019-07-05

## 2019-07-16 PROBLEM — Z00.00 ENCOUNTER FOR PREVENTIVE HEALTH EXAMINATION: Status: ACTIVE | Noted: 2019-07-16

## 2019-07-16 PROBLEM — I50.20 UNSPECIFIED SYSTOLIC (CONGESTIVE) HEART FAILURE: Chronic | Status: ACTIVE | Noted: 2019-07-05

## 2019-07-16 LAB
ANION GAP SERPL CALC-SCNC: 17 MMOL/L — SIGNIFICANT CHANGE UP (ref 5–17)
APTT BLD: 196.5 SEC — CRITICAL HIGH (ref 27.5–36.3)
APTT BLD: 67.2 SEC — HIGH (ref 27.5–36.3)
BUN SERPL-MCNC: 21 MG/DL — SIGNIFICANT CHANGE UP (ref 7–23)
CALCIUM SERPL-MCNC: 9.2 MG/DL — SIGNIFICANT CHANGE UP (ref 8.4–10.5)
CHLORIDE SERPL-SCNC: 97 MMOL/L — SIGNIFICANT CHANGE UP (ref 96–108)
CO2 SERPL-SCNC: 21 MMOL/L — LOW (ref 22–31)
CREAT SERPL-MCNC: 1.03 MG/DL — SIGNIFICANT CHANGE UP (ref 0.5–1.3)
GLUCOSE SERPL-MCNC: 120 MG/DL — HIGH (ref 70–99)
INR BLD: 1.97 RATIO — HIGH (ref 0.88–1.16)
INR BLD: 2.16 RATIO — HIGH (ref 0.88–1.16)
POTASSIUM SERPL-MCNC: 4.2 MMOL/L — SIGNIFICANT CHANGE UP (ref 3.5–5.3)
POTASSIUM SERPL-SCNC: 4.2 MMOL/L — SIGNIFICANT CHANGE UP (ref 3.5–5.3)
PROTHROM AB SERPL-ACNC: 22.7 SEC — HIGH (ref 10–13.1)
PROTHROM AB SERPL-ACNC: 25.4 SEC — HIGH (ref 10–12.9)
SODIUM SERPL-SCNC: 135 MMOL/L — SIGNIFICANT CHANGE UP (ref 135–145)

## 2019-07-16 PROCEDURE — 99239 HOSP IP/OBS DSCHRG MGMT >30: CPT

## 2019-07-16 RX ORDER — CLOPIDOGREL BISULFATE 75 MG/1
1 TABLET, FILM COATED ORAL
Qty: 30 | Refills: 0
Start: 2019-07-16 | End: 2019-08-14

## 2019-07-16 RX ORDER — ATORVASTATIN CALCIUM 80 MG/1
1 TABLET, FILM COATED ORAL
Qty: 39 | Refills: 0
Start: 2019-07-16 | End: 2019-08-23

## 2019-07-16 RX ORDER — SACUBITRIL AND VALSARTAN 24; 26 MG/1; MG/1
1 TABLET, FILM COATED ORAL
Qty: 60 | Refills: 0
Start: 2019-07-16 | End: 2019-08-14

## 2019-07-16 RX ORDER — LISINOPRIL 2.5 MG/1
1 TABLET ORAL
Qty: 30 | Refills: 0
Start: 2019-07-16 | End: 2019-08-14

## 2019-07-16 RX ORDER — ASPIRIN/CALCIUM CARB/MAGNESIUM 324 MG
1 TABLET ORAL
Qty: 30 | Refills: 0
Start: 2019-07-16 | End: 2019-08-14

## 2019-07-16 RX ORDER — WARFARIN SODIUM 2.5 MG/1
1 TABLET ORAL
Qty: 7 | Refills: 0
Start: 2019-07-16 | End: 2019-07-22

## 2019-07-16 RX ORDER — WARFARIN SODIUM 2.5 MG/1
5 TABLET ORAL ONCE
Refills: 0 | Status: DISCONTINUED | OUTPATIENT
Start: 2019-07-16 | End: 2019-07-16

## 2019-07-16 RX ORDER — FUROSEMIDE 40 MG
1 TABLET ORAL
Qty: 30 | Refills: 0
Start: 2019-07-16 | End: 2019-08-14

## 2019-07-16 RX ORDER — SPIRONOLACTONE 25 MG/1
0.5 TABLET, FILM COATED ORAL
Qty: 15 | Refills: 0
Start: 2019-07-16 | End: 2019-08-14

## 2019-07-16 RX ORDER — METOPROLOL TARTRATE 50 MG
1 TABLET ORAL
Qty: 30 | Refills: 0
Start: 2019-07-16 | End: 2019-08-14

## 2019-07-16 RX ORDER — POTASSIUM CHLORIDE 20 MEQ
1 PACKET (EA) ORAL
Qty: 30 | Refills: 0
Start: 2019-07-16 | End: 2019-08-14

## 2019-07-16 RX ADMIN — Medication 50 MILLIGRAM(S): at 05:30

## 2019-07-16 RX ADMIN — HEPARIN SODIUM 900 UNIT(S)/HR: 5000 INJECTION INTRAVENOUS; SUBCUTANEOUS at 14:35

## 2019-07-16 RX ADMIN — Medication 40 MILLIGRAM(S): at 05:30

## 2019-07-16 RX ADMIN — HEPARIN SODIUM 0 UNIT(S)/HR: 5000 INJECTION INTRAVENOUS; SUBCUTANEOUS at 01:46

## 2019-07-16 RX ADMIN — HEPARIN SODIUM 900 UNIT(S)/HR: 5000 INJECTION INTRAVENOUS; SUBCUTANEOUS at 02:47

## 2019-07-16 RX ADMIN — SPIRONOLACTONE 12.5 MILLIGRAM(S): 25 TABLET, FILM COATED ORAL at 05:30

## 2019-07-16 RX ADMIN — Medication 81 MILLIGRAM(S): at 12:16

## 2019-07-16 RX ADMIN — CLOPIDOGREL BISULFATE 75 MILLIGRAM(S): 75 TABLET, FILM COATED ORAL at 12:16

## 2019-07-16 RX ADMIN — PANTOPRAZOLE SODIUM 40 MILLIGRAM(S): 20 TABLET, DELAYED RELEASE ORAL at 05:30

## 2019-07-16 RX ADMIN — SACUBITRIL AND VALSARTAN 1 TABLET(S): 24; 26 TABLET, FILM COATED ORAL at 05:30

## 2019-07-16 NOTE — DISCHARGE NOTE PROVIDER - NSDCFUADDAPPT_GEN_ALL_CORE_FT
followup with Dr Urbina tomorrow 7/17 at 2:30pm for your appointment and INR/coumadin check    you MUST GET YOUR A MALIGNANCY WORKUP AS OUTPATIENT EITHER WITH [facility] CLIN[facility] OR ANY PRIMARY CARE OF YOUR CHOICE. THIS INCLUDES BUT NOT LIMITED TO CAT SCAN OF YOUR ABDOMEN AND PELVIS, PAP-SMEAR, MAMMOGRAM, AND COLONOSCOPY followup with Dr Urbina tomorrow 7/17 at 2:30pm for your appointment and INR/coumadin check. Take coumadin 5mg tonight    you MUST GET YOUR A MALIGNANCY WORKUP AS OUTPATIENT EITHER WITH [facility] CLIN[facility] OR ANY PRIMARY CARE OF YOUR CHOICE. THIS INCLUDES BUT NOT LIMITED TO CAT SCAN OF YOUR ABDOMEN AND PELVIS, PAP-SMEAR, MAMMOGRAM, AND COLONOSCOPY

## 2019-07-16 NOTE — PROVIDER CONTACT NOTE (CRITICAL VALUE NOTIFICATION) - ACTION/TREATMENT ORDERED:
LEAH Pal notified- will repeat troponin- awaiting orders.
NP notified.Ordered to follow heparin nomogram.Delayed heparin gtt for 60 minutes for now and will be restarting heparin gtt @9ml/hr as pernomogram.Will continue to monitor.
Provider made aware. Heparin infusion adjusted based on nomogram (hold for 60 min, lower rate from 12ml/hr to 10ml/hr). Will continue to monitor and maintain safety.
advised to continue the normogram

## 2019-07-16 NOTE — PROGRESS NOTE ADULT - PROBLEM SELECTOR PLAN 1
-s/p Impella assisted cath s/p REBECCA x 2 to LAD, no cp,  -c/w ASA/Plavix, metoprolol 50mg ER daily, Lipitor   -outpatient f/u with Dr Kal cronni at 2:30PM,

## 2019-07-16 NOTE — DISCHARGE NOTE PROVIDER - CARE PROVIDERS DIRECT ADDRESSES
,[email or web address]

## 2019-07-16 NOTE — PROGRESS NOTE ADULT - PROBLEM SELECTOR PLAN 4
CT at [facility] demonstrated nonspecific mediastinal and right hilar lymphadenopathy. Lymphadenopathy may possibly be related to malignancy.  -Given her pulmonary emboli, patient will require evaluation for malignancy  -pt needs CT A/P w/ contrast ;she declines further imaging studies as inpatient and wants to do it as outpatient  -She reports she had normal pap smears in the past. She has never had a colonoscopy or mammogram.   -Pt and son informed that she needs to get age appropriate cancer screening as outpatient post discharge  -medicine clinic followup

## 2019-07-16 NOTE — PROGRESS NOTE ADULT - PROBLEM SELECTOR PLAN 5
DVT ppx: heparin/coumadin bridge for PE  Dispo: pending therapeutic INR
DVT ppx: on hep gtt for PE  Diet: DASH
DVT ppx: on hep gtt for PE  Diet: DASH
DVT ppx: on hep gtt for PE  Dispo: pending therapeutic INR
DVT ppx: coumadin  Dispo: Discharge home today with outpatient INR check and cards appointment with Dr Shivam Florence who I discussed case on phone and gave full signout, also need medicine clinic or PCP followup to for malignancy workup  spent 65 min on discharge process time
DVT ppx: heparin/coumadin bridge for PE  Dispo: pending therapeutic INR
DVT ppx: heparin/coumadin bridge for PE  Dispo: pending therapeutic INR
DVT ppx: on hep gtt for PE  Diet: DASH
DVT ppx: on hep gtt for PE  Diet: DASH
DVT ppx: on hep gtt for PE  Dispo: medically active
DVT ppx: heparin/coumadin bridge for PE  Dispo: pending therapeutic INR
DVT ppx: on hep gtt for PE  Diet: DASH

## 2019-07-16 NOTE — PROGRESS NOTE ADULT - PROBLEM SELECTOR PLAN 3
CTA showed PE involving b/l upper lobes, R middle and lower lobe segmental pulmonary arteries.   b/l LE dopplers negative for DVT.   HD stable, on RA, no symptoms  - INR 1.97, discussed with cards DR Adamson and Dr Quick, ok for discharge today with coumadin 6mg tonight, INR check roseanne with Dr Bertrand (Dr Galdamez partner)  - pt wants to get malignancy workup as outpatient, refusing inpatient CT CTA showed PE involving b/l upper lobes, R middle and lower lobe segmental pulmonary arteries.   b/l LE dopplers negative for DVT.   HD stable, on RA, no symptoms  - INR 2.1, discussed with cards DR Adamson and Dr Quick, ok for discharge today with coumadin 5mg tonight, INR check roseanne with Dr Bertrand (Dr Galdamez partner)  - pt wants to get malignancy workup as outpatient, refusing inpatient CT

## 2019-07-16 NOTE — CHART NOTE - NSCHARTNOTEFT_GEN_A_CORE
Pt seen for verbal nutrition consult and follow up, pt requesting review of coumadin nutrition education provided on 7/11.    Reviewed the following with pt: coumadin and vitamin K drug interaction, importance of consistent vitamin K intake daily, how to use point system noted in booklet provided, sample weekly plan for vitamin K intake, examples of high vitamin K foods. Pt with written materials at bedside, reviewed at this time. Pt nutrition related questions answered at this time and pt made aware RD remains available.    Marcela Buchanan RD, CDN Pager: [phone]

## 2019-07-16 NOTE — DISCHARGE NOTE PROVIDER - PROVIDER TOKENS
PROVIDER:[TOKEN:[[id number]],FOLLOWUP:[1 week]] PROVIDER:[TOKEN:[[id number]],FOLLOWUP:[1-3 days]],FREE:[LAST:[primary care [facility]],PHONE:[phone]],FAX:[(   [phone]    -],ADDRESS:[[address] [place] [address] [place] [address]],FOLLOWUP:[1 week]]

## 2019-07-16 NOTE — PROGRESS NOTE ADULT - PROBLEM SELECTOR PROBLEM 1
NSTEMI (non-ST elevated myocardial infarction)
Coronary artery disease
NSTEMI (non-ST elevated myocardial infarction)

## 2019-07-16 NOTE — PROGRESS NOTE ADULT - PROVIDER SPECIALTY LIST ADULT
Cardiology
Cardiology
Hospitalist
Internal Medicine
Intervent Cardiology
CT Surgery
Hospitalist

## 2019-07-16 NOTE — DISCHARGE NOTE PROVIDER - HOSPITAL COURSE
78y F w/ no prior PMH initially presenting with chest tightness/pressure and DOEx2 days found to have NSTEMI, new onset acute systolic HF, pulmonary emboli, incidentally found on CTPA to have lymphadenopathy, now s/p LHC with REBECCA x 2 now being bridged from heparin to coumadin.              NSTEMI (non-ST elevated myocardial infarction).  Plan: -s/p Impella assisted cath s/p REBECCA x 2 to LAD, no cp,    -c/w ASA/Plavix, metoprolol 50mg ER daily, Lipitor         Acute systolic heart failure.  Plan: TTE EF 25%, severe global LVSD, grade III diastolic dysfunction, moderately reduced RVSF, mild-mod TR, mild-mod AR, mild AS, and mod pHTN. euvolemic     - c/w entresto 24/26mg, spironolactone 12.5mg daily, po lasix 40mg daily    - c/w toprol 50mg daily        Pulmonary embolism.  Plan: CTA showed PE involving b/l upper lobes, R middle and lower lobe segmental pulmonary arteries.     b/l LE dopplers negative for DVT.     HD stable, on RA    - c/w heparin ggt to coumadin bridge    - monitor PTT/INR ; INR 1.4, repeat INR in PM, base coumadin dosing on that    - wants to f/u with Dr Broussard from [place], has appt on 7/17 at 2:30pm with his partner Dr De    - pt wants to get malignancy workup as outpatient, refusing inpatient CT.             Lymphadenopathy, mediastinal.  Plan: CT at [facility] demonstrated nonspecific mediastinal and right hilar lymphadenopathy. Lymphadenopathy may possibly be related to malignancy.    -Given her pulmonary emboli, patient will require evaluation for malignancy    -pt needs CT A/P w/ contrast ;she declines further imaging studies as inpatient and wants to do it as outpatient    -She reports she had normal pap smears in the past. She has never had a colonoscopy or mammogram.     -Pt and son informed that she needs to get age appropriate cancer screening as outpatient post discharge. 78y F w/ no prior PMH initially presenting with chest tightness/pressure and DOEx2 days found to have NSTEMI, new onset acute systolic HF, pulmonary emboli, incidentally found on CTPA to have lymphadenopathy, now s/p LHC with REBECCA x 2 now being bridged from heparin to coumadin.           Problem/Plan - 1:    ·  Problem: NSTEMI (non-ST elevated myocardial infarction).  Plan: -s/p Impella assisted cath s/p REBECCA x 2 to LAD, no cp,    -c/w ASA/Plavix, metoprolol 50mg ER daily, Lipitor     -outpatient f/u with Dr Kal cronin at 2:30PM,          Problem/Plan - 2:    ·  Problem: Acute systolic heart failure.  Plan: TTE EF 25% at [place], 15-20% at [facility] severe global LVSD, grade III diastolic dysfunction, moderately reduced RVSF, mild-mod TR, mild-mod AR, mild AS, and mod pHTN.     -euvolemic, given low EP consulted and discussed case with EP Dr Almanzar -no data to support life vest, rec against it.    - c/w entresto 24/26mg, spironolactone 12.5mg daily, po lasix 40mg daily    - c/w toprol 50mg daily    - monitor I/O, daily weights.          Problem/Plan - 3:    ·  Problem: Pulmonary embolism.  Plan: CTA showed PE involving b/l upper lobes, R middle and lower lobe segmental pulmonary arteries.     b/l LE dopplers negative for DVT.     HD stable, on RA, no symptoms    - INR 1.97, discussed with cards DR Adamson and Dr Quick, ok for discharge today with coumadin 6mg tonight, INR check roseanne with Dr Bertrand (Dr Galdamez partner)    - pt wants to get malignancy workup as outpatient, refusing inpatient CT.          Problem/Plan - 4:    ·  Problem: Lymphadenopathy, mediastinal.  Plan: CT at [facility] demonstrated nonspecific mediastinal and right hilar lymphadenopathy. Lymphadenopathy may possibly be related to malignancy.    -Given her pulmonary emboli, patient will require evaluation for malignancy    -pt needs CT A/P w/ contrast ;she declines further imaging studies as inpatient and wants to do it as outpatient    -She reports she had normal pap smears in the past. She has never had a colonoscopy or mammogram.     -Pt and son informed that she needs to get age appropriate cancer screening as outpatient post discharge    -medicine clinic followup.          Problem/Plan - 5:    ·  Problem: Prophylactic measure.  Plan: DVT ppx: coumadin    Dispo: Discharge home today with outpatient INR check and cards appointment with Dr Shivam Florence who I discussed case on phone and gave full signout, also need medicine clinic or PCP followup  for malignancy workup    spent 65 min on discharge process time. 78y F w/ no prior PMH initially presenting with chest tightness/pressure and DOEx2 days found to have NSTEMI, new onset acute systolic HF, pulmonary emboli, incidentally found on CTPA to have lymphadenopathy, now s/p LHC with REBECCA x 2 now being bridged from heparin to coumadin.           Problem/Plan - 1:    ·  Problem: NSTEMI (non-ST elevated myocardial infarction).  Plan: -s/p Impella assisted cath s/p REBECCA x 2 to LAD, no cp,    -c/w ASA/Plavix, metoprolol 50mg ER daily, Lipitor     -outpatient f/u with Dr Kal cronin at 2:30PM,          Problem/Plan - 2:    ·  Problem: Acute systolic heart failure.  Plan: TTE EF 25% at [place], 15-20% at [facility] severe global LVSD, grade III diastolic dysfunction, moderately reduced RVSF, mild-mod TR, mild-mod AR, mild AS, and mod pHTN.     -euvolemic, given low EP consulted and discussed case with EP Dr Almanzar -no data to support life vest, rec against it.    -d/c entresto as >$400 copay, discussed with Dr Adamson, switch to lisinopril 5mg starting on 7/18 to allow for at least 36 hour washout period (discussed with patient as well)    -c/w spironolactone 12.5mg daily, lasix 40mg daily    - c/w toprol 50mg daily    - monitor I/O, daily weights.             Problem/Plan - 3:    ·  Problem: Pulmonary embolism.  Plan: CTA showed PE involving b/l upper lobes, R middle and lower lobe segmental pulmonary arteries.     b/l LE dopplers negative for DVT.     HD stable, on RA, no symptoms    - INR 2.1, discussed with cards DR Adamson and Dr Quick, ok for discharge today with coumadin 5mg tonight, INR check roseanne with Dr Bertrand (Dr Galdamez partner)    - pt wants to get malignancy workup as outpatient, refusing inpatient CT.             Problem/Plan - 4:    ·  Problem: Lymphadenopathy, mediastinal.  Plan: CT at [facility] demonstrated nonspecific mediastinal and right hilar lymphadenopathy. Lymphadenopathy may possibly be related to malignancy.    -Given her pulmonary emboli, patient will require evaluation for malignancy    -pt needs CT A/P w/ contrast ;she declines further imaging studies as inpatient and wants to do it as outpatient    -She reports she had normal pap smears in the past. She has never had a colonoscopy or mammogram.     -Pt and son informed that she needs to get age appropriate cancer screening as outpatient post discharge    -medicine clinic followup.          Problem/Plan - 5:    ·  Problem: Prophylactic measure.  Plan: DVT ppx: coumadin    Dispo: Discharge home today with outpatient INR check and cards appointment with Dr Shivam Florence who I discussed case on phone and gave full signout, also need medicine clinic or PCP followup to for malignancy workup    spent 65 min on discharge process time.         Attending Attestation:     Plan discussed with NP Nell and son Diallo Ferrell who fully understands outpatient plans

## 2019-07-16 NOTE — PROGRESS NOTE ADULT - PROBLEM SELECTOR PROBLEM 3
Pulmonary embolism

## 2019-07-16 NOTE — DISCHARGE NOTE PROVIDER - NSDCCPCAREPLAN_GEN_ALL_CORE_FT
PRINCIPAL DISCHARGE DIAGNOSIS  Diagnosis: Non-ST elevation MI (NSTEMI)  Assessment and Plan of Treatment: s/p Impella assisted cath s/p REBECCA x 2 to LAD, no cp,  -c/w ASA/Plavix, metoprolol 50mg ER daily, Lipitor      SECONDARY DISCHARGE DIAGNOSES  Diagnosis: Acute CHF  Assessment and Plan of Treatment: Weigh yourself daily.  If you gain 3lbs in 3 days, or 5lbs in a week call your Health Care Provider.  Do not eat or drink foods containing more than 2000mg of salt (sodium) in your diet every day.  Call your Health Care Provider if you have any swelling or increased swelling in your feet, ankles, and/or stomach.  Take all of your medication as directed.  If you become dizzy call your Health Care Provider.      Diagnosis: Pulmonary embolism  Assessment and Plan of Treatment: Take your anticoagulation as directed.  Follow up with your health care provider within one week. Call for appointment.  If you develop shortness of breath or if your shortness of breath worsens call your Health Care Provider or go to the Emergency Department. PRINCIPAL DISCHARGE DIAGNOSIS  Diagnosis: Non-ST elevation MI (NSTEMI)  Assessment and Plan of Treatment: s/p Impella assisted cath s/p REBECCA x 2 to LAD, no cp,  -c/w ASA/Plavix, metoprolol 50mg ER daily, Lipitor  -followup with cardiologist tomorrow      SECONDARY DISCHARGE DIAGNOSES  Diagnosis: Acute CHF  Assessment and Plan of Treatment: Weigh yourself daily.  If you gain 3lbs in 3 days, or 5lbs in a week call your Health Care Provider.  Do not eat or drink foods containing more than 2000mg of salt (sodium) in your diet every day.  Call your Health Care Provider if you have any swelling or increased swelling in your feet, ankles, and/or stomach.  Take all of your medication as directed.  If you become dizzy call your Health Care Provider.  continue medications      Diagnosis: Lymphadenopathy, mediastinal  Assessment and Plan of Treatment: Your CT of your chest show mediastinal lymphadenopathy  you must get a cancer workup as outpatient with a primary care doctor  you were offered CAT scan of your abdomen and pelvis but refused and wanted to do this as outpatient   you must also get a PAP smear, mammogram, and colonscopy      Diagnosis: Pulmonary embolism  Assessment and Plan of Treatment: take your coumadin tonight and followup with your cardiologist roseanne at 2:30 pm to check your INR  If you develop shortness of breath or if your shortness of breath worsens call your Health Care Provider or go to the Emergency Department.

## 2019-07-16 NOTE — PROGRESS NOTE ADULT - SUBJECTIVE AND OBJECTIVE BOX
Patient is a 78y old  Female who presents with a chief complaint of Chest Pain (16 Jul 2019 13:18)      SUBJECTIVE / OVERNIGHT EVENTS: no overnight events. Feels well, wants to go home. Denies cp, sob, palpitations. no melena, hematochezia, bruising, bleeding.    tele reviewed: sinus , pvcs    MEDICATIONS  (STANDING):  aspirin enteric coated 81 milliGRAM(s) Oral daily  atorvastatin 40 milliGRAM(s) Oral at bedtime  clopidogrel Tablet 75 milliGRAM(s) Oral daily  furosemide    Tablet 40 milliGRAM(s) Oral daily  heparin  Infusion. 1000 Unit(s)/Hr (10 mL/Hr) IV Continuous <Continuous>  metoprolol succinate ER 50 milliGRAM(s) Oral daily  pantoprazole    Tablet 40 milliGRAM(s) Oral before breakfast  sacubitril 24 mG/valsartan 26 mG 1 Tablet(s) Oral two times a day  spironolactone 12.5 milliGRAM(s) Oral daily    MEDICATIONS  (PRN):  heparin  Injectable 5000 Unit(s) IV Push every 6 hours PRN For aPTT less than 40  heparin  Injectable 2500 Unit(s) IV Push every 6 hours PRN For aPTT between 40 - 57      Vital Signs Last 24 Hrs  T(C): 36.5 (16 Jul 2019 11:43), Max: 36.5 (16 Jul 2019 05:07)  T(F): 97.7 (16 Jul 2019 11:43), Max: 97.7 (16 Jul 2019 05:07)  HR: 94 (16 Jul 2019 11:43) (93 - 98)  BP: 118/76 (16 Jul 2019 11:43) (100/63 - 118/76)  BP(mean): --  RR: 18 (16 Jul 2019 11:43) (18 - 18)  SpO2: 96% (16 Jul 2019 11:43) (96% - 97%)  CAPILLARY BLOOD GLUCOSE        I&O's Summary    15 Jul 2019 07:01  -  16 Jul 2019 07:00  --------------------------------------------------------  IN: 1071 mL / OUT: 1450 mL / NET: -379 mL    16 Jul 2019 07:01  -  16 Jul 2019 14:56  --------------------------------------------------------  IN: 720 mL / OUT: 950 mL / NET: -230 mL    PHYSICAL EXAM:  GENERAL: NAD, well-developed  HEAD:  Atraumatic, Normocephalic  NECK: Supple, No JVD  CHEST/LUNG: Clear to auscultation bilaterally; No wheeze  HEART: Regular rate and rhythm;   ABDOMEN: Soft, Nontender, Nondistended; Bowel sounds present  EXTREMITIES:  2+ Peripheral Pulses, No clubbing, cyanosis, or edema  PSYCH: AAOx3    LABS:                        12.5   6.65  )-----------( 254      ( 15 Jul 2019 08:28 )             38.4     07-16    135  |  97  |  21  ----------------------------<  120<H>  4.2   |  21<L>  |  1.03    Ca    9.2      16 Jul 2019 06:48      PT/INR - ( 16 Jul 2019 10:06 )   PT: 22.7 sec;   INR: 1.97 ratio         PTT - ( 16 Jul 2019 13:58 )  PTT:67.2 sec              RADIOLOGY & ADDITIONAL TESTS:    Imaging Personally Reviewed:    Consultant(s) Notes Reviewed:      Care Discussed with Consultants/Other Providers:

## 2019-07-16 NOTE — PROVIDER CONTACT NOTE (CRITICAL VALUE NOTIFICATION) - BACKGROUND
Pt admitted with NSTEMI s/p [facility] with MVD, awaiting staged PCI        	CHF systolic EF 15-20% on IV Lasix, entresto, aldactone,         	Bilateral PE on Heparin gttScattered lung LA, r/o malignancy  	Lymphadenopathy, mediastinal.

## 2019-07-16 NOTE — DISCHARGE NOTE PROVIDER - CARE PROVIDER_API CALL
Kal Stanford)  Cardiology; Internal Medicine  [address], [address]  [place] [address]  Phone: [phone]  Fax: [phone]  Follow Up Time: 1 week Kal Stanford)  Cardiology; Internal Medicine  [address], [address]  [place] [address]  Phone: [phone]  Fax: [phone]  Follow Up Time: 1-3 days    [facility],   [address], [place] [address]  Phone: [phone]  Fax: (   )    -  Follow Up Time: 1 week

## 2019-07-16 NOTE — PROGRESS NOTE ADULT - PROBLEM SELECTOR PLAN 2
TTE EF 25% at [place], 15-20% at [facility] severe global LVSD, grade III diastolic dysfunction, moderately reduced RVSF, mild-mod TR, mild-mod AR, mild AS, and mod pHTN.   -euvolemic, given low EP consulted and discussed case with EP Dr Almanzar -no data to support life vest, rec against it.  - c/w entresto 24/26mg, spironolactone 12.5mg daily, po lasix 40mg daily  - c/w toprol 50mg daily  - monitor I/O, daily weights TTE EF 25% at [place], 15-20% at [facility] severe global LVSD, grade III diastolic dysfunction, moderately reduced RVSF, mild-mod TR, mild-mod AR, mild AS, and mod pHTN.   -euvolemic, given low EP consulted and discussed case with EP Dr Almanzar -no data to support life vest, rec against it.  -d/c entresto as >$400 copay, discussed with Dr Adamson, switch to lisinopril 5mg starting on 7/18 to allow for at least 36 hour washout period (discussed with patient as well)  -c/w spironolactone 12.5mg daily, lasix 40mg daily  - c/w toprol 50mg daily  - monitor I/O, daily weights

## 2019-07-17 ENCOUNTER — NON-APPOINTMENT (OUTPATIENT)
Age: 79
End: 2019-07-17

## 2019-07-17 ENCOUNTER — APPOINTMENT (OUTPATIENT)
Dept: CARDIOLOGY | Facility: CLINIC | Age: 79
End: 2019-07-17
Payer: MEDICARE

## 2019-07-17 VITALS — SYSTOLIC BLOOD PRESSURE: 98 MMHG | HEART RATE: 72 BPM | DIASTOLIC BLOOD PRESSURE: 60 MMHG

## 2019-07-17 VITALS
SYSTOLIC BLOOD PRESSURE: 90 MMHG | OXYGEN SATURATION: 99 % | DIASTOLIC BLOOD PRESSURE: 59 MMHG | HEART RATE: 88 BPM | RESPIRATION RATE: 16 BRPM

## 2019-07-17 DIAGNOSIS — Z78.9 OTHER SPECIFIED HEALTH STATUS: ICD-10-CM

## 2019-07-17 DIAGNOSIS — Z82.49 FAMILY HISTORY OF ISCHEMIC HEART DISEASE AND OTHER DISEASES OF THE CIRCULATORY SYSTEM: ICD-10-CM

## 2019-07-17 DIAGNOSIS — I21.4 NON-ST ELEVATION (NSTEMI) MYOCARDIAL INFARCTION: ICD-10-CM

## 2019-07-17 PROCEDURE — 99215 OFFICE O/P EST HI 40 MIN: CPT

## 2019-07-17 PROCEDURE — 85610 PROTHROMBIN TIME: CPT | Mod: QW

## 2019-07-17 PROCEDURE — 93000 ELECTROCARDIOGRAM COMPLETE: CPT

## 2019-07-18 PROBLEM — Z82.49 FAMILY HISTORY OF ACUTE MYOCARDIAL INFARCTION: Status: ACTIVE | Noted: 2019-07-18

## 2019-07-18 PROBLEM — Z78.9 ALCOHOL INGESTION: Status: ACTIVE | Noted: 2019-07-18

## 2019-07-18 PROBLEM — I21.4 NSTEMI (NON-ST ELEVATED MYOCARDIAL INFARCTION): Status: ACTIVE | Noted: 2019-07-18

## 2019-07-18 LAB
INR PPP: 1.9 RATIO
POCT-PROTHROMBIN TIME: 22.9 SECS
QUALITY CONTROL: YES

## 2019-07-18 NOTE — ASSESSMENT
[FreeTextEntry1] : Impression:\par 1.Coronary artery disease status post non-STEMI and status post stenting\par 2. Probable cardiomyopathy independent of CAD\par 3. Recent pulmonary embolus patient refusing malignancy workup\par \par Plan:\par 1. INR done today is 1.9 so patient will continue on warfarin 5 mg.\par 2. Due to the fact that she is on triple therapy will most probably discontinue either aspirin or Plavix in one month's time\par 3. Patient to check renal profile within the next week\par 4. Return for followup in one week's time\par 5. Indications for medications and disease state discussed with patient and hector who is an RN.

## 2019-07-18 NOTE — PHYSICAL EXAM
[General Appearance - Well Developed] : well developed [Normal Appearance] : normal appearance [Well Groomed] : well groomed [General Appearance - Well Nourished] : well nourished [No Deformities] : no deformities [General Appearance - In No Acute Distress] : no acute distress [Normal Oral Mucosa] : normal oral mucosa [No Oral Pallor] : no oral pallor [No Oral Cyanosis] : no oral cyanosis [Normal Jugular Venous A Waves Present] : normal jugular venous A waves present [Normal Jugular Venous V Waves Present] : normal jugular venous V waves present [No Jugular Venous Newman A Waves] : no jugular venous newman A waves [Normal Rate] : normal [Normal S1] : normal S1 [Normal S2] : normal S2 [S3] : no S3 [S4] : no S4 [No Murmur] : no murmurs heard [Right Carotid Bruit] : no bruit heard over the right carotid [Left Carotid Bruit] : no bruit heard over the left carotid [Right Femoral Bruit] : no bruit heard over the right femoral artery [Left Femoral Bruit] : no bruit heard over the left femoral artery [2+] : left 2+ [No Abnormalities] : the abdominal aorta was not enlarged and no bruit was heard [No Pitting Edema] : no pitting edema present [Respiration, Rhythm And Depth] : normal respiratory rhythm and effort [Exaggerated Use Of Accessory Muscles For Inspiration] : no accessory muscle use [Auscultation Breath Sounds / Voice Sounds] : lungs were clear to auscultation bilaterally [Abdomen Soft] : soft [Abdomen Tenderness] : non-tender [Abdomen Mass (___ Cm)] : no abdominal mass palpated [Abnormal Walk] : normal gait [Gait - Sufficient For Exercise Testing] : the gait was sufficient for exercise testing [Nail Clubbing] : no clubbing of the fingernails [Cyanosis, Localized] : no localized cyanosis [Petechial Hemorrhages (___cm)] : no petechial hemorrhages [Skin Color & Pigmentation] : normal skin color and pigmentation [] : no rash [No Venous Stasis] : no venous stasis [Skin Lesions] : no skin lesions [No Skin Ulcers] : no skin ulcer [No Xanthoma] : no  xanthoma was observed [Oriented To Time, Place, And Person] : oriented to person, place, and time [Affect] : the affect was normal [Mood] : the mood was normal [No Anxiety] : not feeling anxious

## 2019-07-18 NOTE — REASON FOR VISIT
[Follow-Up - From Hospitalization] : follow-up of a recent hospitalization for [Cardiomyopathy] : cardiomyopathy [Coronary Artery Disease] : coronary artery disease [FreeTextEntry1] : This is a 78-year-old woman who was in her usual state of health which she states was very good until July 3 when she suddenly developed severe chest heaviness and presented to [facility]. She was found to have a non-STEMI and was ultimately transferred for cardiac catheterization where she underwent stenting. At this time cathreport is not available. She also was found to have severe left ventricular systolic dysfunction with ejection fraction between 15 and 25%. She also incidentally was found to have pulmonary emboli. The patient was treated medically and released yesterday afternoon. At the time of discharge her INR was 1.97. The electrophysiology service(Dr. Almanzar) was asked for imput according to the hospitalist Dr. Hernandez regards to placement of a life vest while she undergoes 3 months of medical therapy to see if she needs an ICD. He informed me that he was told that like tests are not used as a bridge at this time as they have been shown to be ineffective and not to impact on sudden death. Patient at this time states she feels fine and offers no complaints of chest discomfort shortness of breath palpitations dizziness or syncope.

## 2019-07-24 LAB
ALBUMIN SERPL ELPH-MCNC: 4.4 G/DL
ALP BLD-CCNC: 106 U/L
ALT SERPL-CCNC: 27 U/L
ANION GAP SERPL CALC-SCNC: 15 MMOL/L
AST SERPL-CCNC: 22 U/L
BASOPHILS # BLD AUTO: 0.17 K/UL
BASOPHILS NFR BLD AUTO: 1.7 %
BILIRUB SERPL-MCNC: 0.6 MG/DL
BUN SERPL-MCNC: 27 MG/DL
CALCIUM SERPL-MCNC: 9.9 MG/DL
CHLORIDE SERPL-SCNC: 103 MMOL/L
CHOLEST SERPL-MCNC: 169 MG/DL
CHOLEST/HDLC SERPL: 3 RATIO
CO2 SERPL-SCNC: 20 MMOL/L
CREAT SERPL-MCNC: 1.17 MG/DL
EOSINOPHIL # BLD AUTO: 0.83 K/UL
EOSINOPHIL NFR BLD AUTO: 8.2 %
GLUCOSE SERPL-MCNC: 117 MG/DL
HCT VFR BLD CALC: 38.4 %
HDLC SERPL-MCNC: 57 MG/DL
HGB BLD-MCNC: 12.2 G/DL
IMM GRANULOCYTES NFR BLD AUTO: 0.3 %
LDLC SERPL CALC-MCNC: 93 MG/DL
LYMPHOCYTES # BLD AUTO: 1.46 K/UL
LYMPHOCYTES NFR BLD AUTO: 14.5 %
MAN DIFF?: NORMAL
MCHC RBC-ENTMCNC: 31.8 GM/DL
MCHC RBC-ENTMCNC: 32.2 PG
MCV RBC AUTO: 101.3 FL
MONOCYTES # BLD AUTO: 1 K/UL
MONOCYTES NFR BLD AUTO: 9.9 %
NEUTROPHILS # BLD AUTO: 6.6 K/UL
NEUTROPHILS NFR BLD AUTO: 65.4 %
PLATELET # BLD AUTO: 296 K/UL
POTASSIUM SERPL-SCNC: 5.5 MMOL/L
PROT SERPL-MCNC: 6.5 G/DL
RBC # BLD: 3.79 M/UL
RBC # FLD: 13.3 %
SODIUM SERPL-SCNC: 138 MMOL/L
T3 SERPL-MCNC: 113 NG/DL
T4 SERPL-MCNC: 8.6 UG/DL
TRIGL SERPL-MCNC: 97 MG/DL
TSH SERPL-ACNC: 2.95 UIU/ML
WBC # FLD AUTO: 10.09 K/UL

## 2019-07-25 ENCOUNTER — APPOINTMENT (OUTPATIENT)
Dept: CARDIOLOGY | Facility: CLINIC | Age: 79
End: 2019-07-25
Payer: MEDICARE

## 2019-07-25 ENCOUNTER — NON-APPOINTMENT (OUTPATIENT)
Age: 79
End: 2019-07-25

## 2019-07-25 VITALS
WEIGHT: 146 LBS | HEART RATE: 94 BPM | BODY MASS INDEX: 23.46 KG/M2 | OXYGEN SATURATION: 95 % | SYSTOLIC BLOOD PRESSURE: 106 MMHG | HEIGHT: 66 IN | DIASTOLIC BLOOD PRESSURE: 69 MMHG | RESPIRATION RATE: 16 BRPM

## 2019-07-25 VITALS — SYSTOLIC BLOOD PRESSURE: 120 MMHG | DIASTOLIC BLOOD PRESSURE: 72 MMHG | HEART RATE: 88 BPM

## 2019-07-25 LAB
INR PPP: 3.5 RATIO
POCT-PROTHROMBIN TIME: 42.1 SECS
QUALITY CONTROL: YES

## 2019-07-25 PROCEDURE — 99214 OFFICE O/P EST MOD 30 MIN: CPT | Mod: 25

## 2019-07-25 PROCEDURE — 85610 PROTHROMBIN TIME: CPT | Mod: QW

## 2019-07-25 PROCEDURE — 93000 ELECTROCARDIOGRAM COMPLETE: CPT

## 2019-07-25 RX ORDER — SPIRONOLACTONE 25 MG/1
25 TABLET, FILM COATED ORAL
Refills: 0 | Status: DISCONTINUED | COMMUNITY
Start: 2019-07-18 | End: 2019-07-25

## 2019-07-25 NOTE — PHYSICAL EXAM
[General Appearance - Well Developed] : well developed [Normal Appearance] : normal appearance [Well Groomed] : well groomed [General Appearance - Well Nourished] : well nourished [No Deformities] : no deformities [General Appearance - In No Acute Distress] : no acute distress [Normal Oral Mucosa] : normal oral mucosa [No Oral Pallor] : no oral pallor [No Oral Cyanosis] : no oral cyanosis [Normal Jugular Venous A Waves Present] : normal jugular venous A waves present [Normal Jugular Venous V Waves Present] : normal jugular venous V waves present [No Jugular Venous Newman A Waves] : no jugular venous newman A waves [Respiration, Rhythm And Depth] : normal respiratory rhythm and effort [Exaggerated Use Of Accessory Muscles For Inspiration] : no accessory muscle use [Auscultation Breath Sounds / Voice Sounds] : lungs were clear to auscultation bilaterally [Abdomen Soft] : soft [Abdomen Tenderness] : non-tender [Abdomen Mass (___ Cm)] : no abdominal mass palpated [Abnormal Walk] : normal gait [Gait - Sufficient For Exercise Testing] : the gait was sufficient for exercise testing [Nail Clubbing] : no clubbing of the fingernails [Cyanosis, Localized] : no localized cyanosis [Petechial Hemorrhages (___cm)] : no petechial hemorrhages [Skin Color & Pigmentation] : normal skin color and pigmentation [] : no rash [No Venous Stasis] : no venous stasis [Skin Lesions] : no skin lesions [No Skin Ulcers] : no skin ulcer [No Xanthoma] : no  xanthoma was observed [Oriented To Time, Place, And Person] : oriented to person, place, and time [Affect] : the affect was normal [Mood] : the mood was normal [No Anxiety] : not feeling anxious [Normal Rate] : normal [Normal S1] : normal S1 [Normal S2] : normal S2 [S3] : no S3 [S4] : no S4 [No Murmur] : no murmurs heard [Right Carotid Bruit] : no bruit heard over the right carotid [Left Carotid Bruit] : no bruit heard over the left carotid [Right Femoral Bruit] : no bruit heard over the right femoral artery [Left Femoral Bruit] : no bruit heard over the left femoral artery [2+] : left 2+ [No Abnormalities] : the abdominal aorta was not enlarged and no bruit was heard [No Pitting Edema] : no pitting edema present

## 2019-07-25 NOTE — ASSESSMENT
[FreeTextEntry1] : Impression:\par 1. Coronary artery disease status post recent non-STEMI\par 2. Cardiomyopathy most probably independent disease process\par \par Plan:\par 1. With potassium of 5.5 believe that risk of Aldactone outweighs the benefit especially since she is on relatively low dose lisinopril that may be able to be increased. Therefore will discontinue Aldactone\par 2. decrease Coumadin to 2.5 mg per day.

## 2019-07-25 NOTE — REASON FOR VISIT
[Follow-Up - Clinic] : a clinic follow-up of [Cardiomyopathy] : cardiomyopathy [Coronary Artery Disease] : coronary artery disease [FreeTextEntry1] : Patient returns for followup. Feeling well. Offers no complaints of chest discomfort shortness of breath palpitations dizziness or syncope.INR today is 3.5. Her potassium level is 5.5\par

## 2019-07-26 ENCOUNTER — OUTPATIENT (OUTPATIENT)
Dept: OUTPATIENT SERVICES | Facility: HOSPITAL | Age: 79
LOS: 1 days | End: 2019-07-26
Payer: MEDICARE

## 2019-07-26 ENCOUNTER — APPOINTMENT (OUTPATIENT)
Dept: MAMMOGRAPHY | Facility: HOSPITAL | Age: 79
End: 2019-07-26
Payer: MEDICARE

## 2019-07-26 DIAGNOSIS — Z00.8 ENCOUNTER FOR OTHER GENERAL EXAMINATION: ICD-10-CM

## 2019-07-26 PROCEDURE — 77067 SCR MAMMO BI INCL CAD: CPT | Mod: 26

## 2019-07-26 PROCEDURE — 77063 BREAST TOMOSYNTHESIS BI: CPT

## 2019-07-26 PROCEDURE — 77063 BREAST TOMOSYNTHESIS BI: CPT | Mod: 26

## 2019-07-26 PROCEDURE — 77067 SCR MAMMO BI INCL CAD: CPT

## 2019-08-01 ENCOUNTER — APPOINTMENT (OUTPATIENT)
Dept: CARDIOLOGY | Facility: CLINIC | Age: 79
End: 2019-08-01
Payer: MEDICARE

## 2019-08-01 LAB
INR PPP: 1.2 RATIO
POCT-PROTHROMBIN TIME: 14.5 SECS
QUALITY CONTROL: YES

## 2019-08-01 PROCEDURE — 85610 PROTHROMBIN TIME: CPT | Mod: QW

## 2019-08-01 PROCEDURE — 99211 OFF/OP EST MAY X REQ PHY/QHP: CPT | Mod: 25

## 2019-08-08 ENCOUNTER — APPOINTMENT (OUTPATIENT)
Dept: CARDIOLOGY | Facility: CLINIC | Age: 79
End: 2019-08-08
Payer: MEDICARE

## 2019-08-08 ENCOUNTER — NON-APPOINTMENT (OUTPATIENT)
Age: 79
End: 2019-08-08

## 2019-08-08 VITALS
BODY MASS INDEX: 23.63 KG/M2 | HEART RATE: 91 BPM | HEIGHT: 66 IN | OXYGEN SATURATION: 97 % | SYSTOLIC BLOOD PRESSURE: 150 MMHG | RESPIRATION RATE: 16 BRPM | WEIGHT: 147 LBS | DIASTOLIC BLOOD PRESSURE: 78 MMHG

## 2019-08-08 VITALS — SYSTOLIC BLOOD PRESSURE: 120 MMHG | DIASTOLIC BLOOD PRESSURE: 80 MMHG | HEART RATE: 86 BPM

## 2019-08-08 LAB
INR PPP: 1.2 RATIO
POCT-PROTHROMBIN TIME: 14.6 SECS
QUALITY CONTROL: YES

## 2019-08-08 PROCEDURE — 99214 OFFICE O/P EST MOD 30 MIN: CPT | Mod: 25

## 2019-08-08 PROCEDURE — 85610 PROTHROMBIN TIME: CPT | Mod: QW

## 2019-08-08 PROCEDURE — 93000 ELECTROCARDIOGRAM COMPLETE: CPT

## 2019-08-08 NOTE — ASSESSMENT
[FreeTextEntry1] : Impression:\par 1. Coronary artery disease status post recent non-STEMI\par 2. Cardiomyopathy most probably independent disease process\par \par Plan:\par 1..Continue current regimen\par 2. Coumadin adjusted by nurse as per INR

## 2019-08-08 NOTE — REASON FOR VISIT
[Follow-Up - Clinic] : a clinic follow-up of [Cardiomyopathy] : cardiomyopathy [Coronary Artery Disease] : coronary artery disease [Medication Management] : Medication management [FreeTextEntry1] : Patient returns for followup. Feeling well. Offers no complaints of chest discomfort shortness of breath palpitations dizziness or syncope.\par

## 2019-08-15 ENCOUNTER — APPOINTMENT (OUTPATIENT)
Dept: CARDIOLOGY | Facility: CLINIC | Age: 79
End: 2019-08-15
Payer: MEDICARE

## 2019-08-15 VITALS — RESPIRATION RATE: 18 BRPM | OXYGEN SATURATION: 95 %

## 2019-08-15 LAB
INR PPP: 1.4 RATIO
POCT-PROTHROMBIN TIME: 16.6 SECS
QUALITY CONTROL: YES

## 2019-08-15 PROCEDURE — 99211 OFF/OP EST MAY X REQ PHY/QHP: CPT | Mod: 25

## 2019-08-15 PROCEDURE — 85610 PROTHROMBIN TIME: CPT | Mod: QW

## 2019-08-26 ENCOUNTER — APPOINTMENT (OUTPATIENT)
Dept: CARDIOLOGY | Facility: CLINIC | Age: 79
End: 2019-08-26
Payer: MEDICARE

## 2019-08-26 LAB
INR PPP: 1.5 RATIO
POCT-PROTHROMBIN TIME: 18.1 SECS
QUALITY CONTROL: YES

## 2019-08-26 PROCEDURE — 85610 PROTHROMBIN TIME: CPT | Mod: QW

## 2019-08-26 PROCEDURE — 99211 OFF/OP EST MAY X REQ PHY/QHP: CPT | Mod: 25

## 2019-09-07 NOTE — ED ADULT TRIAGE NOTE - TEMPERATURE IN CELSIUS (DEGREES C)
[facility] EMERGENCY DEPT 
[address] [facility] [place] [facility] [address] 
[phone] Work/School Note Date: 9/7/2019 To Whom It May concern: 
 
Cory English was seen and treated today in the emergency room by the following provider(s): 
Attending Provider: Mitch Garsia MD. Cory English Special Instructions: Work excuse today and tomorrow Sincerely, 
 
 
 
 
Cristi Doe MD 
 
 
 
 36.7

## 2019-09-09 ENCOUNTER — APPOINTMENT (OUTPATIENT)
Dept: CARDIOLOGY | Facility: CLINIC | Age: 79
End: 2019-09-09
Payer: MEDICARE

## 2019-09-09 PROCEDURE — 99211 OFF/OP EST MAY X REQ PHY/QHP: CPT | Mod: 25

## 2019-09-09 PROCEDURE — 85610 PROTHROMBIN TIME: CPT | Mod: QW

## 2019-09-10 LAB
ALBUMIN SERPL ELPH-MCNC: 3.9 G/DL
ANION GAP SERPL CALC-SCNC: 11 MMOL/L
BUN SERPL-MCNC: 14 MG/DL
CALCIUM SERPL-MCNC: 9.4 MG/DL
CHLORIDE SERPL-SCNC: 107 MMOL/L
CO2 SERPL-SCNC: 26 MMOL/L
CREAT SERPL-MCNC: 0.94 MG/DL
GLUCOSE SERPL-MCNC: 101 MG/DL
PHOSPHATE SERPL-MCNC: 3.6 MG/DL
POTASSIUM SERPL-SCNC: 5.5 MMOL/L
SODIUM SERPL-SCNC: 144 MMOL/L

## 2019-09-11 LAB
INR PPP: 2.3 RATIO
POCT-PROTHROMBIN TIME: 27.7 SECS
QUALITY CONTROL: YES

## 2019-09-12 ENCOUNTER — NON-APPOINTMENT (OUTPATIENT)
Age: 79
End: 2019-09-12

## 2019-09-12 ENCOUNTER — APPOINTMENT (OUTPATIENT)
Dept: CARDIOLOGY | Facility: CLINIC | Age: 79
End: 2019-09-12
Payer: MEDICARE

## 2019-09-12 VITALS — HEART RATE: 78 BPM | SYSTOLIC BLOOD PRESSURE: 128 MMHG | DIASTOLIC BLOOD PRESSURE: 86 MMHG

## 2019-09-12 VITALS
WEIGHT: 146 LBS | HEIGHT: 66 IN | OXYGEN SATURATION: 97 % | RESPIRATION RATE: 16 BRPM | HEART RATE: 78 BPM | DIASTOLIC BLOOD PRESSURE: 53 MMHG | BODY MASS INDEX: 23.46 KG/M2 | SYSTOLIC BLOOD PRESSURE: 123 MMHG

## 2019-09-12 VITALS — DIASTOLIC BLOOD PRESSURE: 66 MMHG | SYSTOLIC BLOOD PRESSURE: 134 MMHG

## 2019-09-12 PROCEDURE — 93000 ELECTROCARDIOGRAM COMPLETE: CPT

## 2019-09-12 PROCEDURE — 99214 OFFICE O/P EST MOD 30 MIN: CPT

## 2019-09-12 NOTE — ASSESSMENT
[FreeTextEntry1] : Impression:\par 1. Coronary artery disease status post  non-STEMI\par 2. Cardiomyopathy most probably independent disease process\par \par Plan:\par 1..Continue current regimen\par 2. Reassess in one month's time with echocardiography to see if there is improvement in LV function and whether or not consideration needs to be given to ICD. Also will most probably stop aspirin as after 3 months triple antiplatelet/anticoagulant therapy the risk outweighs the benefit

## 2019-09-12 NOTE — PHYSICAL EXAM
[Normal Appearance] : normal appearance [General Appearance - Well Developed] : well developed [Well Groomed] : well groomed [General Appearance - Well Nourished] : well nourished [No Deformities] : no deformities [General Appearance - In No Acute Distress] : no acute distress [No Oral Pallor] : no oral pallor [Normal Oral Mucosa] : normal oral mucosa [No Oral Cyanosis] : no oral cyanosis [Normal Jugular Venous A Waves Present] : normal jugular venous A waves present [Normal Jugular Venous V Waves Present] : normal jugular venous V waves present [No Jugular Venous Newman A Waves] : no jugular venous newman A waves [Respiration, Rhythm And Depth] : normal respiratory rhythm and effort [Exaggerated Use Of Accessory Muscles For Inspiration] : no accessory muscle use [Auscultation Breath Sounds / Voice Sounds] : lungs were clear to auscultation bilaterally [Abdomen Soft] : soft [Abdomen Tenderness] : non-tender [Abdomen Mass (___ Cm)] : no abdominal mass palpated [Abnormal Walk] : normal gait [Gait - Sufficient For Exercise Testing] : the gait was sufficient for exercise testing [Cyanosis, Localized] : no localized cyanosis [Nail Clubbing] : no clubbing of the fingernails [Petechial Hemorrhages (___cm)] : no petechial hemorrhages [Skin Color & Pigmentation] : normal skin color and pigmentation [] : no rash [No Venous Stasis] : no venous stasis [Skin Lesions] : no skin lesions [No Skin Ulcers] : no skin ulcer [No Xanthoma] : no  xanthoma was observed [Oriented To Time, Place, And Person] : oriented to person, place, and time [Affect] : the affect was normal [No Anxiety] : not feeling anxious [Mood] : the mood was normal [Normal Rate] : normal [Normal S1] : normal S1 [Normal S2] : normal S2 [S3] : no S3 [S4] : no S4 [No Murmur] : no murmurs heard [Right Carotid Bruit] : no bruit heard over the right carotid [Left Carotid Bruit] : no bruit heard over the left carotid [Left Femoral Bruit] : no bruit heard over the left femoral artery [Right Femoral Bruit] : no bruit heard over the right femoral artery [No Abnormalities] : the abdominal aorta was not enlarged and no bruit was heard [2+] : left 2+ [No Pitting Edema] : no pitting edema present

## 2019-09-12 NOTE — REASON FOR VISIT
[Follow-Up - Clinic] : a clinic follow-up of [Coronary Artery Disease] : coronary artery disease [Cardiomyopathy] : cardiomyopathy [FreeTextEntry1] : Patient returns for followup. Feeling well. Offers no complaints of chest discomfort shortness of breath palpitations dizziness or syncope. Potassium remains unchanged at 5.5. She has yet to complete the workup with Dr. Kelly\par

## 2019-09-23 ENCOUNTER — APPOINTMENT (OUTPATIENT)
Dept: CARDIOLOGY | Facility: CLINIC | Age: 79
End: 2019-09-23
Payer: MEDICARE

## 2019-09-23 LAB
INR PPP: 1.9 RATIO
POCT-PROTHROMBIN TIME: 22.9 SECS
QUALITY CONTROL: YES

## 2019-09-23 PROCEDURE — 99211 OFF/OP EST MAY X REQ PHY/QHP: CPT | Mod: 25

## 2019-09-23 PROCEDURE — 85610 PROTHROMBIN TIME: CPT | Mod: QW

## 2019-10-01 PROCEDURE — 84443 ASSAY THYROID STIM HORMONE: CPT

## 2019-10-01 PROCEDURE — 84436 ASSAY OF TOTAL THYROXINE: CPT

## 2019-10-01 PROCEDURE — 99152 MOD SED SAME PHYS/QHP 5/>YRS: CPT

## 2019-10-01 PROCEDURE — 86850 RBC ANTIBODY SCREEN: CPT

## 2019-10-01 PROCEDURE — C1725: CPT

## 2019-10-01 PROCEDURE — C1894: CPT

## 2019-10-01 PROCEDURE — 83735 ASSAY OF MAGNESIUM: CPT

## 2019-10-01 PROCEDURE — 83036 HEMOGLOBIN GLYCOSYLATED A1C: CPT

## 2019-10-01 PROCEDURE — 86901 BLOOD TYPING SEROLOGIC RH(D): CPT

## 2019-10-01 PROCEDURE — C8929: CPT

## 2019-10-01 PROCEDURE — 93458 L HRT ARTERY/VENTRICLE ANGIO: CPT

## 2019-10-01 PROCEDURE — 82550 ASSAY OF CK (CPK): CPT

## 2019-10-01 PROCEDURE — C9602: CPT | Mod: LD

## 2019-10-01 PROCEDURE — 84484 ASSAY OF TROPONIN QUANT: CPT

## 2019-10-01 PROCEDURE — C1889: CPT

## 2019-10-01 PROCEDURE — 84100 ASSAY OF PHOSPHORUS: CPT

## 2019-10-01 PROCEDURE — 85027 COMPLETE CBC AUTOMATED: CPT

## 2019-10-01 PROCEDURE — 85730 THROMBOPLASTIN TIME PARTIAL: CPT

## 2019-10-01 PROCEDURE — C1887: CPT

## 2019-10-01 PROCEDURE — 99153 MOD SED SAME PHYS/QHP EA: CPT

## 2019-10-01 PROCEDURE — C1769: CPT

## 2019-10-01 PROCEDURE — C1724: CPT

## 2019-10-01 PROCEDURE — 33990 INSJ PERQ VAD L HRT ARTERIAL: CPT

## 2019-10-01 PROCEDURE — 86900 BLOOD TYPING SEROLOGIC ABO: CPT

## 2019-10-01 PROCEDURE — 85610 PROTHROMBIN TIME: CPT

## 2019-10-01 PROCEDURE — C1874: CPT

## 2019-10-01 PROCEDURE — C1760: CPT

## 2019-10-01 PROCEDURE — 82553 CREATINE MB FRACTION: CPT

## 2019-10-01 PROCEDURE — 84480 ASSAY TRIIODOTHYRONINE (T3): CPT

## 2019-10-01 PROCEDURE — 80048 BASIC METABOLIC PNL TOTAL CA: CPT

## 2019-10-01 PROCEDURE — 93005 ELECTROCARDIOGRAM TRACING: CPT

## 2019-10-04 LAB
ALBUMIN SERPL ELPH-MCNC: 4.3 G/DL
ANION GAP SERPL CALC-SCNC: 15 MMOL/L
BASOPHILS # BLD AUTO: 0.11 K/UL
BASOPHILS NFR BLD AUTO: 1.4 %
BUN SERPL-MCNC: 19 MG/DL
CALCIUM SERPL-MCNC: 9.7 MG/DL
CHLORIDE SERPL-SCNC: 103 MMOL/L
CO2 SERPL-SCNC: 24 MMOL/L
CREAT SERPL-MCNC: 1 MG/DL
EOSINOPHIL # BLD AUTO: 0.96 K/UL
EOSINOPHIL NFR BLD AUTO: 12.2 %
GLUCOSE SERPL-MCNC: 91 MG/DL
HCT VFR BLD CALC: 36.7 %
HGB BLD-MCNC: 11.7 G/DL
IMM GRANULOCYTES NFR BLD AUTO: 0.3 %
LYMPHOCYTES # BLD AUTO: 2.02 K/UL
LYMPHOCYTES NFR BLD AUTO: 25.7 %
MAN DIFF?: NORMAL
MCHC RBC-ENTMCNC: 31.7 PG
MCHC RBC-ENTMCNC: 31.9 GM/DL
MCV RBC AUTO: 99.5 FL
MONOCYTES # BLD AUTO: 0.82 K/UL
MONOCYTES NFR BLD AUTO: 10.4 %
NEUTROPHILS # BLD AUTO: 3.94 K/UL
NEUTROPHILS NFR BLD AUTO: 50 %
PHOSPHATE SERPL-MCNC: 3.9 MG/DL
PLATELET # BLD AUTO: 272 K/UL
POTASSIUM SERPL-SCNC: 5.8 MMOL/L
RBC # BLD: 3.69 M/UL
RBC # FLD: 13.8 %
SODIUM SERPL-SCNC: 142 MMOL/L
WBC # FLD AUTO: 7.87 K/UL

## 2019-10-04 RX ORDER — LISINOPRIL 5 MG/1
5 TABLET ORAL
Qty: 90 | Refills: 0 | Status: DISCONTINUED | COMMUNITY
Start: 2019-07-18 | End: 2019-10-04

## 2019-10-08 ENCOUNTER — APPOINTMENT (OUTPATIENT)
Dept: CARDIOLOGY | Facility: CLINIC | Age: 79
End: 2019-10-08
Payer: MEDICARE

## 2019-10-08 ENCOUNTER — NON-APPOINTMENT (OUTPATIENT)
Age: 79
End: 2019-10-08

## 2019-10-08 VITALS
HEART RATE: 71 BPM | OXYGEN SATURATION: 97 % | BODY MASS INDEX: 23.63 KG/M2 | RESPIRATION RATE: 15 BRPM | HEIGHT: 66 IN | SYSTOLIC BLOOD PRESSURE: 169 MMHG | WEIGHT: 147 LBS | DIASTOLIC BLOOD PRESSURE: 92 MMHG

## 2019-10-08 VITALS — SYSTOLIC BLOOD PRESSURE: 160 MMHG | HEART RATE: 68 BPM | DIASTOLIC BLOOD PRESSURE: 80 MMHG

## 2019-10-08 VITALS — DIASTOLIC BLOOD PRESSURE: 96 MMHG | SYSTOLIC BLOOD PRESSURE: 178 MMHG

## 2019-10-08 LAB
INR PPP: 2.2 RATIO
POCT-PROTHROMBIN TIME: 26.4 SECS
QUALITY CONTROL: YES

## 2019-10-08 PROCEDURE — 93000 ELECTROCARDIOGRAM COMPLETE: CPT

## 2019-10-08 PROCEDURE — 93306 TTE W/DOPPLER COMPLETE: CPT

## 2019-10-08 PROCEDURE — 99214 OFFICE O/P EST MOD 30 MIN: CPT

## 2019-10-08 PROCEDURE — 85610 PROTHROMBIN TIME: CPT | Mod: QW

## 2019-10-08 RX ORDER — KRILL/OM-3/DHA/EPA/PHOSPHO/AST 1000-230MG
81 CAPSULE ORAL
Refills: 0 | Status: DISCONTINUED | COMMUNITY
Start: 2019-07-18 | End: 2019-10-08

## 2019-10-08 NOTE — REASON FOR VISIT
[Follow-Up - Clinic] : a clinic follow-up of [Cardiomyopathy] : cardiomyopathy [Coronary Artery Disease] : coronary artery disease [Medication Management] : Medication management [FreeTextEntry1] : Patient returns for followup. Feeling well. Offers no complaints of chest discomfort shortness of breath palpitations dizziness or syncope.I discontinued her lisinopril several days ago due to worsening hyperkalemia with a potassium level of 5.8. She underwent echocardiography today which reveals marked improvement in her LV systolic function with an ejection fraction between 40 and 45% at least.She has still not completed her GYN workup.\par

## 2019-10-08 NOTE — PHYSICAL EXAM
[Well Groomed] : well groomed [Normal Appearance] : normal appearance [General Appearance - Well Developed] : well developed [No Deformities] : no deformities [General Appearance - Well Nourished] : well nourished [Normal Oral Mucosa] : normal oral mucosa [General Appearance - In No Acute Distress] : no acute distress [No Oral Pallor] : no oral pallor [No Oral Cyanosis] : no oral cyanosis [Normal Jugular Venous A Waves Present] : normal jugular venous A waves present [Normal Jugular Venous V Waves Present] : normal jugular venous V waves present [No Jugular Venous Newman A Waves] : no jugular venous newman A waves [Respiration, Rhythm And Depth] : normal respiratory rhythm and effort [Exaggerated Use Of Accessory Muscles For Inspiration] : no accessory muscle use [Auscultation Breath Sounds / Voice Sounds] : lungs were clear to auscultation bilaterally [Abdomen Soft] : soft [Abdomen Tenderness] : non-tender [Abdomen Mass (___ Cm)] : no abdominal mass palpated [Gait - Sufficient For Exercise Testing] : the gait was sufficient for exercise testing [Abnormal Walk] : normal gait [Cyanosis, Localized] : no localized cyanosis [Nail Clubbing] : no clubbing of the fingernails [Petechial Hemorrhages (___cm)] : no petechial hemorrhages [Skin Color & Pigmentation] : normal skin color and pigmentation [] : no rash [No Venous Stasis] : no venous stasis [No Skin Ulcers] : no skin ulcer [Skin Lesions] : no skin lesions [No Xanthoma] : no  xanthoma was observed [Oriented To Time, Place, And Person] : oriented to person, place, and time [Affect] : the affect was normal [Mood] : the mood was normal [No Anxiety] : not feeling anxious [Normal Rate] : normal [Normal S2] : normal S2 [Normal S1] : normal S1 [No Murmur] : no murmurs heard [2+] : right 2+ [No Abnormalities] : the abdominal aorta was not enlarged and no bruit was heard [No Pitting Edema] : no pitting edema present [S3] : no S3 [Left Carotid Bruit] : no bruit heard over the left carotid [S4] : no S4 [Right Carotid Bruit] : no bruit heard over the right carotid [Right Femoral Bruit] : no bruit heard over the right femoral artery [Left Femoral Bruit] : no bruit heard over the left femoral artery

## 2019-10-08 NOTE — ASSESSMENT
[FreeTextEntry1] : Impression:\par 1. Coronary artery disease status post  non-STEMI\par 2. Cardiomyopathy ?stunned myocardium-improved significantly\par \par Plan:\par 1..Discontinue aspirin\par 2. Start Apresoline 25 mg twice per day as afterload reducer and antihypertensive\par 3. Patient urged to complete her GYN malignancy workup\par 4. repeat renal profile to assess hyperkalemia

## 2019-10-12 LAB
ALBUMIN SERPL ELPH-MCNC: 4.4 G/DL
ANION GAP SERPL CALC-SCNC: 12 MMOL/L
BUN SERPL-MCNC: 18 MG/DL
CALCIUM SERPL-MCNC: 9.4 MG/DL
CHLORIDE SERPL-SCNC: 105 MMOL/L
CO2 SERPL-SCNC: 24 MMOL/L
CREAT SERPL-MCNC: 1.01 MG/DL
GLUCOSE SERPL-MCNC: 103 MG/DL
PHOSPHATE SERPL-MCNC: 3.7 MG/DL
POTASSIUM SERPL-SCNC: 5.3 MMOL/L
SODIUM SERPL-SCNC: 141 MMOL/L

## 2019-11-05 ENCOUNTER — APPOINTMENT (OUTPATIENT)
Dept: CARDIOLOGY | Facility: CLINIC | Age: 79
End: 2019-11-05
Payer: MEDICARE

## 2019-11-05 LAB
INR PPP: 2 RATIO
POCT-PROTHROMBIN TIME: 23.4 SECS
QUALITY CONTROL: YES

## 2019-11-05 PROCEDURE — 99211 OFF/OP EST MAY X REQ PHY/QHP: CPT | Mod: 25

## 2019-11-05 PROCEDURE — 85610 PROTHROMBIN TIME: CPT | Mod: QW

## 2019-11-21 ENCOUNTER — RX RENEWAL (OUTPATIENT)
Age: 79
End: 2019-11-21

## 2019-12-03 ENCOUNTER — APPOINTMENT (OUTPATIENT)
Dept: CARDIOLOGY | Facility: CLINIC | Age: 79
End: 2019-12-03
Payer: MEDICARE

## 2019-12-03 LAB
INR PPP: 1.8 RATIO
POCT-PROTHROMBIN TIME: 21.7 SECS
QUALITY CONTROL: YES

## 2019-12-03 PROCEDURE — 99211 OFF/OP EST MAY X REQ PHY/QHP: CPT | Mod: 25

## 2019-12-03 PROCEDURE — 85610 PROTHROMBIN TIME: CPT | Mod: QW

## 2019-12-23 ENCOUNTER — APPOINTMENT (OUTPATIENT)
Dept: CARDIOLOGY | Facility: CLINIC | Age: 79
End: 2019-12-23
Payer: MEDICARE

## 2019-12-23 PROCEDURE — 99211 OFF/OP EST MAY X REQ PHY/QHP: CPT | Mod: 25

## 2019-12-23 PROCEDURE — 85610 PROTHROMBIN TIME: CPT | Mod: QW

## 2019-12-24 LAB
INR PPP: 2.3 RATIO
POCT-PROTHROMBIN TIME: 27.9 SECS
QUALITY CONTROL: YES

## 2020-01-06 ENCOUNTER — RESULT CHARGE (OUTPATIENT)
Age: 80
End: 2020-01-06

## 2020-01-07 ENCOUNTER — APPOINTMENT (OUTPATIENT)
Dept: CARDIOLOGY | Facility: CLINIC | Age: 80
End: 2020-01-07
Payer: MEDICARE

## 2020-01-07 ENCOUNTER — NON-APPOINTMENT (OUTPATIENT)
Age: 80
End: 2020-01-07

## 2020-01-07 VITALS
SYSTOLIC BLOOD PRESSURE: 160 MMHG | BODY MASS INDEX: 24.43 KG/M2 | DIASTOLIC BLOOD PRESSURE: 83 MMHG | OXYGEN SATURATION: 100 % | HEIGHT: 66 IN | HEART RATE: 74 BPM | RESPIRATION RATE: 16 BRPM | WEIGHT: 152 LBS

## 2020-01-07 VITALS — DIASTOLIC BLOOD PRESSURE: 90 MMHG | HEART RATE: 78 BPM | SYSTOLIC BLOOD PRESSURE: 170 MMHG

## 2020-01-07 PROCEDURE — 99214 OFFICE O/P EST MOD 30 MIN: CPT

## 2020-01-07 PROCEDURE — 93000 ELECTROCARDIOGRAM COMPLETE: CPT

## 2020-01-20 ENCOUNTER — APPOINTMENT (OUTPATIENT)
Dept: CARDIOLOGY | Facility: CLINIC | Age: 80
End: 2020-01-20
Payer: MEDICARE

## 2020-01-20 LAB
INR PPP: 2.1 RATIO
POCT-PROTHROMBIN TIME: 24.8 SECS
QUALITY CONTROL: YES

## 2020-01-20 PROCEDURE — 99211 OFF/OP EST MAY X REQ PHY/QHP: CPT | Mod: 25

## 2020-01-20 PROCEDURE — 85610 PROTHROMBIN TIME: CPT | Mod: QW

## 2020-02-04 NOTE — ASSESSMENT
[FreeTextEntry1] : Impression:\par 1. Coronary artery disease status post  non-STEMI\par 2. Cardiomyopathy ?stunned myocardium-improved significantly\par 3. Hypertension poorly controlled\par \par Plan:\par 1.Add isosorbide dinitrate 10 mg twice a day for hypertension and preload reduction.

## 2020-02-04 NOTE — REASON FOR VISIT
[Follow-Up - Clinic] : a clinic follow-up of [Cardiomyopathy] : cardiomyopathy [Coronary Artery Disease] : coronary artery disease [FreeTextEntry1] : Patient returns for followup. Feeling well. Offers no complaints of chest discomfort shortness of breath palpitations dizziness or syncope. She has not followed up as instructed with GYN. She is complaining of easy bruisability on her arms and face but states this does resolve after several days only to have a new one form and she is able to do all her normal activities of daily living.\par

## 2020-02-04 NOTE — PHYSICAL EXAM
[General Appearance - Well Developed] : well developed [Normal Appearance] : normal appearance [General Appearance - Well Nourished] : well nourished [Well Groomed] : well groomed [No Deformities] : no deformities [General Appearance - In No Acute Distress] : no acute distress [Normal Oral Mucosa] : normal oral mucosa [No Oral Pallor] : no oral pallor [No Oral Cyanosis] : no oral cyanosis [Normal Jugular Venous A Waves Present] : normal jugular venous A waves present [Normal Jugular Venous V Waves Present] : normal jugular venous V waves present [No Jugular Venous Newman A Waves] : no jugular venous newman A waves [Respiration, Rhythm And Depth] : normal respiratory rhythm and effort [Exaggerated Use Of Accessory Muscles For Inspiration] : no accessory muscle use [Auscultation Breath Sounds / Voice Sounds] : lungs were clear to auscultation bilaterally [Abdomen Soft] : soft [Abdomen Tenderness] : non-tender [Abnormal Walk] : normal gait [Abdomen Mass (___ Cm)] : no abdominal mass palpated [Gait - Sufficient For Exercise Testing] : the gait was sufficient for exercise testing [Nail Clubbing] : no clubbing of the fingernails [Cyanosis, Localized] : no localized cyanosis [Petechial Hemorrhages (___cm)] : no petechial hemorrhages [No Venous Stasis] : no venous stasis [] : no rash [Skin Color & Pigmentation] : normal skin color and pigmentation [Skin Lesions] : no skin lesions [No Skin Ulcers] : no skin ulcer [No Xanthoma] : no  xanthoma was observed [Oriented To Time, Place, And Person] : oriented to person, place, and time [Affect] : the affect was normal [No Anxiety] : not feeling anxious [Mood] : the mood was normal [Normal Rate] : normal [Normal S1] : normal S1 [Normal S2] : normal S2 [No Murmur] : no murmurs heard [No Abnormalities] : the abdominal aorta was not enlarged and no bruit was heard [2+] : right 2+ [No Pitting Edema] : no pitting edema present [S3] : no S3 [Right Carotid Bruit] : no bruit heard over the right carotid [S4] : no S4 [Left Carotid Bruit] : no bruit heard over the left carotid [Right Femoral Bruit] : no bruit heard over the right femoral artery [Left Femoral Bruit] : no bruit heard over the left femoral artery

## 2020-02-18 ENCOUNTER — APPOINTMENT (OUTPATIENT)
Dept: CARDIOLOGY | Facility: CLINIC | Age: 80
End: 2020-02-18
Payer: MEDICARE

## 2020-02-18 LAB
INR PPP: 2.4 RATIO
POCT-PROTHROMBIN TIME: 29.2 SECS
QUALITY CONTROL: YES

## 2020-02-18 PROCEDURE — 85610 PROTHROMBIN TIME: CPT | Mod: QW

## 2020-02-18 PROCEDURE — 99211 OFF/OP EST MAY X REQ PHY/QHP: CPT | Mod: 25

## 2020-03-16 ENCOUNTER — APPOINTMENT (OUTPATIENT)
Dept: CARDIOLOGY | Facility: CLINIC | Age: 80
End: 2020-03-16
Payer: MEDICARE

## 2020-03-16 LAB
INR PPP: 2 RATIO
POCT-PROTHROMBIN TIME: 24.5 SECS
QUALITY CONTROL: YES

## 2020-03-16 PROCEDURE — 85610 PROTHROMBIN TIME: CPT | Mod: QW

## 2020-03-16 PROCEDURE — 99211 OFF/OP EST MAY X REQ PHY/QHP: CPT | Mod: 25

## 2020-04-20 ENCOUNTER — APPOINTMENT (OUTPATIENT)
Dept: CARDIOLOGY | Facility: CLINIC | Age: 80
End: 2020-04-20

## 2020-06-02 ENCOUNTER — APPOINTMENT (OUTPATIENT)
Dept: CARDIOLOGY | Facility: CLINIC | Age: 80
End: 2020-06-02
Payer: MEDICARE

## 2020-06-02 ENCOUNTER — NON-APPOINTMENT (OUTPATIENT)
Age: 80
End: 2020-06-02

## 2020-06-02 VITALS — SYSTOLIC BLOOD PRESSURE: 130 MMHG | DIASTOLIC BLOOD PRESSURE: 80 MMHG | HEART RATE: 72 BPM

## 2020-06-02 VITALS
WEIGHT: 155 LBS | BODY MASS INDEX: 24.91 KG/M2 | RESPIRATION RATE: 16 BRPM | HEIGHT: 66 IN | DIASTOLIC BLOOD PRESSURE: 93 MMHG | OXYGEN SATURATION: 99 % | SYSTOLIC BLOOD PRESSURE: 172 MMHG | HEART RATE: 80 BPM

## 2020-06-02 PROCEDURE — 99215 OFFICE O/P EST HI 40 MIN: CPT

## 2020-06-02 PROCEDURE — 93000 ELECTROCARDIOGRAM COMPLETE: CPT

## 2020-06-02 NOTE — PHYSICAL EXAM
[General Appearance - Well Developed] : well developed [Well Groomed] : well groomed [Normal Appearance] : normal appearance [General Appearance - Well Nourished] : well nourished [No Deformities] : no deformities [General Appearance - In No Acute Distress] : no acute distress [Normal Oral Mucosa] : normal oral mucosa [No Oral Pallor] : no oral pallor [No Oral Cyanosis] : no oral cyanosis [Normal Jugular Venous A Waves Present] : normal jugular venous A waves present [Normal Jugular Venous V Waves Present] : normal jugular venous V waves present [No Jugular Venous Newman A Waves] : no jugular venous newman A waves [Respiration, Rhythm And Depth] : normal respiratory rhythm and effort [Exaggerated Use Of Accessory Muscles For Inspiration] : no accessory muscle use [Abdomen Soft] : soft [Auscultation Breath Sounds / Voice Sounds] : lungs were clear to auscultation bilaterally [Abdomen Tenderness] : non-tender [Abdomen Mass (___ Cm)] : no abdominal mass palpated [Abnormal Walk] : normal gait [Gait - Sufficient For Exercise Testing] : the gait was sufficient for exercise testing [Nail Clubbing] : no clubbing of the fingernails [Cyanosis, Localized] : no localized cyanosis [Petechial Hemorrhages (___cm)] : no petechial hemorrhages [] : no rash [Skin Color & Pigmentation] : normal skin color and pigmentation [No Venous Stasis] : no venous stasis [Skin Lesions] : no skin lesions [No Skin Ulcers] : no skin ulcer [No Xanthoma] : no  xanthoma was observed [Oriented To Time, Place, And Person] : oriented to person, place, and time [Affect] : the affect was normal [No Anxiety] : not feeling anxious [Mood] : the mood was normal [Normal Rate] : normal [Normal S1] : normal S1 [Normal S2] : normal S2 [S3] : no S3 [S4] : no S4 [No Murmur] : no murmurs heard [Right Carotid Bruit] : no bruit heard over the right carotid [Left Carotid Bruit] : no bruit heard over the left carotid [Right Femoral Bruit] : no bruit heard over the right femoral artery [Left Femoral Bruit] : no bruit heard over the left femoral artery [2+] : right 2+ [No Abnormalities] : the abdominal aorta was not enlarged and no bruit was heard [No Pitting Edema] : no pitting edema present

## 2020-06-02 NOTE — REASON FOR VISIT
[Follow-Up - Clinic] : a clinic follow-up of [Coronary Artery Disease] : coronary artery disease [Cardiomyopathy] : cardiomyopathy [FreeTextEntry1] : Patient returns for followup. Feeling well. Offers no complaints of chest discomfort shortness of breath palpitations dizziness or syncope.Unfortunately she chose not to begin taking her Isordil. She was concerned about taking too many medications\par

## 2020-06-02 NOTE — ASSESSMENT
[FreeTextEntry1] : Impression:\par 1. Coronary artery disease status post  non-STEMI\par 2. Cardiomyopathy ?stunned myocardium-improved significantly\par 3. Hypertension poorly controlled\par \par Plan:\par 1.I have explained the role of all the patient's medications to her. I have explained at length the role of Isordil specifically. She will begin to take it.

## 2020-06-04 LAB
ALBUMIN SERPL ELPH-MCNC: 4.5 G/DL
ANION GAP SERPL CALC-SCNC: 13 MMOL/L
BUN SERPL-MCNC: 22 MG/DL
CALCIUM SERPL-MCNC: 9.3 MG/DL
CHLORIDE SERPL-SCNC: 103 MMOL/L
CO2 SERPL-SCNC: 25 MMOL/L
CREAT SERPL-MCNC: 1.03 MG/DL
GLUCOSE SERPL-MCNC: 105 MG/DL
PHOSPHATE SERPL-MCNC: 4.3 MG/DL
POTASSIUM SERPL-SCNC: 5.1 MMOL/L
SODIUM SERPL-SCNC: 141 MMOL/L

## 2020-08-06 ENCOUNTER — RX RENEWAL (OUTPATIENT)
Age: 80
End: 2020-08-06

## 2020-09-23 ENCOUNTER — RX RENEWAL (OUTPATIENT)
Age: 80
End: 2020-09-23

## 2020-10-06 ENCOUNTER — NON-APPOINTMENT (OUTPATIENT)
Age: 80
End: 2020-10-06

## 2020-10-06 ENCOUNTER — APPOINTMENT (OUTPATIENT)
Dept: CARDIOLOGY | Facility: CLINIC | Age: 80
End: 2020-10-06
Payer: MEDICARE

## 2020-10-06 VITALS
DIASTOLIC BLOOD PRESSURE: 80 MMHG | HEIGHT: 66 IN | RESPIRATION RATE: 16 BRPM | HEART RATE: 76 BPM | WEIGHT: 155 LBS | BODY MASS INDEX: 24.91 KG/M2 | TEMPERATURE: 98.2 F | OXYGEN SATURATION: 100 % | SYSTOLIC BLOOD PRESSURE: 178 MMHG

## 2020-10-06 VITALS — HEART RATE: 80 BPM | SYSTOLIC BLOOD PRESSURE: 138 MMHG | DIASTOLIC BLOOD PRESSURE: 80 MMHG

## 2020-10-06 PROCEDURE — 99214 OFFICE O/P EST MOD 30 MIN: CPT

## 2020-10-06 PROCEDURE — 93000 ELECTROCARDIOGRAM COMPLETE: CPT

## 2020-10-06 PROCEDURE — 93306 TTE W/DOPPLER COMPLETE: CPT

## 2020-10-06 NOTE — ASSESSMENT
[FreeTextEntry1] : Impression:\par 1. Coronary artery disease status post  non-STEMI\par 2. Cardiomyopathy improved significantly\par 3. Hypertension well controlled\par 4. Right carotid bruit\par \par Plan:\par 1.Carotid sonogram\par 2. Due to the improvement in blood pressure and patient's fear of excess medications will hold off on nitrates for now

## 2020-10-06 NOTE — REASON FOR VISIT
[Follow-Up - Clinic] : a clinic follow-up of [Cardiomyopathy] : cardiomyopathy [Coronary Artery Disease] : coronary artery disease [FreeTextEntry1] : Patient returns for followup. Feeling well. Offers no complaints of chest discomfort shortness of breath palpitations dizziness or syncope.She  underwent echocardiography today which continues to reveal mild improvement in her LV systolic function. She continues to neglect to take her Isordil because she forgot and in reality she states she is concerned about taking additional medication.\par

## 2020-10-13 ENCOUNTER — APPOINTMENT (OUTPATIENT)
Dept: CARDIOLOGY | Facility: CLINIC | Age: 80
End: 2020-10-13
Payer: MEDICARE

## 2020-10-13 PROCEDURE — 93880 EXTRACRANIAL BILAT STUDY: CPT

## 2020-11-09 ENCOUNTER — RX RENEWAL (OUTPATIENT)
Age: 80
End: 2020-11-09

## 2020-11-30 NOTE — PROGRESS NOTE ADULT - SUBJECTIVE AND OBJECTIVE BOX
MAY CALVILLO  78y  Female    Patient is a 78y old  Female who presents with a chief complaint of worsened chest pressure/tightness for 2-3 days (2019 09:37)    Pt seen and examined  no acute events overnight     PAST MEDICAL & SURGICAL HISTORY:  No pertinent past medical history  No significant past surgical history        MedsMEDICATIONS  (STANDING):  aspirin enteric coated 81 milliGRAM(s) Oral daily  enoxaparin Injectable 75 milliGRAM(s) SubCutaneous every 12 hours  furosemide   Injectable 40 milliGRAM(s) IV Push daily  metoprolol tartrate 12.5 milliGRAM(s) Oral two times a day  potassium chloride    Tablet ER 10 milliEquivalent(s) Oral daily    MEDICATIONS  (PRN):  acetaminophen   Tablet .. 650 milliGRAM(s) Oral every 6 hours PRN Temp greater or equal to 38C (100.4F), Mild Pain (1 - 3)      Vital Signs Last 24 Hrs  T(C): 36.4 (2019 04:20), Max: 36.7 (2019 15:30)  T(F): 97.5 (2019 04:20), Max: 98.1 (2019 15:30)  HR: 94 (2019 04:20) (94 - 103)  BP: 126/85 (2019 04:20) (126/85 - 144/77)  BP(mean): --  RR: 17 (2019 04:20) (16 - 17)  SpO2: 97% (2019 04:20) (94% - 97%)    PHYSICAL EXAM:  GENERAL: NAD  HEAD:  NC/AT  EYES: EOMI, PERRLA, conjunctiva and sclera clear  NERVOUS SYSTEM:  Alert & Oriented X3  CHEST/LUNG: Clear lungs b/l  HEART: S1S2, RRR   ABDOMEN: Soft, non-tender, non-distended, + bowel sounds  EXTREMITIES:  2+ Peripheral Pulses, No clubbing, cyanosis, or edema  SKIN: No rashes or lesions    LABS:                          12.0   10.05 )-----------( 304      ( 2019 05:10 )             36.4       07-05    135  |  99  |  13  ----------------------------<  97  4.5   |  27  |  0.85    Ca    9.0      2019 05:10  Mg     1.9     07-    TPro  7.5  /  Alb  3.6  /  TBili  0.6  /  DBili  x   /  AST  23  /  ALT  30  /  AlkPhos  164<H>  07-03      PT/INR - ( 2019 19:35 )   PT: 12.0 sec;   INR: 1.07 ratio         PTT - ( 2019 19:35 )  PTT:28.0 sec    CARDIAC MARKERS ( 2019 15:35 )  1.558 ng/mL / x     / x     / x     / x      CARDIAC MARKERS ( 2019 07:00 )  1.536 ng/mL / x     / x     / x     / x      CARDIAC MARKERS ( 2019 02:00 )  1.204 ng/mL / x     / x     / x     / x      CARDIAC MARKERS ( 2019 19:35 )  .878 ng/mL / x     / x     / x     / x            Urinalysis Basic - ( 2019 21:15 )    Color: Yellow / Appearance: slightly cloudy / S.030 / pH: x  Gluc: x / Ketone: Moderate  / Bili: Negative / Urobili: Negative   Blood: x / Protein: 30 mg/dL / Nitrite: Negative   Leuk Esterase: Small / RBC: 0-4 /HPF / WBC 6-10 /HPF   Sq Epi: x / Non Sq Epi: Neg.-Few / Bacteria: Few /HPF              Culture - Urine (collected 2019 21:15)  Source: .Urine Clean Catch (Midstream)  Final Report (2019 09:12):    >=3 organisms. Probable collection contamination.    Culture - Blood (collected 2019 19:35)  Source: .Blood Blood-Peripheral  Preliminary Report (2019 02:01):    No growth to date.    Culture - Blood (collected 2019 19:35)  Source: .Blood Blood-Peripheral  Preliminary Report (2019 02:01):    No growth to date.            Culture - Blood (collected 19 @ 19:35)  Source: .Blood Blood-Peripheral  Preliminary Report (19 @ 02:01):    No growth to date.    Culture - Blood (collected 19 @ 19:35)  Source: .Blood Blood-Peripheral  Preliminary Report (19 @ 02:01):    No growth to date.        Culture - Urine (collected 19 @ 21:15)  Source: .Urine Clean Catch (Midstream)  Final Report (19 @ 09:12):    >=3 organisms. Probable collection contamination.        RADIOLOGY & ADDITIONAL TESTS:    Imaging Personally Reviewed:  [ ] YES  [ ] NO      HEALTH ISSUES - PROBLEM Dx:  Pulmonary thromboembolism          Care Discussed with Consultants/Other Providers [ x] YES  [ ] NO normal...

## 2020-12-21 ENCOUNTER — APPOINTMENT (OUTPATIENT)
Dept: CARDIOLOGY | Facility: CLINIC | Age: 80
End: 2020-12-21
Payer: MEDICARE

## 2020-12-21 LAB
INR PPP: 2.7 RATIO
POCT-PROTHROMBIN TIME: 32.4 SECS
QUALITY CONTROL: YES

## 2020-12-21 PROCEDURE — 99211 OFF/OP EST MAY X REQ PHY/QHP: CPT | Mod: 25

## 2020-12-21 PROCEDURE — 85610 PROTHROMBIN TIME: CPT | Mod: QW

## 2020-12-28 ENCOUNTER — RX RENEWAL (OUTPATIENT)
Age: 80
End: 2020-12-28

## 2021-01-01 ENCOUNTER — APPOINTMENT (OUTPATIENT)
Dept: CARDIOLOGY | Facility: CLINIC | Age: 81
End: 2021-01-01
Payer: MEDICARE

## 2021-01-01 ENCOUNTER — NON-APPOINTMENT (OUTPATIENT)
Age: 81
End: 2021-01-01

## 2021-01-01 ENCOUNTER — OUTPATIENT (OUTPATIENT)
Dept: OUTPATIENT SERVICES | Facility: HOSPITAL | Age: 81
LOS: 1 days | End: 2021-01-01
Payer: MEDICARE

## 2021-01-01 ENCOUNTER — RX RENEWAL (OUTPATIENT)
Age: 81
End: 2021-01-01

## 2021-01-01 ENCOUNTER — APPOINTMENT (OUTPATIENT)
Dept: CARDIOLOGY | Facility: CLINIC | Age: 81
End: 2021-01-01

## 2021-01-01 ENCOUNTER — TRANSCRIPTION ENCOUNTER (OUTPATIENT)
Age: 81
End: 2021-01-01

## 2021-01-01 VITALS
TEMPERATURE: 98 F | HEIGHT: 66 IN | RESPIRATION RATE: 16 BRPM | HEART RATE: 74 BPM | WEIGHT: 145.95 LBS | OXYGEN SATURATION: 97 % | DIASTOLIC BLOOD PRESSURE: 83 MMHG | SYSTOLIC BLOOD PRESSURE: 177 MMHG

## 2021-01-01 VITALS
WEIGHT: 150 LBS | HEIGHT: 66 IN | TEMPERATURE: 97 F | BODY MASS INDEX: 24.11 KG/M2 | RESPIRATION RATE: 16 BRPM | HEART RATE: 87 BPM | OXYGEN SATURATION: 97 % | DIASTOLIC BLOOD PRESSURE: 98 MMHG | SYSTOLIC BLOOD PRESSURE: 181 MMHG

## 2021-01-01 VITALS
HEART RATE: 80 BPM | WEIGHT: 146 LBS | OXYGEN SATURATION: 99 % | TEMPERATURE: 97.2 F | SYSTOLIC BLOOD PRESSURE: 168 MMHG | DIASTOLIC BLOOD PRESSURE: 89 MMHG | RESPIRATION RATE: 16 BRPM | BODY MASS INDEX: 23.46 KG/M2 | HEIGHT: 66 IN

## 2021-01-01 VITALS — HEART RATE: 80 BPM | DIASTOLIC BLOOD PRESSURE: 80 MMHG | SYSTOLIC BLOOD PRESSURE: 140 MMHG

## 2021-01-01 VITALS — DIASTOLIC BLOOD PRESSURE: 70 MMHG | SYSTOLIC BLOOD PRESSURE: 122 MMHG | HEART RATE: 72 BPM

## 2021-01-01 VITALS
HEART RATE: 74 BPM | RESPIRATION RATE: 16 BRPM | BODY MASS INDEX: 25.23 KG/M2 | TEMPERATURE: 96.7 F | DIASTOLIC BLOOD PRESSURE: 74 MMHG | SYSTOLIC BLOOD PRESSURE: 186 MMHG | OXYGEN SATURATION: 97 % | WEIGHT: 157 LBS | HEIGHT: 66 IN

## 2021-01-01 VITALS
SYSTOLIC BLOOD PRESSURE: 120 MMHG | TEMPERATURE: 98 F | DIASTOLIC BLOOD PRESSURE: 71 MMHG | HEART RATE: 83 BPM | OXYGEN SATURATION: 98 % | RESPIRATION RATE: 16 BRPM

## 2021-01-01 VITALS — DIASTOLIC BLOOD PRESSURE: 82 MMHG | SYSTOLIC BLOOD PRESSURE: 148 MMHG | HEART RATE: 68 BPM

## 2021-01-01 VITALS
OXYGEN SATURATION: 98 % | BODY MASS INDEX: 23.46 KG/M2 | SYSTOLIC BLOOD PRESSURE: 142 MMHG | HEIGHT: 66 IN | TEMPERATURE: 97.3 F | WEIGHT: 146 LBS | DIASTOLIC BLOOD PRESSURE: 90 MMHG | HEART RATE: 79 BPM | RESPIRATION RATE: 19 BRPM

## 2021-01-01 VITALS — SYSTOLIC BLOOD PRESSURE: 120 MMHG | DIASTOLIC BLOOD PRESSURE: 86 MMHG

## 2021-01-01 VITALS
HEART RATE: 77 BPM | RESPIRATION RATE: 16 BRPM | OXYGEN SATURATION: 100 % | BODY MASS INDEX: 25.55 KG/M2 | WEIGHT: 159 LBS | DIASTOLIC BLOOD PRESSURE: 79 MMHG | TEMPERATURE: 97.2 F | SYSTOLIC BLOOD PRESSURE: 150 MMHG | HEIGHT: 66 IN

## 2021-01-01 VITALS — DIASTOLIC BLOOD PRESSURE: 100 MMHG | SYSTOLIC BLOOD PRESSURE: 160 MMHG

## 2021-01-01 VITALS — SYSTOLIC BLOOD PRESSURE: 150 MMHG | DIASTOLIC BLOOD PRESSURE: 90 MMHG | HEART RATE: 72 BPM

## 2021-01-01 VITALS — DIASTOLIC BLOOD PRESSURE: 100 MMHG | HEART RATE: 84 BPM | SYSTOLIC BLOOD PRESSURE: 170 MMHG

## 2021-01-01 VITALS — DIASTOLIC BLOOD PRESSURE: 90 MMHG | SYSTOLIC BLOOD PRESSURE: 180 MMHG

## 2021-01-01 DIAGNOSIS — Z95.5 PRESENCE OF CORONARY ANGIOPLASTY IMPLANT AND GRAFT: Chronic | ICD-10-CM

## 2021-01-01 DIAGNOSIS — I77.9 DISORDER OF ARTERIES AND ARTERIOLES, UNSPECIFIED: ICD-10-CM

## 2021-01-01 DIAGNOSIS — Z95.5 PRESENCE OF CORONARY ANGIOPLASTY IMPLANT AND GRAFT: ICD-10-CM

## 2021-01-01 DIAGNOSIS — H25.12 AGE-RELATED NUCLEAR CATARACT, LEFT EYE: ICD-10-CM

## 2021-01-01 DIAGNOSIS — I42.9 CARDIOMYOPATHY, UNSPECIFIED: ICD-10-CM

## 2021-01-01 DIAGNOSIS — Z20.828 CONTACT WITH AND (SUSPECTED) EXPOSURE TO OTHER VIRAL COMMUNICABLE DISEASES: ICD-10-CM

## 2021-01-01 DIAGNOSIS — I25.10 ATHEROSCLEROTIC HEART DISEASE OF NATIVE CORONARY ARTERY W/OUT ANGINA PECTORIS: ICD-10-CM

## 2021-01-01 DIAGNOSIS — E87.5 HYPERKALEMIA: ICD-10-CM

## 2021-01-01 LAB
ALBUMIN SERPL ELPH-MCNC: 4.1 G/DL
ALBUMIN SERPL ELPH-MCNC: 4.4 G/DL
ALBUMIN SERPL ELPH-MCNC: 4.5 G/DL
ALDOSTERONE SERUM: 20.6 NG/DL
ALP BLD-CCNC: 140 U/L
ALP BLD-CCNC: 156 U/L
ALT SERPL-CCNC: 26 U/L
ALT SERPL-CCNC: 32 U/L
ANION GAP SERPL CALC-SCNC: 14 MMOL/L
ANION GAP SERPL CALC-SCNC: 15 MMOL/L
ANION GAP SERPL CALC-SCNC: 16 MMOL/L
AST SERPL-CCNC: 28 U/L
AST SERPL-CCNC: 34 U/L
BASOPHILS # BLD AUTO: 0.08 K/UL
BASOPHILS # BLD AUTO: 0.09 K/UL
BASOPHILS NFR BLD AUTO: 0.8 %
BASOPHILS NFR BLD AUTO: 1.1 %
BILIRUB SERPL-MCNC: 0.5 MG/DL
BILIRUB SERPL-MCNC: 0.6 MG/DL
BUN SERPL-MCNC: 15 MG/DL
BUN SERPL-MCNC: 16 MG/DL
BUN SERPL-MCNC: 17 MG/DL
CALCIUM SERPL-MCNC: 9.4 MG/DL
CALCIUM SERPL-MCNC: 9.5 MG/DL
CALCIUM SERPL-MCNC: 9.5 MG/DL
CHLORIDE SERPL-SCNC: 100 MMOL/L
CHLORIDE SERPL-SCNC: 102 MMOL/L
CHLORIDE SERPL-SCNC: 106 MMOL/L
CHOLEST SERPL-MCNC: 162 MG/DL
CHOLEST SERPL-MCNC: 163 MG/DL
CO2 SERPL-SCNC: 20 MMOL/L
CO2 SERPL-SCNC: 22 MMOL/L
CO2 SERPL-SCNC: 22 MMOL/L
CREAT SERPL-MCNC: 0.88 MG/DL
CREAT SERPL-MCNC: 0.92 MG/DL
CREAT SERPL-MCNC: 1.04 MG/DL
EOSINOPHIL # BLD AUTO: 0.31 K/UL
EOSINOPHIL # BLD AUTO: 0.52 K/UL
EOSINOPHIL NFR BLD AUTO: 3.2 %
EOSINOPHIL NFR BLD AUTO: 6.1 %
GLUCOSE SERPL-MCNC: 104 MG/DL
GLUCOSE SERPL-MCNC: 83 MG/DL
GLUCOSE SERPL-MCNC: 88 MG/DL
HCT VFR BLD CALC: 40 %
HCT VFR BLD CALC: 44.2 %
HDLC SERPL-MCNC: 63 MG/DL
HDLC SERPL-MCNC: 65 MG/DL
HGB BLD-MCNC: 12.9 G/DL
HGB BLD-MCNC: 14.2 G/DL
IMM GRANULOCYTES NFR BLD AUTO: 0.2 %
IMM GRANULOCYTES NFR BLD AUTO: 0.2 %
INR PPP: 2 RATIO
INR PPP: 2.1 RATIO
INR PPP: 2.1 RATIO
INR PPP: 2.2 RATIO
INR PPP: 2.3 RATIO
INR PPP: 2.6 RATIO
INR PPP: 2.7 RATIO
INR PPP: 2.7 RATIO
INR PPP: 3 RATIO
INR PPP: 3.1 RATIO
LDLC SERPL CALC-MCNC: 81 MG/DL
LDLC SERPL CALC-MCNC: 84 MG/DL
LYMPHOCYTES # BLD AUTO: 1.94 K/UL
LYMPHOCYTES # BLD AUTO: 2.85 K/UL
LYMPHOCYTES NFR BLD AUTO: 19.8 %
LYMPHOCYTES NFR BLD AUTO: 33.5 %
MAN DIFF?: NORMAL
MAN DIFF?: NORMAL
MCHC RBC-ENTMCNC: 31.8 PG
MCHC RBC-ENTMCNC: 32 PG
MCHC RBC-ENTMCNC: 32.1 GM/DL
MCHC RBC-ENTMCNC: 32.3 GM/DL
MCV RBC AUTO: 99.1 FL
MCV RBC AUTO: 99.3 FL
MONOCYTES # BLD AUTO: 0.76 K/UL
MONOCYTES # BLD AUTO: 1.1 K/UL
MONOCYTES NFR BLD AUTO: 11.2 %
MONOCYTES NFR BLD AUTO: 8.9 %
NEUTROPHILS # BLD AUTO: 4.26 K/UL
NEUTROPHILS # BLD AUTO: 6.36 K/UL
NEUTROPHILS NFR BLD AUTO: 50.2 %
NEUTROPHILS NFR BLD AUTO: 64.8 %
NONHDLC SERPL-MCNC: 100 MG/DL
NONHDLC SERPL-MCNC: 98 MG/DL
PHOSPHATE SERPL-MCNC: 3.6 MG/DL
PLATELET # BLD AUTO: 255 K/UL
PLATELET # BLD AUTO: 283 K/UL
POCT-PROTHROMBIN TIME: 2.19 SECS
POCT-PROTHROMBIN TIME: 23.5 SECS
POCT-PROTHROMBIN TIME: 23.9 SECS
POCT-PROTHROMBIN TIME: 24.1 SECS
POCT-PROTHROMBIN TIME: 25.5 SECS
POCT-PROTHROMBIN TIME: 26.8 SECS
POCT-PROTHROMBIN TIME: 29.8 SECS
POCT-PROTHROMBIN TIME: 30.8 SECS
POCT-PROTHROMBIN TIME: 32.2 SECS
POCT-PROTHROMBIN TIME: 32.9 SECS
POCT-PROTHROMBIN TIME: 36.3 SECS
POCT-PROTHROMBIN TIME: 37 SECS
POTASSIUM SERPL-SCNC: 4.2 MMOL/L
POTASSIUM SERPL-SCNC: 4.3 MMOL/L
POTASSIUM SERPL-SCNC: 5.3 MMOL/L
POTASSIUM SERPL-SCNC: 6.1 MMOL/L
PROT SERPL-MCNC: 6.3 G/DL
PROT SERPL-MCNC: 6.7 G/DL
QUALITY CONTROL: YES
RBC # BLD: 4.03 M/UL
RBC # BLD: 4.46 M/UL
RBC # FLD: 13.8 %
RBC # FLD: 14.4 %
SARS-COV-2 RNA SPEC QL NAA+PROBE: SIGNIFICANT CHANGE UP
SODIUM SERPL-SCNC: 136 MMOL/L
SODIUM SERPL-SCNC: 140 MMOL/L
SODIUM SERPL-SCNC: 142 MMOL/L
T3 SERPL-MCNC: 106 NG/DL
T3 SERPL-MCNC: 122 NG/DL
T4 SERPL-MCNC: 7.4 UG/DL
T4 SERPL-MCNC: 9 UG/DL
TRIGL SERPL-MCNC: 79 MG/DL
TRIGL SERPL-MCNC: 87 MG/DL
TSH SERPL-ACNC: 1.66 UIU/ML
TSH SERPL-ACNC: 2.71 UIU/ML
WBC # FLD AUTO: 8.5 K/UL
WBC # FLD AUTO: 9.81 K/UL

## 2021-01-01 PROCEDURE — 99072 ADDL SUPL MATRL&STAF TM PHE: CPT

## 2021-01-01 PROCEDURE — 85610 PROTHROMBIN TIME: CPT | Mod: QW

## 2021-01-01 PROCEDURE — 99211 OFF/OP EST MAY X REQ PHY/QHP: CPT | Mod: 25

## 2021-01-01 PROCEDURE — 93000 ELECTROCARDIOGRAM COMPLETE: CPT

## 2021-01-01 PROCEDURE — 93306 TTE W/DOPPLER COMPLETE: CPT

## 2021-01-01 PROCEDURE — 99215 OFFICE O/P EST HI 40 MIN: CPT | Mod: 25

## 2021-01-01 PROCEDURE — 99214 OFFICE O/P EST MOD 30 MIN: CPT

## 2021-01-01 PROCEDURE — 66984 XCAPSL CTRC RMVL W/O ECP: CPT | Mod: LT

## 2021-01-01 PROCEDURE — 87635 SARS-COV-2 COVID-19 AMP PRB: CPT

## 2021-01-01 PROCEDURE — V2632: CPT

## 2021-01-01 PROCEDURE — 99214 OFFICE O/P EST MOD 30 MIN: CPT | Mod: 25

## 2021-01-01 PROCEDURE — 99215 OFFICE O/P EST HI 40 MIN: CPT

## 2021-01-01 RX ORDER — ASPIRIN 81 MG/1
81 TABLET, CHEWABLE ORAL
Refills: 0 | Status: ACTIVE | COMMUNITY
Start: 2021-01-01

## 2021-01-01 RX ORDER — FUROSEMIDE 40 MG/1
40 TABLET ORAL DAILY
Qty: 90 | Refills: 3 | Status: DISCONTINUED | COMMUNITY
Start: 2019-07-18 | End: 2021-01-01

## 2021-01-01 RX ORDER — KETOROLAC TROMETHAMINE 0.5 %
1 DROPS OPHTHALMIC (EYE)
Refills: 0 | Status: COMPLETED | OUTPATIENT
Start: 2021-01-01 | End: 2021-01-01

## 2021-01-01 RX ORDER — ISOSORBIDE DINITRATE 10 MG/1
10 TABLET ORAL
Qty: 180 | Refills: 3 | Status: DISCONTINUED | COMMUNITY
Start: 2020-01-07 | End: 2021-01-01

## 2021-01-01 RX ORDER — METOPROLOL SUCCINATE 50 MG/1
50 TABLET, EXTENDED RELEASE ORAL
Qty: 90 | Refills: 0 | Status: ACTIVE | COMMUNITY
Start: 2019-07-18 | End: 1900-01-01

## 2021-01-01 RX ORDER — TROPICAMIDE 1 %
1 DROPS OPHTHALMIC (EYE)
Refills: 0 | Status: COMPLETED | OUTPATIENT
Start: 2021-01-01 | End: 2021-01-01

## 2021-01-01 RX ORDER — FUROSEMIDE 40 MG
40 TABLET ORAL
Qty: 0 | Refills: 0 | DISCHARGE

## 2021-01-01 RX ORDER — WARFARIN 5 MG/1
5 TABLET ORAL
Qty: 108 | Refills: 3 | Status: ACTIVE | COMMUNITY
Start: 2019-07-18 | End: 1900-01-01

## 2021-01-01 RX ORDER — CYCLOPENTOLATE HYDROCHLORIDE 10 MG/ML
1 SOLUTION/ DROPS OPHTHALMIC
Refills: 0 | Status: COMPLETED | OUTPATIENT
Start: 2021-01-01 | End: 2021-01-01

## 2021-01-01 RX ORDER — HYDRALAZINE HYDROCHLORIDE 25 MG/1
25 TABLET ORAL
Qty: 180 | Refills: 0 | Status: ACTIVE | COMMUNITY
Start: 2019-10-08 | End: 1900-01-01

## 2021-01-01 RX ORDER — ATORVASTATIN CALCIUM 40 MG/1
40 TABLET, FILM COATED ORAL
Qty: 90 | Refills: 1 | Status: ACTIVE | COMMUNITY
Start: 2019-07-18 | End: 1900-01-01

## 2021-01-01 RX ORDER — CLOPIDOGREL BISULFATE 75 MG/1
75 TABLET, FILM COATED ORAL DAILY
Qty: 90 | Refills: 3 | Status: DISCONTINUED | COMMUNITY
End: 2021-01-01

## 2021-01-01 RX ORDER — PHENYLEPHRINE HCL 2.5 %
1 DROPS OPHTHALMIC (EYE)
Refills: 0 | Status: COMPLETED | OUTPATIENT
Start: 2021-01-01 | End: 2021-01-01

## 2021-01-01 RX ORDER — SODIUM CHLORIDE 9 MG/ML
1000 INJECTION, SOLUTION INTRAVENOUS
Refills: 0 | Status: DISCONTINUED | OUTPATIENT
Start: 2021-01-01 | End: 2021-01-01

## 2021-01-01 RX ADMIN — CYCLOPENTOLATE HYDROCHLORIDE 1 DROP(S): 10 SOLUTION/ DROPS OPHTHALMIC at 07:51

## 2021-01-01 RX ADMIN — Medication 1 DROP(S): at 07:55

## 2021-01-01 RX ADMIN — Medication 1 DROP(S): at 08:02

## 2021-01-01 RX ADMIN — Medication 1 DROP(S): at 07:51

## 2021-01-01 RX ADMIN — Medication 1 DROP(S): at 07:50

## 2021-01-01 RX ADMIN — Medication 1 DROP(S): at 08:03

## 2021-01-01 RX ADMIN — CYCLOPENTOLATE HYDROCHLORIDE 1 DROP(S): 10 SOLUTION/ DROPS OPHTHALMIC at 08:03

## 2021-01-01 RX ADMIN — CYCLOPENTOLATE HYDROCHLORIDE 1 DROP(S): 10 SOLUTION/ DROPS OPHTHALMIC at 07:54

## 2021-01-01 RX ADMIN — SODIUM CHLORIDE 40 MILLILITER(S): 9 INJECTION, SOLUTION INTRAVENOUS at 07:54

## 2021-01-01 RX ADMIN — Medication 1 DROP(S): at 07:56

## 2021-02-18 PROBLEM — I77.9 RIGHT-SIDED CAROTID ARTERY DISEASE, UNSPECIFIED TYPE: Status: ACTIVE | Noted: 2020-10-06

## 2021-02-18 NOTE — PHYSICAL EXAM
[General Appearance - Well Developed] : well developed [Normal Appearance] : normal appearance [Well Groomed] : well groomed [General Appearance - Well Nourished] : well nourished [No Deformities] : no deformities [General Appearance - In No Acute Distress] : no acute distress [Normal Oral Mucosa] : normal oral mucosa [No Oral Pallor] : no oral pallor [No Oral Cyanosis] : no oral cyanosis [Normal Jugular Venous A Waves Present] : normal jugular venous A waves present [Normal Jugular Venous V Waves Present] : normal jugular venous V waves present [No Jugular Venous Newman A Waves] : no jugular venous newman A waves [Respiration, Rhythm And Depth] : normal respiratory rhythm and effort [Exaggerated Use Of Accessory Muscles For Inspiration] : no accessory muscle use [Auscultation Breath Sounds / Voice Sounds] : lungs were clear to auscultation bilaterally [Abdomen Soft] : soft [Abdomen Tenderness] : non-tender [Abdomen Mass (___ Cm)] : no abdominal mass palpated [Abnormal Walk] : normal gait [Gait - Sufficient For Exercise Testing] : the gait was sufficient for exercise testing [Cyanosis, Localized] : no localized cyanosis [Nail Clubbing] : no clubbing of the fingernails [Petechial Hemorrhages (___cm)] : no petechial hemorrhages [Skin Color & Pigmentation] : normal skin color and pigmentation [] : no rash [No Venous Stasis] : no venous stasis [Skin Lesions] : no skin lesions [No Skin Ulcers] : no skin ulcer [No Xanthoma] : no  xanthoma was observed [Oriented To Time, Place, And Person] : oriented to person, place, and time [Affect] : the affect was normal [Mood] : the mood was normal [No Anxiety] : not feeling anxious [Normal Rate] : normal [Normal S1] : normal S1 [Normal S2] : normal S2 [S3] : no S3 [S4] : no S4 [No Murmur] : no murmurs heard [Left Carotid Bruit] : no bruit heard over the left carotid [Right Carotid Bruit] : no bruit heard over the right carotid [Right Femoral Bruit] : no bruit heard over the right femoral artery [Left Femoral Bruit] : no bruit heard over the left femoral artery [2+] : left 2+ [No Abnormalities] : the abdominal aorta was not enlarged and no bruit was heard [No Pitting Edema] : no pitting edema present

## 2021-02-18 NOTE — REASON FOR VISIT
[Follow-Up - Clinic] : a clinic follow-up of [Coronary Artery Disease] : coronary artery disease [Medication Management] : Medication management [FreeTextEntry1] : Patient returns for followup. Feeling well. Offers no complaints of chest discomfort shortness of breath palpitations dizziness or syncope.She has many questions and is interested in coming off some medications.\par

## 2021-02-18 NOTE — ASSESSMENT
[FreeTextEntry1] : Impression:\par 1. Coronary artery disease status post  non-STEMI\par 2. Cardiomyopathy improved significantly\par 3. Hypertension well controlled\par 4. Right carotid bruit with moderate [id number]%\par \par Plan:\par 1.Given the improvement in LV function and absence of heart failure since index admission, and lack of mortality benefits will discontinue furosemide\par 2. Given the fact that she remains on warfarin for an unexplained pulmonary embolus and the fact that she is more than one year from prior events will discontinue clopidogrel and place on low-dose aspirin\par 3. Will consider DOAC at next visit\par 4. All questions answered in detail to patient's satisfaction\par

## 2021-06-22 NOTE — REASON FOR VISIT
[Cardiac Failure] : cardiac failure [Coronary Artery Disease] : coronary artery disease [FreeTextEntry1] : Patient returns for followup. Feeling well. Offers no complaints of chest discomfort shortness of breath palpitations dizziness or syncope.\par

## 2021-06-22 NOTE — ASSESSMENT
[FreeTextEntry1] : Impression:\par 1. Coronary artery disease status post  non-STEMI\par 2. Cardiomyopathy improved significantly\par 3. Hypertension well controlled\par 4. Right carotid bruit with moderate [id number]%\par \par Plan:\par 1continue current regimen\par

## 2021-06-22 NOTE — PHYSICAL EXAM
[General Appearance - Well Developed] : well developed [Normal Appearance] : normal appearance [Well Groomed] : well groomed [General Appearance - Well Nourished] : well nourished [No Deformities] : no deformities [General Appearance - In No Acute Distress] : no acute distress [Normal Oral Mucosa] : normal oral mucosa [No Oral Pallor] : no oral pallor [No Oral Cyanosis] : no oral cyanosis [Normal Jugular Venous A Waves Present] : normal jugular venous A waves present [Normal Jugular Venous V Waves Present] : normal jugular venous V waves present [No Jugular Venous Newman A Waves] : no jugular venous newman A waves [Respiration, Rhythm And Depth] : normal respiratory rhythm and effort [Exaggerated Use Of Accessory Muscles For Inspiration] : no accessory muscle use [Auscultation Breath Sounds / Voice Sounds] : lungs were clear to auscultation bilaterally [Abdomen Soft] : soft [Abdomen Tenderness] : non-tender [Abdomen Mass (___ Cm)] : no abdominal mass palpated [Abnormal Walk] : normal gait [Gait - Sufficient For Exercise Testing] : the gait was sufficient for exercise testing [Cyanosis, Localized] : no localized cyanosis [Nail Clubbing] : no clubbing of the fingernails [Petechial Hemorrhages (___cm)] : no petechial hemorrhages [] : no rash [Skin Color & Pigmentation] : normal skin color and pigmentation [No Venous Stasis] : no venous stasis [Skin Lesions] : no skin lesions [No Skin Ulcers] : no skin ulcer [No Xanthoma] : no  xanthoma was observed [Oriented To Time, Place, And Person] : oriented to person, place, and time [Affect] : the affect was normal [Mood] : the mood was normal [No Anxiety] : not feeling anxious [Normal Rate] : normal [Normal S1] : normal S1 [Normal S2] : normal S2 [S3] : no S3 [S4] : no S4 [No Murmur] : no murmurs heard [Right Carotid Bruit] : no bruit heard over the right carotid [Left Carotid Bruit] : no bruit heard over the left carotid [Right Femoral Bruit] : no bruit heard over the right femoral artery [Left Femoral Bruit] : no bruit heard over the left femoral artery [2+] : left 2+ [No Abnormalities] : the abdominal aorta was not enlarged and no bruit was heard [No Pitting Edema] : no pitting edema present

## 2021-10-21 PROBLEM — E87.5 HYPERKALEMIA: Status: ACTIVE | Noted: 2021-01-01

## 2021-10-21 NOTE — ASSESSMENT
[FreeTextEntry1] : \par Impression:\par 1. Hyperkalemia chronic etiology uncertain patient with normal BUN and creatinine rate of rise slow and no evidence of hemodynamic compromise\par 2. Hypertension today poorly controlled. Patient states she is having significant stress at home, over her upcoming cataract and now over this development\par \par Plan:\par 1. Patient to have renal profile repeated along with plasma potassium and aldosterone level\par  2. Will need renal referral

## 2021-10-21 NOTE — REASON FOR VISIT
[Symptom and Test Evaluation] : symptom and test evaluation [FreeTextEntry1] : Asked by me to come in to a recent nonhemolyzed potassium specimen is 6.1. Patient is entirely asymptomatic she denies chest discomfort shortness of breath palpitations dizziness fatigue or syncope. Of note is the fact that her potassiums have been always over 5 and when she was on ACE inhibitor several years ago was as high as 5.8. Patient no longer sees her primary physician.

## 2021-10-21 NOTE — PHYSICAL EXAM
[General Appearance - Well Developed] : well developed [Normal Appearance] : normal appearance [Well Groomed] : well groomed [General Appearance - Well Nourished] : well nourished [No Deformities] : no deformities [General Appearance - In No Acute Distress] : no acute distress [Normal Oral Mucosa] : normal oral mucosa [No Oral Pallor] : no oral pallor [No Oral Cyanosis] : no oral cyanosis [Normal Jugular Venous A Waves Present] : normal jugular venous A waves present [Normal Jugular Venous V Waves Present] : normal jugular venous V waves present [No Jugular Venous Newman A Waves] : no jugular venous newman A waves [Respiration, Rhythm And Depth] : normal respiratory rhythm and effort [Exaggerated Use Of Accessory Muscles For Inspiration] : no accessory muscle use [Auscultation Breath Sounds / Voice Sounds] : lungs were clear to auscultation bilaterally [Abdomen Soft] : soft [Abdomen Tenderness] : non-tender [Abdomen Mass (___ Cm)] : no abdominal mass palpated [Abnormal Walk] : normal gait [Gait - Sufficient For Exercise Testing] : the gait was sufficient for exercise testing [Nail Clubbing] : no clubbing of the fingernails [Cyanosis, Localized] : no localized cyanosis [Petechial Hemorrhages (___cm)] : no petechial hemorrhages [] : no rash [Skin Color & Pigmentation] : normal skin color and pigmentation [No Venous Stasis] : no venous stasis [Skin Lesions] : no skin lesions [No Skin Ulcers] : no skin ulcer [No Xanthoma] : no  xanthoma was observed [Oriented To Time, Place, And Person] : oriented to person, place, and time [Affect] : the affect was normal [Mood] : the mood was normal [No Anxiety] : not feeling anxious [Normal Rate] : normal [Normal S1] : normal S1 [Normal S2] : normal S2 [S3] : no S3 [S4] : no S4 [No Murmur] : no murmurs heard [Right Carotid Bruit] : no bruit heard over the right carotid [Left Carotid Bruit] : no bruit heard over the left carotid [Right Femoral Bruit] : no bruit heard over the right femoral artery [Left Femoral Bruit] : no bruit heard over the left femoral artery [2+] : left 2+ [No Abnormalities] : the abdominal aorta was not enlarged and no bruit was heard [No Pitting Edema] : no pitting edema present

## 2021-10-26 PROBLEM — I42.9 CARDIOMYOPATHY: Status: ACTIVE | Noted: 2019-07-17

## 2021-10-26 PROBLEM — Z95.5 S/P CORONARY ARTERY STENT PLACEMENT: Status: ACTIVE | Noted: 2019-07-18

## 2021-10-26 PROBLEM — I25.10 ARTERIOSCLEROSIS OF CORONARY ARTERY: Status: ACTIVE | Noted: 2019-07-18

## 2021-10-26 NOTE — ASSESSMENT
[FreeTextEntry1] : \par Impression:\par 1. Hyperkalemia resolved\par 2. Hypertension well controlled today\par 3.cad symptom free\par 4. LV systolic dysfunction symptom free. Patient unable to tolerate ACE/arb  or entresto due to persistent hyperkalemia in the past\par \par \par Plan:\par 1. Patient to have renal profile repeated along with plasma potassium and aldosterone level\par  2. Will need renal referral

## 2021-11-30 NOTE — ASU PATIENT PROFILE, ADULT - NSICDXPASTSURGICALHX_GEN_ALL_CORE_FT
PAST SURGICAL HISTORY:  No significant past surgical history      PAST SURGICAL HISTORY:  Coronary stent patent

## 2021-11-30 NOTE — ASU PATIENT PROFILE, ADULT - FALL HARM RISK - UNIVERSAL INTERVENTIONS
Bed in lowest position, wheels locked, appropriate side rails in place/Call bell, personal items and telephone in reach/Instruct patient to call for assistance before getting out of bed or chair/Non-slip footwear when patient is out of bed/[place] to call system/Physically safe environment - no spills, clutter or unnecessary equipment/Purposeful Proactive Rounding/Room/bathroom lighting operational, light cord in reach

## 2021-11-30 NOTE — ASU PATIENT PROFILE, ADULT - NSICDXPASTMEDICALHX_GEN_ALL_CORE_FT
PAST MEDICAL HISTORY:  NSTEMI (non-ST elevated myocardial infarction)     Pulmonary embolism     Systolic HF (heart failure)

## 2021-12-01 NOTE — ASU DISCHARGE PLAN (ADULT/PEDIATRIC) - ASU DC SPECIAL INSTRUCTIONSFT
Keep shield on eye until office visit today at 2 pm  Any problems call office at [phone]    OFFICE VISIT TODAY AT 2 pm  Bring Drops

## 2021-12-01 NOTE — ASU DISCHARGE PLAN (ADULT/PEDIATRIC) - NS MD DC FALL RISK RISK
For information on Fall & Injury Prevention, visit: [email or web address] OR  [email or web address] OR  https://www.cdc.gov/steadi/patient.html

## 2021-12-01 NOTE — BRIEF OPERATIVE NOTE - NSICDXBRIEFPROCEDURE_GEN_ALL_CORE_FT
PROCEDURES:  Single stage extracapsular removal of cataract with insertion of intraocular lens prosthesis by phacoemulsification 01-Dec-2021 09:19:49  Saul Soliz

## 2021-12-01 NOTE — ASU PREOP CHECKLIST - WEIGHT IN LBS
Called Ana at [facility], phone # [phone] and explained OK for OT as below.  She verbalizes understanding and indicates they are attempting to have 2 visits with pt this week but may only get 1 due to time constraints.  Nothing additional needed at this time.  FRANCIA   145.9

## 2022-01-01 ENCOUNTER — INPATIENT (INPATIENT)
Facility: HOSPITAL | Age: 82
LOS: 0 days | DRG: 64 | End: 2022-01-15
Attending: INTERNAL MEDICINE | Admitting: INTERNAL MEDICINE
Payer: MEDICARE

## 2022-01-01 VITALS
RESPIRATION RATE: 23 BRPM | TEMPERATURE: 98 F | OXYGEN SATURATION: 90 % | HEART RATE: 87 BPM | WEIGHT: 160.06 LBS | SYSTOLIC BLOOD PRESSURE: 166 MMHG | HEIGHT: 67 IN | DIASTOLIC BLOOD PRESSURE: 61 MMHG

## 2022-01-01 VITALS — RESPIRATION RATE: 29 BRPM | HEART RATE: 36 BPM

## 2022-01-01 DIAGNOSIS — Z95.5 PRESENCE OF CORONARY ANGIOPLASTY IMPLANT AND GRAFT: Chronic | ICD-10-CM

## 2022-01-01 DIAGNOSIS — I61.9 NONTRAUMATIC INTRACEREBRAL HEMORRHAGE, UNSPECIFIED: ICD-10-CM

## 2022-01-01 LAB
ALBUMIN SERPL ELPH-MCNC: 3.2 G/DL — LOW (ref 3.3–5)
ALP SERPL-CCNC: 182 U/L — HIGH (ref 40–120)
ALT FLD-CCNC: 33 U/L — SIGNIFICANT CHANGE UP (ref 10–45)
ANION GAP SERPL CALC-SCNC: 10 MMOL/L — SIGNIFICANT CHANGE UP (ref 5–17)
ANION GAP SERPL CALC-SCNC: 11 MMOL/L — SIGNIFICANT CHANGE UP (ref 5–17)
ANION GAP SERPL CALC-SCNC: 12 MMOL/L — SIGNIFICANT CHANGE UP (ref 5–17)
ANION GAP SERPL CALC-SCNC: 9 MMOL/L — SIGNIFICANT CHANGE UP (ref 5–17)
APPEARANCE UR: ABNORMAL
APTT BLD: 38 SEC — HIGH (ref 27.5–35.5)
APTT BLD: 47.2 SEC — HIGH (ref 27.5–35.5)
APTT BLD: 80.9 SEC — HIGH (ref 27.5–35.5)
AST SERPL-CCNC: 55 U/L — HIGH (ref 10–40)
BACTERIA # UR AUTO: ABNORMAL /HPF
BASE EXCESS BLDA CALC-SCNC: 0.1 MMOL/L — SIGNIFICANT CHANGE UP (ref -2–3)
BASE EXCESS BLDA CALC-SCNC: 2.5 MMOL/L — SIGNIFICANT CHANGE UP (ref -2–3)
BASOPHILS # BLD AUTO: 0.03 K/UL — SIGNIFICANT CHANGE UP (ref 0–0.2)
BASOPHILS # BLD AUTO: 0.08 K/UL — SIGNIFICANT CHANGE UP (ref 0–0.2)
BASOPHILS # BLD AUTO: 0.09 K/UL — SIGNIFICANT CHANGE UP (ref 0–0.2)
BASOPHILS NFR BLD AUTO: 0.2 % — SIGNIFICANT CHANGE UP (ref 0–2)
BASOPHILS NFR BLD AUTO: 0.3 % — SIGNIFICANT CHANGE UP (ref 0–2)
BASOPHILS NFR BLD AUTO: 0.4 % — SIGNIFICANT CHANGE UP (ref 0–2)
BILIRUB SERPL-MCNC: 0.7 MG/DL — SIGNIFICANT CHANGE UP (ref 0.2–1.2)
BILIRUB UR-MCNC: NEGATIVE — SIGNIFICANT CHANGE UP
BLD GP AB SCN SERPL QL: SIGNIFICANT CHANGE UP
BUN SERPL-MCNC: 11 MG/DL — SIGNIFICANT CHANGE UP (ref 7–23)
BUN SERPL-MCNC: 11 MG/DL — SIGNIFICANT CHANGE UP (ref 7–23)
BUN SERPL-MCNC: 12 MG/DL — SIGNIFICANT CHANGE UP (ref 7–23)
BUN SERPL-MCNC: 13 MG/DL — SIGNIFICANT CHANGE UP (ref 7–23)
CALCIUM SERPL-MCNC: 8.4 MG/DL — SIGNIFICANT CHANGE UP (ref 8.4–10.5)
CALCIUM SERPL-MCNC: 8.7 MG/DL — SIGNIFICANT CHANGE UP (ref 8.4–10.5)
CALCIUM SERPL-MCNC: 8.9 MG/DL — SIGNIFICANT CHANGE UP (ref 8.4–10.5)
CALCIUM SERPL-MCNC: 9.8 MG/DL — SIGNIFICANT CHANGE UP (ref 8.4–10.5)
CHLORIDE SERPL-SCNC: 101 MMOL/L — SIGNIFICANT CHANGE UP (ref 96–108)
CHLORIDE SERPL-SCNC: 111 MMOL/L — HIGH (ref 96–108)
CHLORIDE SERPL-SCNC: 117 MMOL/L — HIGH (ref 96–108)
CHLORIDE SERPL-SCNC: 126 MMOL/L — HIGH (ref 96–108)
CO2 BLDA-SCNC: 26 MMOL/L — HIGH (ref 19–24)
CO2 BLDA-SCNC: 29 MMOL/L — HIGH (ref 19–24)
CO2 SERPL-SCNC: 24 MMOL/L — SIGNIFICANT CHANGE UP (ref 22–31)
CO2 SERPL-SCNC: 24 MMOL/L — SIGNIFICANT CHANGE UP (ref 22–31)
CO2 SERPL-SCNC: 26 MMOL/L — SIGNIFICANT CHANGE UP (ref 22–31)
CO2 SERPL-SCNC: 27 MMOL/L — SIGNIFICANT CHANGE UP (ref 22–31)
COLOR SPEC: YELLOW — SIGNIFICANT CHANGE UP
CREAT SERPL-MCNC: 0.7 MG/DL — SIGNIFICANT CHANGE UP (ref 0.5–1.3)
CREAT SERPL-MCNC: 0.72 MG/DL — SIGNIFICANT CHANGE UP (ref 0.5–1.3)
CREAT SERPL-MCNC: 0.78 MG/DL — SIGNIFICANT CHANGE UP (ref 0.5–1.3)
CREAT SERPL-MCNC: 0.86 MG/DL — SIGNIFICANT CHANGE UP (ref 0.5–1.3)
DIFF PNL FLD: ABNORMAL
EOSINOPHIL # BLD AUTO: 0 K/UL — SIGNIFICANT CHANGE UP (ref 0–0.5)
EOSINOPHIL # BLD AUTO: 0.05 K/UL — SIGNIFICANT CHANGE UP (ref 0–0.5)
EOSINOPHIL # BLD AUTO: 0.08 K/UL — SIGNIFICANT CHANGE UP (ref 0–0.5)
EOSINOPHIL NFR BLD AUTO: 0 % — SIGNIFICANT CHANGE UP (ref 0–6)
EOSINOPHIL NFR BLD AUTO: 0.2 % — SIGNIFICANT CHANGE UP (ref 0–6)
EOSINOPHIL NFR BLD AUTO: 0.4 % — SIGNIFICANT CHANGE UP (ref 0–6)
EPI CELLS # UR: SIGNIFICANT CHANGE UP
GAS PNL BLDA: SIGNIFICANT CHANGE UP
GAS PNL BLDA: SIGNIFICANT CHANGE UP
GLUCOSE BLDC GLUCOMTR-MCNC: 156 MG/DL — HIGH (ref 70–99)
GLUCOSE BLDC GLUCOMTR-MCNC: 451 MG/DL — CRITICAL HIGH (ref 70–99)
GLUCOSE BLDC GLUCOMTR-MCNC: 464 MG/DL — CRITICAL HIGH (ref 70–99)
GLUCOSE SERPL-MCNC: 144 MG/DL — HIGH (ref 70–99)
GLUCOSE SERPL-MCNC: 180 MG/DL — HIGH (ref 70–99)
GLUCOSE SERPL-MCNC: 188 MG/DL — HIGH (ref 70–99)
GLUCOSE SERPL-MCNC: 560 MG/DL — CRITICAL HIGH (ref 70–99)
GLUCOSE UR QL: NEGATIVE — SIGNIFICANT CHANGE UP
HCO3 BLDA-SCNC: 25 MMOL/L — SIGNIFICANT CHANGE UP (ref 21–28)
HCO3 BLDA-SCNC: 27 MMOL/L — SIGNIFICANT CHANGE UP (ref 21–28)
HCT VFR BLD CALC: 36.8 % — SIGNIFICANT CHANGE UP (ref 34.5–45)
HCT VFR BLD CALC: 40.9 % — SIGNIFICANT CHANGE UP (ref 34.5–45)
HCT VFR BLD CALC: 46.7 % — HIGH (ref 34.5–45)
HGB BLD-MCNC: 12 G/DL — SIGNIFICANT CHANGE UP (ref 11.5–15.5)
HGB BLD-MCNC: 13.4 G/DL — SIGNIFICANT CHANGE UP (ref 11.5–15.5)
HGB BLD-MCNC: 15.1 G/DL — SIGNIFICANT CHANGE UP (ref 11.5–15.5)
HOROWITZ INDEX BLDA+IHG-RTO: 100 — SIGNIFICANT CHANGE UP
HOROWITZ INDEX BLDA+IHG-RTO: 40 — SIGNIFICANT CHANGE UP
IMM GRANULOCYTES NFR BLD AUTO: 0.6 % — SIGNIFICANT CHANGE UP (ref 0–1.5)
IMM GRANULOCYTES NFR BLD AUTO: 0.8 % — SIGNIFICANT CHANGE UP (ref 0–1.5)
IMM GRANULOCYTES NFR BLD AUTO: 0.9 % — SIGNIFICANT CHANGE UP (ref 0–1.5)
INR BLD: 1.35 RATIO — HIGH (ref 0.88–1.16)
INR BLD: 3 RATIO — HIGH (ref 0.88–1.16)
INR BLD: 5.24 RATIO — CRITICAL HIGH (ref 0.88–1.16)
KETONES UR-MCNC: ABNORMAL
LACTATE SERPL-SCNC: 2.5 MMOL/L — HIGH (ref 0.7–2)
LEUKOCYTE ESTERASE UR-ACNC: NEGATIVE — SIGNIFICANT CHANGE UP
LYMPHOCYTES # BLD AUTO: 0.6 K/UL — LOW (ref 1–3.3)
LYMPHOCYTES # BLD AUTO: 0.79 K/UL — LOW (ref 1–3.3)
LYMPHOCYTES # BLD AUTO: 0.98 K/UL — LOW (ref 1–3.3)
LYMPHOCYTES # BLD AUTO: 3.5 % — LOW (ref 13–44)
LYMPHOCYTES # BLD AUTO: 3.7 % — LOW (ref 13–44)
LYMPHOCYTES # BLD AUTO: 3.9 % — LOW (ref 13–44)
MAGNESIUM SERPL-MCNC: 2.1 MG/DL — SIGNIFICANT CHANGE UP (ref 1.6–2.6)
MCHC RBC-ENTMCNC: 31.5 PG — SIGNIFICANT CHANGE UP (ref 27–34)
MCHC RBC-ENTMCNC: 31.7 PG — SIGNIFICANT CHANGE UP (ref 27–34)
MCHC RBC-ENTMCNC: 32 PG — SIGNIFICANT CHANGE UP (ref 27–34)
MCHC RBC-ENTMCNC: 32.3 GM/DL — SIGNIFICANT CHANGE UP (ref 32–36)
MCHC RBC-ENTMCNC: 32.6 GM/DL — SIGNIFICANT CHANGE UP (ref 32–36)
MCHC RBC-ENTMCNC: 32.8 GM/DL — SIGNIFICANT CHANGE UP (ref 32–36)
MCV RBC AUTO: 96.7 FL — SIGNIFICANT CHANGE UP (ref 80–100)
MCV RBC AUTO: 97.5 FL — SIGNIFICANT CHANGE UP (ref 80–100)
MCV RBC AUTO: 98.1 FL — SIGNIFICANT CHANGE UP (ref 80–100)
MONOCYTES # BLD AUTO: 0.38 K/UL — SIGNIFICANT CHANGE UP (ref 0–0.9)
MONOCYTES # BLD AUTO: 1.31 K/UL — HIGH (ref 0–0.9)
MONOCYTES # BLD AUTO: 1.42 K/UL — HIGH (ref 0–0.9)
MONOCYTES NFR BLD AUTO: 2.2 % — SIGNIFICANT CHANGE UP (ref 2–14)
MONOCYTES NFR BLD AUTO: 5.7 % — SIGNIFICANT CHANGE UP (ref 2–14)
MONOCYTES NFR BLD AUTO: 6.2 % — SIGNIFICANT CHANGE UP (ref 2–14)
NEUTROPHILS # BLD AUTO: 16.1 K/UL — HIGH (ref 1.8–7.4)
NEUTROPHILS # BLD AUTO: 18.84 K/UL — HIGH (ref 1.8–7.4)
NEUTROPHILS # BLD AUTO: 22.3 K/UL — HIGH (ref 1.8–7.4)
NEUTROPHILS NFR BLD AUTO: 88.5 % — HIGH (ref 43–77)
NEUTROPHILS NFR BLD AUTO: 89 % — HIGH (ref 43–77)
NEUTROPHILS NFR BLD AUTO: 93.5 % — HIGH (ref 43–77)
NITRITE UR-MCNC: POSITIVE
NRBC # BLD: 0 /100 WBCS — SIGNIFICANT CHANGE UP (ref 0–0)
OB PNL STL: NEGATIVE — SIGNIFICANT CHANGE UP
PCO2 BLDA: 38 MMHG — HIGH (ref 32–35)
PCO2 BLDA: 44 MMHG — HIGH (ref 32–35)
PH BLDA: 7.4 — SIGNIFICANT CHANGE UP (ref 7.35–7.45)
PH BLDA: 7.42 — SIGNIFICANT CHANGE UP (ref 7.35–7.45)
PH UR: 6.5 — SIGNIFICANT CHANGE UP (ref 5–8)
PHOSPHATE SERPL-MCNC: 1.4 MG/DL — LOW (ref 2.5–4.5)
PLATELET # BLD AUTO: 277 K/UL — SIGNIFICANT CHANGE UP (ref 150–400)
PLATELET # BLD AUTO: 327 K/UL — SIGNIFICANT CHANGE UP (ref 150–400)
PLATELET # BLD AUTO: 330 K/UL — SIGNIFICANT CHANGE UP (ref 150–400)
PO2 BLDA: 158 MMHG — HIGH (ref 83–108)
PO2 BLDA: 424 MMHG — HIGH (ref 83–108)
POTASSIUM SERPL-MCNC: 2.8 MMOL/L — CRITICAL LOW (ref 3.5–5.3)
POTASSIUM SERPL-MCNC: 3.1 MMOL/L — LOW (ref 3.5–5.3)
POTASSIUM SERPL-MCNC: 3.6 MMOL/L — SIGNIFICANT CHANGE UP (ref 3.5–5.3)
POTASSIUM SERPL-MCNC: 3.9 MMOL/L — SIGNIFICANT CHANGE UP (ref 3.5–5.3)
POTASSIUM SERPL-SCNC: 2.8 MMOL/L — CRITICAL LOW (ref 3.5–5.3)
POTASSIUM SERPL-SCNC: 3.1 MMOL/L — LOW (ref 3.5–5.3)
POTASSIUM SERPL-SCNC: 3.6 MMOL/L — SIGNIFICANT CHANGE UP (ref 3.5–5.3)
POTASSIUM SERPL-SCNC: 3.9 MMOL/L — SIGNIFICANT CHANGE UP (ref 3.5–5.3)
PROCALCITONIN SERPL-MCNC: 0.08 NG/ML — SIGNIFICANT CHANGE UP
PROT SERPL-MCNC: 6.8 G/DL — SIGNIFICANT CHANGE UP (ref 6–8.3)
PROT UR-MCNC: NEGATIVE — SIGNIFICANT CHANGE UP
PROTHROM AB SERPL-ACNC: 16.1 SEC — HIGH (ref 10.6–13.6)
PROTHROM AB SERPL-ACNC: 34.5 SEC — HIGH (ref 10.6–13.6)
PROTHROM AB SERPL-ACNC: 58.6 SEC — HIGH (ref 10.6–13.6)
RBC # BLD: 3.75 M/UL — LOW (ref 3.8–5.2)
RBC # BLD: 4.23 M/UL — SIGNIFICANT CHANGE UP (ref 3.8–5.2)
RBC # BLD: 4.79 M/UL — SIGNIFICANT CHANGE UP (ref 3.8–5.2)
RBC # FLD: 13.2 % — SIGNIFICANT CHANGE UP (ref 10.3–14.5)
RBC # FLD: 13.3 % — SIGNIFICANT CHANGE UP (ref 10.3–14.5)
RBC # FLD: 13.4 % — SIGNIFICANT CHANGE UP (ref 10.3–14.5)
RBC CASTS # UR COMP ASSIST: SIGNIFICANT CHANGE UP /HPF (ref 0–4)
SAO2 % BLDA: 99.5 % — HIGH (ref 94–98)
SAO2 % BLDA: 99.9 % — HIGH (ref 94–98)
SARS-COV-2 RNA SPEC QL NAA+PROBE: DETECTED
SODIUM SERPL-SCNC: 139 MMOL/L — SIGNIFICANT CHANGE UP (ref 135–145)
SODIUM SERPL-SCNC: 146 MMOL/L — HIGH (ref 135–145)
SODIUM SERPL-SCNC: 151 MMOL/L — HIGH (ref 135–145)
SODIUM SERPL-SCNC: 162 MMOL/L — CRITICAL HIGH (ref 135–145)
SP GR SPEC: 1.01 — SIGNIFICANT CHANGE UP (ref 1.01–1.02)
TROPONIN I, HIGH SENSITIVITY RESULT: 198.8 NG/L — HIGH
UROBILINOGEN FLD QL: NEGATIVE — SIGNIFICANT CHANGE UP
WBC # BLD: 17.22 K/UL — HIGH (ref 3.8–10.5)
WBC # BLD: 21.27 K/UL — HIGH (ref 3.8–10.5)
WBC # BLD: 25.06 K/UL — HIGH (ref 3.8–10.5)
WBC # FLD AUTO: 17.22 K/UL — HIGH (ref 3.8–10.5)
WBC # FLD AUTO: 21.27 K/UL — HIGH (ref 3.8–10.5)
WBC # FLD AUTO: 25.06 K/UL — HIGH (ref 3.8–10.5)
WBC UR QL: SIGNIFICANT CHANGE UP /HPF (ref 0–5)

## 2022-01-01 PROCEDURE — 99291 CRITICAL CARE FIRST HOUR: CPT

## 2022-01-01 PROCEDURE — 71045 X-RAY EXAM CHEST 1 VIEW: CPT | Mod: 26

## 2022-01-01 PROCEDURE — 70450 CT HEAD/BRAIN W/O DYE: CPT | Mod: 26,MA

## 2022-01-01 RX ORDER — PIPERACILLIN AND TAZOBACTAM 4; .5 G/20ML; G/20ML
3.38 INJECTION, POWDER, LYOPHILIZED, FOR SOLUTION INTRAVENOUS ONCE
Refills: 0 | Status: COMPLETED | OUTPATIENT
Start: 2022-01-01 | End: 2022-01-01

## 2022-01-01 RX ORDER — DEXAMETHASONE 0.5 MG/5ML
10 ELIXIR ORAL ONCE
Refills: 0 | Status: COMPLETED | OUTPATIENT
Start: 2022-01-01 | End: 2022-01-01

## 2022-01-01 RX ORDER — LEVETIRACETAM 250 MG/1
500 TABLET, FILM COATED ORAL EVERY 12 HOURS
Refills: 0 | Status: DISCONTINUED | OUTPATIENT
Start: 2022-01-01 | End: 2022-01-01

## 2022-01-01 RX ORDER — PHENYLEPHRINE HYDROCHLORIDE 10 MG/ML
0.5 INJECTION INTRAVENOUS
Qty: 40 | Refills: 0 | Status: DISCONTINUED | OUTPATIENT
Start: 2022-01-01 | End: 2022-01-01

## 2022-01-01 RX ORDER — POTASSIUM CHLORIDE 20 MEQ
10 PACKET (EA) ORAL
Refills: 0 | Status: COMPLETED | OUTPATIENT
Start: 2022-01-01 | End: 2022-01-01

## 2022-01-01 RX ORDER — SODIUM CHLORIDE 9 MG/ML
1000 INJECTION, SOLUTION INTRAVENOUS
Refills: 0 | Status: DISCONTINUED | OUTPATIENT
Start: 2022-01-01 | End: 2022-01-01

## 2022-01-01 RX ORDER — INSULIN HUMAN 100 [IU]/ML
10 INJECTION, SOLUTION SUBCUTANEOUS ONCE
Refills: 0 | Status: COMPLETED | OUTPATIENT
Start: 2022-01-01 | End: 2022-01-01

## 2022-01-01 RX ORDER — CHLORHEXIDINE GLUCONATE 213 G/1000ML
1 SOLUTION TOPICAL
Refills: 0 | Status: DISCONTINUED | OUTPATIENT
Start: 2022-01-01 | End: 2022-01-01

## 2022-01-01 RX ORDER — SODIUM CHLORIDE 9 MG/ML
1000 INJECTION INTRAMUSCULAR; INTRAVENOUS; SUBCUTANEOUS ONCE
Refills: 0 | Status: DISCONTINUED | OUTPATIENT
Start: 2022-01-01 | End: 2022-01-01

## 2022-01-01 RX ORDER — PROTHROMBIN COMPLEX CONCENTRATE (HUMAN) 25.5; 16.5; 24; 22; 22; 26 [IU]/ML; [IU]/ML; [IU]/ML; [IU]/ML; [IU]/ML; [IU]/ML
1000 POWDER, FOR SOLUTION INTRAVENOUS ONCE
Refills: 0 | Status: COMPLETED | OUTPATIENT
Start: 2022-01-01 | End: 2022-01-01

## 2022-01-01 RX ORDER — SODIUM CHLORIDE 9 MG/ML
2300 INJECTION INTRAMUSCULAR; INTRAVENOUS; SUBCUTANEOUS ONCE
Refills: 0 | Status: COMPLETED | OUTPATIENT
Start: 2022-01-01 | End: 2022-01-01

## 2022-01-01 RX ORDER — PROPOFOL 10 MG/ML
3 INJECTION, EMULSION INTRAVENOUS
Qty: 500 | Refills: 0 | Status: DISCONTINUED | OUTPATIENT
Start: 2022-01-01 | End: 2022-01-01

## 2022-01-01 RX ORDER — SODIUM CHLORIDE 9 MG/ML
1000 INJECTION INTRAMUSCULAR; INTRAVENOUS; SUBCUTANEOUS
Refills: 0 | Status: DISCONTINUED | OUTPATIENT
Start: 2022-01-01 | End: 2022-01-01

## 2022-01-01 RX ORDER — PIPERACILLIN AND TAZOBACTAM 4; .5 G/20ML; G/20ML
3.38 INJECTION, POWDER, LYOPHILIZED, FOR SOLUTION INTRAVENOUS EVERY 8 HOURS
Refills: 0 | Status: DISCONTINUED | OUTPATIENT
Start: 2022-01-01 | End: 2022-01-01

## 2022-01-01 RX ORDER — NOREPINEPHRINE BITARTRATE/D5W 8 MG/250ML
0.03 PLASTIC BAG, INJECTION (ML) INTRAVENOUS
Qty: 8 | Refills: 0 | Status: DISCONTINUED | OUTPATIENT
Start: 2022-01-01 | End: 2022-01-01

## 2022-01-01 RX ORDER — DESMOPRESSIN ACETATE 0.1 MG/1
20 TABLET ORAL ONCE
Refills: 0 | Status: COMPLETED | OUTPATIENT
Start: 2022-01-01 | End: 2022-01-01

## 2022-01-01 RX ORDER — CEFTRIAXONE 500 MG/1
1000 INJECTION, POWDER, FOR SOLUTION INTRAMUSCULAR; INTRAVENOUS ONCE
Refills: 0 | Status: COMPLETED | OUTPATIENT
Start: 2022-01-01 | End: 2022-01-01

## 2022-01-01 RX ORDER — INFLUENZA VIRUS VACCINE 15; 15; 15; 15 UG/.5ML; UG/.5ML; UG/.5ML; UG/.5ML
0.7 SUSPENSION INTRAMUSCULAR ONCE
Refills: 0 | Status: DISCONTINUED | OUTPATIENT
Start: 2022-01-01 | End: 2022-01-01

## 2022-01-01 RX ORDER — POTASSIUM CHLORIDE 20 MEQ
10 PACKET (EA) ORAL
Refills: 0 | Status: DISCONTINUED | OUTPATIENT
Start: 2022-01-01 | End: 2022-01-01

## 2022-01-01 RX ORDER — POTASSIUM PHOSPHATE, MONOBASIC POTASSIUM PHOSPHATE, DIBASIC 236; 224 MG/ML; MG/ML
30 INJECTION, SOLUTION INTRAVENOUS ONCE
Refills: 0 | Status: COMPLETED | OUTPATIENT
Start: 2022-01-01 | End: 2022-01-01

## 2022-01-01 RX ORDER — CHLORHEXIDINE GLUCONATE 213 G/1000ML
15 SOLUTION TOPICAL EVERY 12 HOURS
Refills: 0 | Status: DISCONTINUED | OUTPATIENT
Start: 2022-01-01 | End: 2022-01-01

## 2022-01-01 RX ORDER — SODIUM CHLORIDE 9 MG/ML
500 INJECTION, SOLUTION INTRAVENOUS ONCE
Refills: 0 | Status: COMPLETED | OUTPATIENT
Start: 2022-01-01 | End: 2022-01-01

## 2022-01-01 RX ADMIN — Medication 100 MILLIEQUIVALENT(S): at 16:13

## 2022-01-01 RX ADMIN — SODIUM CHLORIDE 2300 MILLILITER(S): 9 INJECTION INTRAMUSCULAR; INTRAVENOUS; SUBCUTANEOUS at 15:45

## 2022-01-01 RX ADMIN — POTASSIUM PHOSPHATE, MONOBASIC POTASSIUM PHOSPHATE, DIBASIC 83.33 MILLIMOLE(S): 236; 224 INJECTION, SOLUTION INTRAVENOUS at 16:13

## 2022-01-01 RX ADMIN — SODIUM CHLORIDE 100 MILLILITER(S): 9 INJECTION INTRAMUSCULAR; INTRAVENOUS; SUBCUTANEOUS at 19:25

## 2022-01-01 RX ADMIN — SODIUM CHLORIDE 1000 MILLILITER(S): 9 INJECTION, SOLUTION INTRAVENOUS at 09:37

## 2022-01-01 RX ADMIN — Medication 100 MILLIEQUIVALENT(S): at 16:43

## 2022-01-01 RX ADMIN — Medication 10 MILLIGRAM(S): at 18:08

## 2022-01-01 RX ADMIN — PROTHROMBIN COMPLEX CONCENTRATE (HUMAN) 400 INTERNATIONAL UNIT(S): 25.5; 16.5; 24; 22; 22; 26 POWDER, FOR SOLUTION INTRAVENOUS at 18:03

## 2022-01-01 RX ADMIN — SODIUM CHLORIDE 200 MILLILITER(S): 9 INJECTION, SOLUTION INTRAVENOUS at 11:55

## 2022-01-01 RX ADMIN — PHENYLEPHRINE HYDROCHLORIDE 13.6 MICROGRAM(S)/KG/MIN: 10 INJECTION INTRAVENOUS at 17:07

## 2022-01-01 RX ADMIN — Medication 100 MILLIEQUIVALENT(S): at 09:37

## 2022-01-01 RX ADMIN — PHENYLEPHRINE HYDROCHLORIDE 13.6 MICROGRAM(S)/KG/MIN: 10 INJECTION INTRAVENOUS at 10:50

## 2022-01-01 RX ADMIN — LEVETIRACETAM 400 MILLIGRAM(S): 250 TABLET, FILM COATED ORAL at 13:18

## 2022-01-01 RX ADMIN — Medication 100 MILLIEQUIVALENT(S): at 10:40

## 2022-01-01 RX ADMIN — SODIUM CHLORIDE 100 MILLILITER(S): 9 INJECTION, SOLUTION INTRAVENOUS at 09:37

## 2022-01-01 RX ADMIN — PROPOFOL 1.31 MICROGRAM(S)/KG/MIN: 10 INJECTION, EMULSION INTRAVENOUS at 14:41

## 2022-01-01 RX ADMIN — PIPERACILLIN AND TAZOBACTAM 25 GRAM(S): 4; .5 INJECTION, POWDER, LYOPHILIZED, FOR SOLUTION INTRAVENOUS at 13:19

## 2022-01-01 RX ADMIN — SODIUM CHLORIDE 1000 MILLILITER(S): 9 INJECTION, SOLUTION INTRAVENOUS at 11:55

## 2022-01-01 RX ADMIN — Medication 100 MILLIEQUIVALENT(S): at 09:08

## 2022-01-01 RX ADMIN — CEFTRIAXONE 1000 MILLIGRAM(S): 500 INJECTION, POWDER, FOR SOLUTION INTRAMUSCULAR; INTRAVENOUS at 17:08

## 2022-01-01 RX ADMIN — DESMOPRESSIN ACETATE 220 MICROGRAM(S): 0.1 TABLET ORAL at 09:37

## 2022-01-01 RX ADMIN — CHLORHEXIDINE GLUCONATE 1 APPLICATION(S): 213 SOLUTION TOPICAL at 05:44

## 2022-01-01 RX ADMIN — PIPERACILLIN AND TAZOBACTAM 25 GRAM(S): 4; .5 INJECTION, POWDER, LYOPHILIZED, FOR SOLUTION INTRAVENOUS at 21:24

## 2022-01-01 RX ADMIN — PIPERACILLIN AND TAZOBACTAM 25 GRAM(S): 4; .5 INJECTION, POWDER, LYOPHILIZED, FOR SOLUTION INTRAVENOUS at 05:41

## 2022-01-01 RX ADMIN — CEFTRIAXONE 100 MILLIGRAM(S): 500 INJECTION, POWDER, FOR SOLUTION INTRAMUSCULAR; INTRAVENOUS at 16:25

## 2022-01-01 RX ADMIN — SODIUM CHLORIDE 2300 MILLILITER(S): 9 INJECTION INTRAMUSCULAR; INTRAVENOUS; SUBCUTANEOUS at 17:08

## 2022-01-01 RX ADMIN — INSULIN HUMAN 10 UNIT(S): 100 INJECTION, SOLUTION SUBCUTANEOUS at 16:13

## 2022-01-14 NOTE — PROGRESS NOTE ADULT - SUBJECTIVE AND OBJECTIVE BOX
Patient is a 81y old  Female who presents with a chief complaint of Unresponsiveness, Intracranial hemorrhage (2022 19:35)      BRIEF HOSPITAL COURSE:     81F w/ afib on coumadin, found down on floor at home by daughter. Intubated en route for AMS. In ED TC head showed large ICH. ALso noted to be COVID-19 (+)  -she was given Kcentra    Events last 24 hours:  -patient with NO MENTAL status, signs of herniation (cushings triad intermittently hypertensive, irregular resp. and intermittent bradycardia)  -she had required pressors on arrival, however now they are OFF  -son is (per report) [place] he is aware of severely poor prognosis but hopes to arrive before cardiac event.      PAST MEDICAL & SURGICAL HISTORY:  CAD (coronary artery disease)        Review of Systems:  Cunable to obtian given large ICH, no mental status and likely hernaition     Medications:  piperacillin/tazobactam IVPB. 3.375 Gram(s) IV Intermittent once  piperacillin/tazobactam IVPB.. 3.375 Gram(s) IV Intermittent every 8 hours        propofol Infusion 3 MICROgram(s)/kG/Min IV Continuous <Continuous>              sodium chloride 0.9%. 1000 milliLiter(s) IV Continuous <Continuous>      chlorhexidine 4% Liquid 1 Application(s) Topical <User Schedule>        Mode: AC/ CMV (Assist Control/ Continuous Mandatory Ventilation)  RR (machine): 20  TV (machine): 400  FiO2: 40  PEEP: 5  PS: 5  ITime: 1  MAP: 9  PC: 12  PIP: 17      ICU Vital Signs Last 24 Hrs  T(C): 36.4 (2022 14:16), Max: 36.4 (2022 14:16)  T(F): 97.6 (2022 14:16), Max: 97.6 (2022 14:16)  HR: 71 (2022 18:55) (56 - 114)  BP: 123/72 (2022 18:00) (58/41 - 211/81)  BP(mean): 84 (2022 18:00) (52 - 94)  RR: 20 (2022 18:00) (20 - 23)  SpO2: 99% (2022 18:55) (90% - 99%)      ABG - ( 2022 14:24 )  pH, Arterial: 7.42  pH, Blood: x     /  pCO2: 38    /  pO2: 424   / HCO3: 25    / Base Excess: 0.1   /  SaO2: 99.5                I&O's Detail        LABS:                        12.0   21.27 )-----------( 277      ( 2022 18:25 )             36.8         146<H>  |  111<H>  |  11  ----------------------------<  144<H>  3.6   |  26  |  0.72    Ca    8.4      2022 18:25    TPro  6.8  /  Alb  3.2<L>  /  TBili  0.7  /  DBili  x   /  AST  55<H>  /  ALT  33  /  AlkPhos  182<H>            CAPILLARY BLOOD GLUCOSE      POCT Blood Glucose.: 164 mg/dL (2022 14:24)    PT/INR - ( 2022 18:25 )   PT: 16.1 sec;   INR: 1.35 ratio         PTT - ( 2022 18:25 )  PTT:38.0 sec  Urinalysis Basic - ( 2022 14:35 )    Color: Yellow / Appearance: Slightly Turbid / S.010 / pH: x  Gluc: x / Ketone: Small  / Bili: Negative / Urobili: Negative   Blood: x / Protein: Negative / Nitrite: Positive   Leuk Esterase: Negative / RBC: 0-4 /HPF / WBC 0-2 /HPF   Sq Epi: x / Non Sq Epi: Neg.-Few / Bacteria: Many /HPF      CULTURES:      Physical Examination:    General: No cough/gag, pupils non-reactive to light, no corneal    HEENT: Pupils equal, reactive to light.  Symmetric.    PULM: Clear to auscultation bilaterally, no significant sputum production    CVS: Regular rate and rhythm, no murmurs, rubs, or gallops    ABD: Soft, nondistended, nontender, normoactive bowel sounds, no masses    EXT: No edema, nontender    SKIN: Warm and well perfused, no rashes noted.    RADIOLOGY:     < from: CT Head No Cont (22 @ 15:36) >  IMPRESSION:    HEAD CT: Larger parenchymal hemorrhage likely centered in the right basal   ganglia with extensive intraventricular extension, right greater than   left. Midline shift from right to left 1.6 cm. Also small amounts of   bilateral SAH.    Discussed with ALEX Fermin are in the ED at 3:48 PM.    --- End of Report ---      < end of copied text >      CRITICAL CARE TIME SPENT:  35 minutes of critical care time spent providing medical care for patient's acute illness/conditions that impairs at least one vital organ system and/or poses a high risk of imminent or life threatening deterioration in the patient's condition. It includes time spent evaluating and treating the patient's acute illness as well as time spent reviewing labs, radiology, discussing goals of care with patient and/or patient's family, and discussing the case with a multidisciplinary team, including the eICU, in an effort to prevent further life threatening deterioration or end organ damage. This time is independent of any procedures performed.

## 2022-01-14 NOTE — ED ADULT NURSE NOTE - OBJECTIVE STATEMENT
Pt BIBEMS found unresponsive last known well last night 12:30am, As per ems pt had vomitus in the airway during intubation as well as dried blood around her mouth. Pt intubated in field by EMS with ET tube size 7.0. upon arrival to ED pt remains unresponsive, IV placed, labs drawn, pt swabbed for covid and hall cath placed, propofol drip initiated. MD at bedside. Pt placed on vent with settings : RR20, , FiO2: 40% and PEEP: 5. pending brain CT and Chest Xray. WIll continue to closely monitor.

## 2022-01-14 NOTE — ED ADULT NURSE NOTE - CHIEF COMPLAINT QUOTE
BIB EMS for unresponsiveness. Pt LKW 1230am last night. Agonal breathing, intubated in the field ETT 7.0.  on arrival.  ISAR positive

## 2022-01-14 NOTE — H&P ADULT - ASSESSMENT
82 y/o F with PMH of CAD with two stents ( 2 year ago) was BIB EMS after  found unresponsive at home by her special needs daughter. Pt admitted due to:    1) Large parenchymal hemorrhage with extensive intraventricular extension  2)Acute hypoxemic respiratory failure 2/2  intracranial hemorrhage  2) Shock  3) UTI   4) Lactic acidosis   5) Coagulopathy with supraterapeuthic INR    6) Troponemia   7) Leukocytosis  8) COVID 19 PNA     Plan:   - Continue on Mechanical Ventilation   - Continue sedation with propofol   - Start pressure support with phenylephrine at 13 ml/hr and titrates as needed to maintain a MAP > 65   - Son reports thinks her mother is a blood thinner but unknown which one. In the setting of extensive intracranial bleeding and supraterapeuthic INR we will give a dose of Kcentra.    - Trend troponins   - Start Zosyn IV 3.375 every 8 hrs   - f/u BCx and UCx   - Start Dexamethasone   - ID consult   - Consider to repeat CT head at AM   - Neurochecks every 1 hr   - Maintain NPO for now   - Pulmonary toilet   - GI ppx: Potronix 40 mg IV   - DVT ppx: SCDs     Advance directives   - We spoke with Son Diallo Ferrell and discussed currently clinical status and poor prognosis. Pt want patient to remained code for now. He has plan to drive from [place] tomorrow  and then will take a final decision after he see her.       Dispo: continue ICU management     Case discussed with Dr Berger        80 y/o F with PMH of CAD with two stents ( 2 year ago) was BIB EMS after  found unresponsive at home by her special needs daughter. Pt admitted due to:    1) Large parenchymal hemorrhage with extensive intraventricular extension  2)Acute hypoxemic respiratory failure 2/2  intracranial hemorrhage  2) Shock  3) UTI   4) Lactic acidosis   5) Coagulopathy with supraterapeuthic INR    6) Troponemia   7) Leukocytosis  8) COVID 19 PNA     Plan:   - Continue on Mechanical Ventilation   - Continue sedation with propofol   - Start pressure support with phenylephrine at 13 ml/hr and titrates as needed to maintain a MAP > 65   - Son reports thinks her mother is a blood thinner but unknown which one. In the setting of extensive intracranial bleeding and supraterapeuthic INR we will give a dose of Kcentra.    - Trend troponins   - Start Zosyn IV 3.375 every 8 hrs   - f/u BCx and UCx   - Start Dexamethasone   - ID consult   - Consider to repeat CT head at AM   - Neurochecks every 1 hr   - Maintain NPO for now   - Pulmonary toilet   - GI ppx: Potronix 40 mg IV   - DVT ppx: SCDs     Advance directives   - We spoke with Son Diallo Ferrell and discussed currently clinical status and poor prognosis. Son wants patient to remained code for now. He has plan to drive from [place] tomorrow  and then will make a final decision after he see her.       Dispo: continue ICU management     Case discussed with Dr Berger        82 y/o F with PMH of CAD with two stents ( 2 year ago) was BIB EMS after  found unresponsive at home by her special needs daughter. Pt admitted due to:    1) Large parenchymal hemorrhage with extensive intraventricular extension - Terminal Event  2) Acute hypoxemic respiratory failure 2/2  intracranial hemorrhage  2) Shock due to above  3) UTI (not main cause of shock)  4) Lactic acidosis (due to hypoxia and respiratory failure)  5) Coagulopathy with supraterapeuthic INR    6) Elevated trop   7) Leukocytosis  8) COVID 19 Infection     Plan:   - PMH is limited and unknown at this time  - Continue on Mechanical Ventilation   - Continue sedation with propofol   - Start pressure support with phenylephrine at 13 ml/hr and titrates as needed to maintain a MAP > 65   - Son reports thinks her mother is a blood thinner but unknown which one. In the setting of extensive intracranial bleeding and supraterapeuthic INR we will give a dose of Kcentra.    - Trend troponins   - Start Zosyn IV 3.375 every 8 hrs empiric coverage for UTI/pna   - f/u BCx and UCx   - Consider to repeat CT head at AM   - Neurochecks every 1 hr   - Maintain NPO for now   - Pulmonary toilet   - GI ppx: Potronix 40 mg IV   - DVT ppx: SCDs     Advance directives   - We spoke with Son Diallo Ferrell and discussed currently clinical status and poor prognosis. Son wants patient to remained code for now. He has plan to drive from [place] tomorrow  and then will make a final decision after he see her.       Dispo: continue ICU management     Case discussed with Dr Berger        82 y/o F with PMH of CAD with two stents ( 2 year ago) was BIB EMS after  found unresponsive at home by her special needs daughter. Pt admitted due to:    1) Large parenchymal hemorrhage with extensive intraventricular extension - Terminal Event  2) Acute hypoxemic respiratory failure 2/2  intracranial hemorrhage  2) Shock due to above  3) UTI (not main cause of shock)  4) Lactic acidosis (due to hypoxia and respiratory failure)  5) Coagulopathy with supra terapeuthic INR  ? coumadin use baseline  6) Elevated trop   7) Leukocytosis  8) COVID 19 Infection     Plan:   - PMH is limited and unknown at this time  - Continue on Mechanical Ventilation   - Continue sedation with propofol   - Start pressure support with phenylephrine at 13 ml/hr and titrates as needed to maintain a MAP > 65   - Son reports thinks her mother is a blood thinner but unknown which one. In the setting of extensive intracranial bleeding and supraterapeuthic INR we will give a dose of Kcentra.    - Trend troponins   - Start Zosyn IV 3.375 every 8 hrs empiric coverage for UTI/pna   - f/u BCx and UCx   - Consider to repeat CT head at AM   - Neurochecks every 1 hr   - Maintain NPO for now   - Pulmonary toilet   - GI ppx: Potronix 40 mg IV   - DVT ppx: SCDs     Advance directives   - We spoke with Son Diallo Ferrell and discussed currently clinical status and poor prognosis. Son wants patient to remained code for now. He has plan to drive from [place] tomorrow  and then will make a final decision after he see her.       Dispo: continue ICU management     Case discussed with Dr Berger

## 2022-01-14 NOTE — ED PROVIDER NOTE - CLINICAL SUMMARY MEDICAL DECISION MAKING FREE TEXT BOX
80 y/o F with PMH of CAD with stents was BIB EMS found unresponsive at home by her special needs daughter. Per EMS pts last seen normal was 1230am last night, this morning when her daughter tried waking her up she was unable to and called EMS. pt was intubated by EMS in the field, she did not lose her pulse, no meds given. PE as noted above. labs reviewed. Head CT results reviewed with neurosurgery 82 y/o F with PMH of CAD with stents was BIB EMS found unresponsive at home by her special needs daughter. Per EMS pts last seen normal was 1230am last night, this morning when her daughter tried waking her up she was unable to and called EMS. pt was intubated by EMS in the field, she did not lose her pulse, no meds given. pt was initially registered as Critical[facility], her   Matthew Dawkins came to the ER and verified her name and identity. PE as noted above. labs reviewed. Head CT results reviewed with neurosurgery Dr. Espinoza-- see above reccds. Spoke with Dr. Berger. will admit to ICU 82 y/o F with PMH of CAD with stents was BIB EMS found unresponsive at home by her special needs daughter. Per EMS pts last seen normal was 1230am last night, this morning when her daughter tried waking her up she was unable to and called EMS. pt was intubated by EMS in the field, she did not lose her pulse, no meds given. pt was initially registered as Critical[facility], her   Matthew Dawkins came to the ER and verified her name and identity. PE as noted above. labs reviewed. Head CT results reviewed with neurosurgery Dr. Espinoza-- see above reccds, she also did not reccd reversal of the INR given extent of ICH, she does not believe it would be beneficial. Spoke with Dr. Berger. will admit to ICU 82 y/o F with PMH of CAD with stents was BIB EMS found unresponsive at home by her special needs daughter. Per EMS pts last seen normal was 1230am last night, this morning when her daughter tried waking her up she was unable to and called EMS. pt was intubated by EMS in the field, she did not lose her pulse, no meds given. pt was initially registered as Critical[facility], her  Rev Matthew Dawkins came to the ER and verified her name and identity. PE as noted above. labs reviewed. Head CT results reviewed with neurosurgery Dr. Espinoza-- see above reccds, she also did not reccd reversal of the INR given extent of ICH, she does not believe it would be beneficial. Spoke with Dr. Berger. will admit to ICU.  A stroke assessment was not possible due to patients critical condition requiring intubation

## 2022-01-14 NOTE — H&P ADULT - NSHPSOCIALHISTORY_GEN_ALL_CORE
Due to current medical status patient unable to provide medical, social, family history.  History obtained from available medical record.

## 2022-01-14 NOTE — ED PROVIDER NOTE - CARE PLAN
Principal Discharge DX:	ICH (intracerebral hemorrhage)  Secondary Diagnosis:	Elevated INR  Secondary Diagnosis:	2019 novel coronavirus disease (COVID-19)   1

## 2022-01-14 NOTE — H&P ADULT - NSHPPHYSICALEXAM_GEN_ALL_CORE
Vitals  T(F): 97.6 (01-14-22 @ 14:16), Max: 97.6 (01-14-22 @ 14:16)  HR: 73 (01-14-22 @ 17:51) (56 - 114)  BP: 142/77 (01-14-22 @ 17:47) (58/41 - 211/81)  RR: 20 (01-14-22 @ 17:47) (20 - 23)  SpO2: 95% (01-14-22 @ 17:51) (90% - 99%)    Physical Exam   Gen: Unresponsive, on mechanical ventilation   HENT: atraumatic head and ears, no gross abnormalities of ears, mucous membranes moist, no oral lesions, neck supple without masses/goiter/lymphadenopathy  CV: RRR, nl s1/s2, no M/R/G  Pulm: nl respiratory effort, CTAB, no wheezes/crackles/rhonchi. On mechanical ventilation   Abd: normoactive bowel sounds in all 4 quadrants, soft, nontender, nondistended, no rebound, no guarding, no masses  Extremities: no pedal edema, pedal pulses palpable   Skin: nl warm and dry, no wounds   Neuro: A&Ox0, sedated and intubated Vitals  T(F): 97.6 (01-14-22 @ 14:16), Max: 97.6 (01-14-22 @ 14:16)  HR: 73 (01-14-22 @ 17:51) (56 - 114)  BP: 142/77 (01-14-22 @ 17:47) (58/41 - 211/81)  RR: 20 (01-14-22 @ 17:47) (20 - 23)  SpO2: 95% (01-14-22 @ 17:51) (90% - 99%)    Physical Exam   Gen: Unresponsive, on mechanical ventilation   HENT: atraumatic head and ears, no gross abnormalities of ears, mucous membranes moist, no oral lesions, neck supple without masses/goiter/lymphadenopathy  CV: RRR, nl s1/s2, no M/R/G  Pulm: nl respiratory effort, CTAB, no wheezes/crackles/rhonchi. On mechanical ventilation   Abd: normoactive bowel sounds in all 4 quadrants, soft, nontender, nondistended, no rebound, no guarding, no masses  Extremities: no pedal edema, pedal pulses palpable   Skin: nl warm and dry, no wounds   Neuro: unresponsive  and intubated GCS 3, eyes not reactive dilated

## 2022-01-14 NOTE — ED PROVIDER NOTE - OBJECTIVE STATEMENT
80 y/o F with PMH of CAD with stents was BIB EMS found unresponsive at home by her special needs daughter. Per EMS pts last seen normal was 1230am last night, this morning when her daughter tried waking her up she was unable to and called EMS. 82 y/o F with PMH of CAD with stents was BIB EMS found unresponsive at home by her special needs daughter. Per EMS pts last seen normal was 1230am last night, this morning when her daughter tried waking her up she was unable to and called EMS. pt was intubated by EMS in the field, she did not lose her pulse, no meds given

## 2022-01-14 NOTE — ED ADULT TRIAGE NOTE - CHIEF COMPLAINT QUOTE
BIB EMS for unresponsiveness. Pt LKW 1230pm last night. Agonal breathing, intubated in the field 7.0. BIB EMS for unresponsiveness. Pt LKW 1230pm last night. Agonal breathing, intubated in the field 7.0.  on arrival. BIB EMS for unresponsiveness. Pt LKW 1230am last night. Agonal breathing, intubated in the field ETT 7.0.  on arrival.  ISAR positive

## 2022-01-14 NOTE — ED PROVIDER NOTE - OTHER FREE TEXT FOR MDM DISCUSSED CASE WITH QUESTION
CTC called CTC called, spoke with neurosurgeon at [facility], Dr. Esipnoza-- no neurosurgerical intervention reccd given the extent of the ICH

## 2022-01-14 NOTE — H&P ADULT - ATTENDING COMMENTS
Patient seen and examined  patient with ternminal bleed and will not survive   family wants to come bedside to give advance directives  I called son left voice mail, he called back and d/w ALEX Ackerman

## 2022-01-14 NOTE — H&P ADULT - HISTORY OF PRESENT ILLNESS
82 y/o F with PMH of CAD with two stents ( 2 year aago) was BIB EMS found unresponsive at home by her special needs daughter. Son contacted and reported that her sister found patient unresponsive  and they called the EMS. Pt last time seen normal was 12:30 am last night. Pt was intubated by EMS in the field but did not lose her pulse. Son  80 y/o F with PMH of CAD with two stents ( 2 year aago) was BIB EMS after  found unresponsive at home by her special needs daughter. Son contacted and reported that her sister found patient unresponsive  and they called the EMS. Pt last time seen normal was 12:30 am last night. Pt was intubated by EMS in the field but did not lose her pulse. Son contacted and stated think her mother is on blood thinner but is not completely sure. No other symptoms previous to the event reported.     At arrival to ED patient found with /61, HR 87,  temp 97.6, pO2 90% BVM. CT head performed and showed larger parenchymal hemorrhage likely entered in the right basal ganglia with extensive intraventricular extension, right greater than left and midline shift from right to left 1.6 cm and bilateral amount of SAH. Labs remarkable for WBC 25.06,INR 5.24, lactate 2.5, trop 198.8, COVID 19 positive.UA remarkable for an UTI.  At the ED patient received 3.3 L NS IV and one dose of Ceftriaxone. ICU consulted for further evaluation. At arrival patient found in mechanical ventilation and hypotension. Pt started in phenylephrine , Kcentra x once and transferred to ICU.          82 y/o F with PMH of CAD with two stents ( 2 year aago) was BIB EMS after  found unresponsive at home by her special needs daughter. Son contacted and reported that her sister found patient unresponsive  and they called the EMS. Pt last time seen normal was 12:30 am last night. Pt was intubated by EMS in the field but did not lose her pulse. Son contacted and stated think her mother is on blood thinner but is not completely sure. No other symptoms previous to the event reported.     At arrival to ED patient found with /61, HR 87,  temp 97.6, pO2 90% BVM. CT head performed and showed larger parenchymal hemorrhage likely entered in the right basal ganglia with extensive intraventricular extension, right greater than left and midline shift from right to left 1.6 cm and bilateral amount of SAH. Labs remarkable for WBC 25.06,INR 5.24, lactate 2.5, trop 198.8, COVID 19 positive.UA remarkable for an UTI.  At the ED patient received 3.3 L NS IV and one dose of Ceftriaxone. ICU consulted for further evaluation. At arrival patient found in mechanical ventilation and hypotension. Pt started in phenylephrine , Kcentra x once and transferred to ICU.     ED has contacted neurosurgery at [facility] where found patient bleed is not reversible and is terminal  in ED patient intubated with out sedation and is hypotensive   started on pressors

## 2022-01-14 NOTE — ED PROVIDER NOTE - CONVERSATION DETAILS
conversation was witnessed by ALANA Baez. He wants his mother to be a full code until he is able to get here from [place], conversation was witnessed by RN Mara Baez. Head CT results and my d/w neurosurgery was conveyed to him, He wants his mother to be a full code until he is able to get here from [place],

## 2022-01-14 NOTE — PROGRESS NOTE ADULT - ASSESSMENT
PLAN:  at this time very poor neuro ecam, indicating likely herniation and brain stem involvement  -poor prognosis  -son aware per day team,  -will STOp pressors now, as she is becoming increadingly more hypertensive. WIll treat with PRN IVP meds for SBP >180  -follow neuro exams  -discussed with ICU attending, will have goal of sustaining patient until son arriavesw at which time goals of care will need to occur  -for now patient is full code, however BLS/ACLS in [facility] etting of cardiac arrest is unlikely to change prognosis given size and extent of ICH  -will maintian vent, no sedation needed at this time, howeve ris on propofol for 1) BP control and 2) seizure prevention  -neuro surg was consulted by ED< no surgical intervention

## 2022-01-14 NOTE — ED PROVIDER NOTE - CRITICAL CARE ATTENDING CONTRIBUTION TO CARE
Dr. Estrada: I performed a face to face bedside interview with patient regarding history of present illness, review of symptoms and past medical history. I completed an independent physical exam.  I have discussed patient's plan of care with PA.   I agree with note as stated above, having amended the EMR as needed to reflect my findings.   This includes HISTORY OF PRESENT ILLNESS, HIV, PAST MEDICAL/SURGICAL/FAMILY/SOCIAL HISTORY, ALLERGIES AND HOME MEDICATIONS, REVIEW OF SYSTEMS, PHYSICAL EXAM, and any PROGRESS NOTES during the time I functioned as the attending physician for this patient.  direct patient care (not related to procedure), additional history taking, interpretation of diagnostic studies, documentation, consultation with other physicians, consult w/ pt's family directly relating to pts condition

## 2022-01-14 NOTE — ED PROVIDER NOTE - MDM ORDERS SUBMITTED SELECTION
Unable to fill per protocol as MD authorization required.    Last office visit - 2-25-16  Last refill - 2-25-16    Please advise.  Script set to send with approval.     Labs/EKG/Imaging Studies/Medications

## 2022-01-15 NOTE — CONSULT NOTE ADULT - SUBJECTIVE AND OBJECTIVE BOX
NEPHROLOGY CONSULTATION    CHIEF COMPLAINT:      HPI:  Came in yesterday responsive due to a large intraparenchymal CNS bleed.  Developed severe polyuria this AM up to 700 mL/hr urine output and serum Na corrie quickly from 139 to 162 meq/L.       ROS:  unable, vented      PAST MEDICAL & SURGICAL HISTORY:  CAD (coronary artery disease)        SOCIAL HISTORY:  NA    FAMILY HISTORY:  NA      MEDICATIONS  (STANDING):  chlorhexidine 4% Liquid 1 Application(s) Topical <User Schedule>  dextrose 5%. 1000 milliLiter(s) (200 mL/Hr) IV Continuous <Continuous>  dextrose 5%. 1000 milliLiter(s) (1000 mL/Hr) IV Continuous <Continuous>  influenza  Vaccine (HIGH DOSE) 0.7 milliLiter(s) IntraMuscular once  levETIRAcetam  IVPB 500 milliGRAM(s) IV Intermittent every 12 hours  phenylephrine    Infusion 0.5 MICROgram(s)/kG/Min (13.6 mL/Hr) IV Continuous <Continuous>  piperacillin/tazobactam IVPB.. 3.375 Gram(s) IV Intermittent every 8 hours  propofol Infusion 3 MICROgram(s)/kG/Min (1.31 mL/Hr) IV Continuous <Continuous>      PHYSICAL EXAMINATION:  T(F): 96.3 (01-15-22 @ 14:00)  HR: 59 (01-15-22 @ 14:00)  BP: 164/91 (01-15-22 @ 14:00)  RR: 20 (01-15-22 @ 14:00)  SpO2: 99% (01-15-22 @ 14:00)  Unresponsive on vent  PERRLA, pink conjunctivae, no ptosis  Good dentition, no pharyngeal erythema  Neck non tender, no mass, no thyromegaly or nodules  Normal respiratory effort, lungs clear to auscultation  Heart with RRR, no murmurs or rubs, no peripheral edema  Abdomen soft, no masses, no organomegaly  Skin no rashes, ulcers or lesions, normal turgor and temperature  Small amount of dilute urine in hall bag    LABS:                        15.1   17. )-----------( 330      ( 15 Tyrell 2022 05:00 )             46.7     01-15    151<H>  |  117<H>  |  12  ----------------------------<  560<HH>  2.8<LL>   |  24  |  0.86    Ca    8.9      15 Tyrell 2022 14:25  Phos  1.4     01-15  Mg     2.1     -15    TPro  6.8  /  Alb  3.2<L>  /  TBili  0.7  /  DBili  x   /  AST  55<H>  /  ALT  33  /  AlkPhos  182<H>      Urinalysis Basic - ( 2022 14:35 )  Color: Yellow / Appearance: Slightly Turbid / S.010 / pH: x  Gluc: x / Ketone: Small  / Bili: Negative / Urobili: Negative   Blood: x / Protein: Negative / Nitrite: Positive   Leuk Esterase: Negative / RBC: 0-4 /HPF / WBC 0-2 /HPF   Sq Epi: x / Non Sq Epi: Neg.-Few / Bacteria: Many /HPF        RADIOLOGY:  CT brain shows large intraparenchymal hemorrhage emanating from right basal ganglia with intraventricular extension    CXR shows NAPD        ASSESSMENT:  1.  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

## 2022-01-15 NOTE — DISCHARGE NOTE FOR THE EXPIRED PATIENT - NAME OF PERSON WHO MADE CONTACTED FAMILY
Kal Crouch, I spoke with patient's son Rodrigo, who was present in patient's room at [facility]

## 2022-01-15 NOTE — PATIENT PROFILE ADULT - FALL HARM RISK - RISK INTERVENTIONS

## 2022-01-15 NOTE — DISCHARGE NOTE FOR THE EXPIRED PATIENT - AUTOPSY COMMENTS
Addended by: Shady Navarro on: 10/3/2018 05:27 PM 
 
  Modules accepted: Orders Son Rodrigo stated he did NOT want autopsy Son Diallo stated he did NOT want autopsy

## 2022-01-15 NOTE — INITIAL ORGAN DONATION REFERRAL - NSORGANDONATIONCLINICALTRIGGER_GEN_ALL_CORE
[place] Coma Scale is less than or equal to 5/Family discussion withdrawal of life-sustaining therapies is anticipated

## 2022-01-15 NOTE — PROGRESS NOTE ADULT - ASSESSMENT
80 y/o F with PMH of CAD with two stents ( 2 year ago) was BIB EMS after being found unresponsive at home by her special needs daughter. Pt noted to have large ICH in the ED.    1) Large parenchymal hemorrhage with extensive intraventricular extension - Terminal Event  2) Acute hypoxemic respiratory failure 2/2  intracranial hemorrhage  2) Hemorrhagic Shock   3) UTI (not main cause of shock)  4) Leukocytosis  5) COVID 19 Infection   6) Lactic acidosis (due to hypoxia and respiratory failure)  7) Coagulopathy with supra therapeutic INR  ? coumadin use baseline  8) Elevated trop   9) Hypernatremia and polyuria, Likely diabetes insipidus, due to brain injury    Plan:   - Continue on Mechanical Ventilation  - Pulmonary toilet   - Continue sedation with decrease seizure potential  - Vasopressors turned off and maintain SBP <140 to prevent re-bleeding   - Reported use of unknown anticoagulant, s/p Kcentra in the ED for hypercoagulable state.    - Follow up coags and trend troponins   - D/C Zosyn if negative procalcitronin   - f/u BCx and UCx   - Repeat CT head as per neuro  - Neuro checks every 1 hr   - Maintain NPO for now  - Monitor urine output, and start on D5W  - Will order desmopressin if needed  - Repeat chemistry Q 4h   - GI ppx: Potronix 40 mg IV   - DVT ppx: SCDs   - Very poor prognosis 80 y/o F with PMH of CAD with two stents ( 2 year ago) was BIB EMS after being found unresponsive at home by her special needs daughter. Pt noted to have large ICH in the ED.  She is now becoming hypertensive with polyuria. Now with possible brain stem herniation    1) Large parenchymal hemorrhage with extensive intraventricular extension - Terminal Event  2) Acute hypoxemic respiratory failure 2/2  intracranial hemorrhage  2) Hemorrhagic Shock   3) UTI (not main cause of shock)  4) Leukocytosis  5) COVID 19 Infection   6) Lactic acidosis (due to hypoxia and respiratory failure)  7) Coagulopathy with supra therapeutic INR  ? 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    - Follow up coags and trend troponins   - D/C Zosyn if negative procalcitronin   - f/u BCx and UCx   - Repeat CT head as per neuro  - Neuro checks every 1 hr   - Maintain NPO for now  - Monitor urine output, and start on D5W  - Will order desmopressin if needed  - Repeat chemistry Q 4h   - GI ppx: Potronix 40 mg IV   - DVT ppx: SCDs   - Very poor prognosis 80 y/o F with PMH of CAD with two stents ( 2 year ago) was BIB EMS after being found unresponsive at home by her special needs daughter. Pt noted to have large ICH in the ED.  She is now becoming hypertensive with polyuria. Now with possible brain stem herniation    1) Large parenchymal hemorrhage with extensive intraventricular extension - Terminal Event  2) Acute hypoxemic respiratory failure 2/2  intracranial hemorrhage  2) Neurogenic Shock   3) UTI (not main cause of shock)  4) Leukocytosis  5) COVID 19 Infection   6) Lactic acidosis (due to hypoxia and respiratory failure)  7) Coagulopathy with supra therapeutic INR  ? coumadin use baseline  8) Elevated trop   9) Hypernatremia and polyuria, diabetes insipidus, due to brain injury (?brain stem herniation?)    Plan:   - Continue on Mechanical Ventilation  - Pulmonary toilet   - Will give keppra for seizure PPX,   - hold keppra for neuro monitoring.   - Vasopressors turned off and maintain SBP <140 to prevent re-bleeding   - Reported use of unknown anticoagulant, s/p Kcentra in the ED for hypercoagulable state.    - Follow up coags and trend troponins   - monitor off antibiotics  f/u BCx and UCx   - Repeat CT head as per neuro  - Neuro checks every 1 hr   - Maintain NPO for now  - Monitor urine output, and start on D5W  - Will order desmopressin if needed  - Repeat chemistry Q 4h   - GI ppx: Potronix 40 mg IV   - DVT ppx: SCDs   - Very poor prognosis

## 2022-01-15 NOTE — PROGRESS NOTE ADULT - SUBJECTIVE AND OBJECTIVE BOX
Patient's family arrived at bedside and reviewed patient' clinical status. Family has made it clear that they would like comfort care measures only. They requested patient be removed from mechanical ventilation. Patient's family is well aware of the risks and consequences of removing patient form the ventilator, which may result in possible death. A MOLST was completed and signed by the son (Diallo Ferrell - [facility]). Patient's son has been very sure of his decision and would like to discontinue mechanical ventilation immediately. Case d/w Dr. Berger who is aware of family's decision.

## 2022-01-15 NOTE — PROGRESS NOTE ADULT - SUBJECTIVE AND OBJECTIVE BOX
Patient is a 81y old  Female who presents with a chief complaint of Unresponsiveness, Intracranial hemorrhage (2022 19:35)      BRIEF HOSPITAL COURSE: ***    Events last 24 hours: ***    PAST MEDICAL & SURGICAL HISTORY:  CAD (coronary artery disease)        Review of Systems:  CONSTITUTIONAL: No fever, chills, or fatigue  EYES: No eye pain, visual disturbances, or discharge  ENMT:  No difficulty hearing, tinnitus, vertigo; No sinus or throat pain  NECK: No pain or stiffness  RESPIRATORY: No cough, wheezing, chills or hemoptysis; No shortness of breath  CARDIOVASCULAR: No chest pain, palpitations, dizziness, or leg swelling  GASTROINTESTINAL: No abdominal or epigastric pain. No nausea, vomiting, or hematemesis; No diarrhea or constipation. No melena or hematochezia.  GENITOURINARY: No dysuria, frequency, hematuria, or incontinence  NEUROLOGICAL: No headaches, memory loss, loss of strength, numbness, or tremors  SKIN: No itching, burning, rashes, or lesions   MUSCULOSKELETAL: No joint pain or swelling; No muscle, back, or extremity pain  PSYCHIATRIC: No depression, anxiety, mood swings, or difficulty sleeping      Medications:  piperacillin/tazobactam IVPB.. 3.375 Gram(s) IV Intermittent every 8 hours        propofol Infusion 3 MICROgram(s)/kG/Min IV Continuous <Continuous>              potassium chloride  10 mEq/100 mL IVPB 10 milliEquivalent(s) IV Intermittent every 1 hour  sodium chloride 0.9%. 1000 milliLiter(s) IV Continuous <Continuous>    influenza  Vaccine (HIGH DOSE) 0.7 milliLiter(s) IntraMuscular once    chlorhexidine 4% Liquid 1 Application(s) Topical <User Schedule>        Mode: AC/ CMV (Assist Control/ Continuous Mandatory Ventilation)  RR (machine): 20  TV (machine): 400  FiO2: 35  PEEP: 5  PS: 5  ITime: 1  MAP: 9  PC: 12  PIP: 18      ICU Vital Signs Last 24 Hrs  T(C): 36.8 (15 Tyrell 2022 06:00), Max: 37.1 (2022 22:00)  T(F): 98.2 (15 Tyrell 2022 06:00), Max: 98.7 (2022 22:00)  HR: 52 (15 Tyrell 2022 06:00) (50 - 114)  BP: 103/71 (15 Tyrell 2022 06:00) (58/41 - 211/81)  BP(mean): 82 (15 Tyrell 2022 06:00) (52 - 118)  ABP: --  ABP(mean): --  RR: 20 (15 Tyrell 2022 06:00) (20 - 23)  SpO2: 97% (15 Tyrell 2022 06:00) (90% - 99%)      ABG - ( 15 Tyrell 2022 04:35 )  pH, Arterial: 7.40  pH, Blood: x     /  pCO2: 44    /  pO2: 158   / HCO3: 27    / Base Excess: 2.5   /  SaO2: 99.9                I&O's Detail    2022 07:01  -  15 Tyrell 2022 07:00  --------------------------------------------------------  IN:    IV PiggyBack: 200 mL    sodium chloride 0.9%: 1200 mL  Total IN: 1400 mL    OUT:    Indwelling Catheter - Urethral (mL): 1645 mL  Total OUT: 1645 mL    Total NET: -245 mL            LABS:                        15.1   17.22 )-----------( 330      ( 15 Tyrell 2022 05:00 )             46.7     01-15    162<HH>  |  126<H>  |  11  ----------------------------<  188<H>  3.1<L>   |  24  |  0.70    Ca    9.8      15 Tyrell 2022 05:00    TPro  6.8  /  Alb  3.2<L>  /  TBili  0.7  /  DBili  x   /  AST  55<H>  /  ALT  33  /  AlkPhos  182<H>  -14          CAPILLARY BLOOD GLUCOSE      POCT Blood Glucose.: 156 mg/dL (2022 21:33)    PT/INR - ( 2022 18:25 )   PT: 16.1 sec;   INR: 1.35 ratio         PTT - ( 2022 18:25 )  PTT:38.0 sec  Urinalysis Basic - ( 2022 14:35 )    Color: Yellow / Appearance: Slightly Turbid / S.010 / pH: x  Gluc: x / Ketone: Small  / Bili: Negative / Urobili: Negative   Blood: x / Protein: Negative / Nitrite: Positive   Leuk Esterase: Negative / RBC: 0-4 /HPF / WBC 0-2 /HPF   Sq Epi: x / Non Sq Epi: Neg.-Few / Bacteria: Many /HPF      CULTURES:      Physical Examination:    General: No acute distress.  Alert, oriented, interactive, nonfocal    HEENT: Pupils equal, reactive to light.  Symmetric.    PULM: Clear to auscultation bilaterally, no significant sputum production    CVS: Regular rate and rhythm, no murmurs, rubs, or gallops    ABD: Soft, nondistended, nontender, normoactive bowel sounds, no masses    EXT: No edema, nontender    SKIN: Warm and well perfused, no rashes noted.    RADIOLOGY: ***    CRITICAL CARE TIME SPENT:  minutes of critical care time spent providing medical care for patient's acute illness/conditions that impairs at least one vital organ system and/or poses a high risk of imminent or life threatening deterioration in the patient's condition. It includes time spent evaluating and treating the patient's acute illness as well as time spent reviewing labs, radiology, discussing goals of care with patient and/or patient's family, and discussing the case with a multidisciplinary team, including the eICU, in an effort to prevent further life threatening deterioration or end organ damage. This time is independent of any procedures performed.       Patient is a 81y old  Female who presents with a chief complaint of Unresponsiveness, Intracranial hemorrhage (2022 19:35)      Events last 24 hours:   Patient became hypertensive overnight, vasopressors turned off. She remained unresponsive overnight and afebrile on vent. She is voiding freely via hall catheter large amounts.    PAST MEDICAL & SURGICAL HISTORY:  CAD (coronary artery disease)      Medications:  piperacillin/tazobactam IVPB.. 3.375 Gram(s) IV Intermittent every 8 hours  propofol Infusion 3 MICROgram(s)/kG/Min IV Continuous <Continuous>  potassium chloride  10 mEq/100 mL IVPB 10 milliEquivalent(s) IV Intermittent every 1 hour  sodium chloride 0.9%. 1000 milliLiter(s) IV Continuous <Continuous>  influenza  Vaccine (HIGH DOSE) 0.7 milliLiter(s) IntraMuscular once  chlorhexidine 4% Liquid 1 Application(s) Topical <User Schedule>        Mode: AC/ CMV (Assist Control/ Continuous Mandatory Ventilation)  RR (machine): 20  TV (machine): 400  FiO2: 35  PEEP: 5  PS: 5  ITime: 1  MAP: 9  PC: 12  PIP: 18      ICU Vital Signs Last 24 Hrs  T(C): 36.8 (15 Tyrell 2022 06:00), Max: 37.1 (2022 22:00)  T(F): 98.2 (15 Tyrell 2022 06:00), Max: 98.7 (2022 22:00)  HR: 52 (15 Tyrell 2022 06:00) (50 - 114)  BP: 103/71 (15 Tyrell 2022 06:00) (58/41 - 211/81)  BP(mean): 82 (15 Tyrell 2022 06:00) (52 - 118)  ABP: --  ABP(mean): --  RR: 20 (15 Tyrell 2022 06:00) (20 - 23)  SpO2: 97% (15 Tyrell 2022 06:00) (90% - 99%)      ABG - ( 15 Tyrell 2022 04:35 )  pH, Arterial: 7.40  pH, Blood: x     /  pCO2: 44    /  pO2: 158   / HCO3: 27    / Base Excess: 2.5   /  SaO2: 99.9              I&O's Detail    2022 07:01  -  15 Tyrell 2022 07:00  --------------------------------------------------------  IN:    IV PiggyBack: 200 mL    sodium chloride 0.9%: 1200 mL  Total IN: 1400 mL    OUT:    Indwelling Catheter - Urethral (mL): 1645 mL  Total OUT: 1645 mL    Total NET: -245 mL            LABS:                        15.1   17.22 )-----------( 330      ( 15 Tyrell 2022 05:00 )             46.7     01-15    162<HH>  |  126<H>  |  11  ----------------------------<  188<H>  3.1<L>   |  24  |  0.70    Ca    9.8      15 Tyrell 2022 05:00    TPro  6.8  /  Alb  3.2<L>  /  TBili  0.7  /  DBili  x   /  AST  55<H>  /  ALT  33  /  AlkPhos  182<H>            CAPILLARY BLOOD GLUCOSE      POCT Blood Glucose.: 156 mg/dL (2022 21:33)    PT/INR - ( 2022 18:25 )   PT: 16.1 sec;   INR: 1.35 ratio         PTT - ( 2022 18:25 )  PTT:38.0 sec  Urinalysis Basic - ( 2022 14:35 )    Color: Yellow / Appearance: Slightly Turbid / S.010 / pH: x  Gluc: x / Ketone: Small  / Bili: Negative / Urobili: Negative   Blood: x / Protein: Negative / Nitrite: Positive   Leuk Esterase: Negative / RBC: 0-4 /HPF / WBC 0-2 /HPF   Sq Epi: x / Non Sq Epi: Neg.-Few / Bacteria: Many /HPF      CULTURES:      Physical Examination:    General: No acute distress.  Alert, oriented, interactive, nonfocal    HEENT: Pupils equal, reactive to light.  Symmetric.    PULM: Clear to auscultation bilaterally, no significant sputum production    CVS: Regular rate and rhythm, no murmurs, rubs, or gallops    ABD: Soft, nondistended, nontender, normoactive bowel sounds, no masses    EXT: No edema, nontender    SKIN: Warm and well perfused, no rashes noted.    RADIOLOGY: ***    CRITICAL CARE TIME SPENT:  minutes of critical care time spent providing medical care for patient's acute illness/conditions that impairs at least one vital organ system and/or poses a high risk of imminent or life threatening deterioration in the patient's condition. It includes time spent evaluating and treating the patient's acute illness as well as time spent reviewing labs, radiology, discussing goals of care with patient and/or patient's family, and discussing the case with a multidisciplinary team, including the eICU, in an effort to prevent further life threatening deterioration or end organ damage. This time is independent of any procedures performed.       Patient is a 81y old  Female who presents with a chief complaint of Unresponsiveness, Intracranial hemorrhage (2022 19:35)      Events last 24 hours:   Patient became hypertensive overnight, vasopressors turned off. She remained unresponsive overnight and afebrile on vent. She is voiding freely via hall catheter large amounts.    PAST MEDICAL & SURGICAL HISTORY:  CAD (coronary artery disease)      Medications:  piperacillin/tazobactam IVPB.. 3.375 Gram(s) IV Intermittent every 8 hours  propofol Infusion 3 MICROgram(s)/kG/Min IV Continuous <Continuous>  potassium chloride  10 mEq/100 mL IVPB 10 milliEquivalent(s) IV Intermittent every 1 hour  sodium chloride 0.9%. 1000 milliLiter(s) IV Continuous <Continuous>  influenza  Vaccine (HIGH DOSE) 0.7 milliLiter(s) IntraMuscular once  chlorhexidine 4% Liquid 1 Application(s) Topical <User Schedule>        Mode: AC/ CMV (Assist Control/ Continuous Mandatory Ventilation)  RR (machine): 20  TV (machine): 400  FiO2: 35  PEEP: 5  PS: 5  ITime: 1  MAP: 9  PC: 12  PIP: 18      ICU Vital Signs Last 24 Hrs  T(C): 36.8 (15 Tyrell 2022 06:00), Max: 37.1 (2022 22:00)  T(F): 98.2 (15 Tyrell 2022 06:00), Max: 98.7 (2022 22:00)  HR: 52 (15 Tyrell 2022 06:00) (50 - 114)  BP: 103/71 (15 Tyrell 2022 06:00) (58/41 - 211/81)  BP(mean): 82 (15 Tyrell 2022 06:00) (52 - 118)  ABP: --  ABP(mean): --  RR: 20 (15 Tyrell 2022 06:00) (20 - 23)  SpO2: 97% (15 Tyrell 2022 06:00) (90% - 99%)      ABG - ( 15 Tyrell 2022 04:35 )  pH, Arterial: 7.40  pH, Blood: x     /  pCO2: 44    /  pO2: 158   / HCO3: 27    / Base Excess: 2.5   /  SaO2: 99.9              I&O's Detail    2022 07:01  -  15 Tyrell 2022 07:00  --------------------------------------------------------  IN:    IV PiggyBack: 200 mL    sodium chloride 0.9%: 1200 mL  Total IN: 1400 mL    OUT:    Indwelling Catheter - Urethral (mL): 1645 mL  Total OUT: 1645 mL    Total NET: -245 mL            LABS:                        15.1   17.22 )-----------( 330      ( 15 Tyrell 2022 05:00 )             46.7     01-15    162<HH>  |  126<H>  |  11  ----------------------------<  188<H>  3.1<L>   |  24  |  0.70    Ca    9.8      15 Tyrell 2022 05:00    TPro  6.8  /  Alb  3.2<L>  /  TBili  0.7  /  DBili  x   /  AST  55<H>  /  ALT  33  /  AlkPhos  182<H>            CAPILLARY BLOOD GLUCOSE      POCT Blood Glucose.: 156 mg/dL (2022 21:33)    PT/INR - ( 2022 18:25 )   PT: 16.1 sec;   INR: 1.35 ratio         PTT - ( 2022 18:25 )  PTT:38.0 sec  Urinalysis Basic - ( 2022 14:35 )    Color: Yellow / Appearance: Slightly Turbid / S.010 / pH: x  Gluc: x / Ketone: Small  / Bili: Negative / Urobili: Negative   Blood: x / Protein: Negative / Nitrite: Positive   Leuk Esterase: Negative / RBC: 0-4 /HPF / WBC 0-2 /HPF   Sq Epi: x / Non Sq Epi: Neg.-Few / Bacteria: Many /HPF          Physical Examination:    General: Unresponsive on mechanical ventilation.     HEENT: Right blown pupil, sluggish left pupil. No corneal reflex    PULM: Clear to auscultation bilaterally, no significant sputum production    CVS: Regular rate and rhythm, no murmurs, rubs, or gallops    ABD: Soft, nondistended, nontender, normoactive bowel sounds, no masses    EXT: No edema, nontender    SKIN: Warm and well perfused, no rashes noted.         Patient is a 81y old  Female who presents with a chief complaint of Unresponsiveness, Intracranial hemorrhage (2022 19:35)      Events last 24 hours:   Patient became hypertensive overnight, vasopressors turned off. She remained unresponsive overnight and afebrile on vent. She is voiding freely via hall catheter large amounts.    PAST MEDICAL & SURGICAL HISTORY:  CAD (coronary artery disease)      Medications:  piperacillin/tazobactam IVPB.. 3.375 Gram(s) IV Intermittent every 8 hours  propofol Infusion 3 MICROgram(s)/kG/Min IV Continuous <Continuous>  potassium chloride  10 mEq/100 mL IVPB 10 milliEquivalent(s) IV Intermittent every 1 hour  sodium chloride 0.9%. 1000 milliLiter(s) IV Continuous <Continuous>  influenza  Vaccine (HIGH DOSE) 0.7 milliLiter(s) IntraMuscular once  chlorhexidine 4% Liquid 1 Application(s) Topical <User Schedule>        Mode: AC/ CMV (Assist Control/ Continuous Mandatory Ventilation)  RR (machine): 20  TV (machine): 400  FiO2: 35  PEEP: 5  PS: 5  ITime: 1  MAP: 9  PC: 12  PIP: 18      ICU Vital Signs Last 24 Hrs  T(C): 36.8 (15 Tyrell 2022 06:00), Max: 37.1 (2022 22:00)  T(F): 98.2 (15 Tyrell 2022 06:00), Max: 98.7 (2022 22:00)  HR: 52 (15 Tyrell 2022 06:00) (50 - 114)  BP: 103/71 (15 Tyrell 2022 06:00) (58/41 - 211/81)  BP(mean): 82 (15 Tyrell 2022 06:00) (52 - 118)  ABP: --  ABP(mean): --  RR: 20 (15 Tyrell 2022 06:00) (20 - 23)  SpO2: 97% (15 Tyrell 2022 06:00) (90% - 99%)      ABG - ( 15 Tyrell 2022 04:35 )  pH, Arterial: 7.40  pH, Blood: x     /  pCO2: 44    /  pO2: 158   / HCO3: 27    / Base Excess: 2.5   /  SaO2: 99.9              I&O's Detail    2022 07:01  -  15 Tyrell 2022 07:00  --------------------------------------------------------  IN:    IV PiggyBack: 200 mL    sodium chloride 0.9%: 1200 mL  Total IN: 1400 mL    OUT:    Indwelling Catheter - Urethral (mL): 1645 mL  Total OUT: 1645 mL    Total NET: -245 mL            LABS:                        15.1   17.22 )-----------( 330      ( 15 Tyrell 2022 05:00 )             46.7     0115    162<HH>  |  126<H>  |  11  ----------------------------<  188<H>  3.1<L>   |  24  |  0.70    Ca    9.8      15 Tyrell 2022 05:00    TPro  6.8  /  Alb  3.2<L>  /  TBili  0.7  /  DBili  x   /  AST  55<H>  /  ALT  33  /  AlkPhos  182<H>            CAPILLARY BLOOD GLUCOSE      POCT Blood Glucose.: 156 mg/dL (2022 21:33)    PT/INR - ( 2022 18:25 )   PT: 16.1 sec;   INR: 1.35 ratio         PTT - ( 2022 18:25 )  PTT:38.0 sec  Urinalysis Basic - ( 2022 14:35 )    Color: Yellow / Appearance: Slightly Turbid / S.010 / pH: x  Gluc: x / Ketone: Small  / Bili: Negative / Urobili: Negative   Blood: x / Protein: Negative / Nitrite: Positive   Leuk Esterase: Negative / RBC: 0-4 /HPF / WBC 0-2 /HPF   Sq Epi: x / Non Sq Epi: Neg.-Few / Bacteria: Many /HPF          Physical Examination:    General: Unresponsive on mechanical ventilation, orally intubated     HEENT: Right blown pupil, sluggish left pupil. No corneal reflex    PULM: Clear to auscultation bilaterally, no significant sputum production    CVS: Regular rate and rhythm, no murmurs, rubs, or gallops    ABD: Soft, nondistended, nontender, normoactive bowel sounds, no masses    EXT: No edema, nontender    SKIN: Warm and well perfused, no rashes noted.         Patient is a 81y old  Female who presents with a chief complaint of Unresponsiveness, Intracranial hemorrhage (2022 19:35)      Events last 24 hours:   Patient became hypertensive overnight, vasopressors turned off. She remained unresponsive overnight and afebrile on vent. She is voiding freely via hall catheter in large amounts.    PAST MEDICAL & SURGICAL HISTORY:  CAD (coronary artery disease)      Medications:  piperacillin/tazobactam IVPB.. 3.375 Gram(s) IV Intermittent every 8 hours  propofol Infusion 3 MICROgram(s)/kG/Min IV Continuous <Continuous>  potassium chloride  10 mEq/100 mL IVPB 10 milliEquivalent(s) IV Intermittent every 1 hour  sodium chloride 0.9%. 1000 milliLiter(s) IV Continuous <Continuous>  influenza  Vaccine (HIGH DOSE) 0.7 milliLiter(s) IntraMuscular once  chlorhexidine 4% Liquid 1 Application(s) Topical <User Schedule>        Mode: AC/ CMV (Assist Control/ Continuous Mandatory Ventilation)  RR (machine): 20  TV (machine): 400  FiO2: 35  PEEP: 5  PS: 5  ITime: 1  MAP: 9  PC: 12  PIP: 18      ICU Vital Signs Last 24 Hrs  T(C): 36.8 (15 Tyrell 2022 06:00), Max: 37.1 (2022 22:00)  T(F): 98.2 (15 Tyrell 2022 06:00), Max: 98.7 (2022 22:00)  HR: 52 (15 Tyrell 2022 06:00) (50 - 114)  BP: 103/71 (15 Tyrell 2022 06:00) (58/41 - 211/81)  BP(mean): 82 (15 Tyrell 2022 06:00) (52 - 118)  ABP: --  ABP(mean): --  RR: 20 (15 Tyrell 2022 06:00) (20 - 23)  SpO2: 97% (15 Tyrell 2022 06:00) (90% - 99%)      ABG - ( 15 Tyrell 2022 04:35 )  pH, Arterial: 7.40  pH, Blood: x     /  pCO2: 44    /  pO2: 158   / HCO3: 27    / Base Excess: 2.5   /  SaO2: 99.9              I&O's Detail    2022 07:01  -  15 Tyrell 2022 07:00  --------------------------------------------------------  IN:    IV PiggyBack: 200 mL    sodium chloride 0.9%: 1200 mL  Total IN: 1400 mL    OUT:    Indwelling Catheter - Urethral (mL): 1645 mL  Total OUT: 1645 mL    Total NET: -245 mL            LABS:                        15.1   17.22 )-----------( 330      ( 15 Tyrell 2022 05:00 )             46.7     01-15    162<HH>  |  126<H>  |  11  ----------------------------<  188<H>  3.1<L>   |  24  |  0.70    Ca    9.8      15 Tyrell 2022 05:00    TPro  6.8  /  Alb  3.2<L>  /  TBili  0.7  /  DBili  x   /  AST  55<H>  /  ALT  33  /  AlkPhos  182<H>            CAPILLARY BLOOD GLUCOSE      POCT Blood Glucose.: 156 mg/dL (2022 21:33)    PT/INR - ( 2022 18:25 )   PT: 16.1 sec;   INR: 1.35 ratio         PTT - ( 2022 18:25 )  PTT:38.0 sec  Urinalysis Basic - ( 2022 14:35 )    Color: Yellow / Appearance: Slightly Turbid / S.010 / pH: x  Gluc: x / Ketone: Small  / Bili: Negative / Urobili: Negative   Blood: x / Protein: Negative / Nitrite: Positive   Leuk Esterase: Negative / RBC: 0-4 /HPF / WBC 0-2 /HPF   Sq Epi: x / Non Sq Epi: Neg.-Few / Bacteria: Many /HPF          Physical Examination:    General: Unresponsive on mechanical ventilation, orally intubated     HEENT: Right blown pupil, sluggish left pupil. No corneal reflex    PULM: Clear to auscultation bilaterally, no significant sputum production    CVS: Regular rate and rhythm, no murmurs, rubs, or gallops    ABD: Soft, nondistended, nontender, normoactive bowel sounds, no masses    EXT: No edema, nontender    SKIN: Warm and well perfused, no rashes noted.         Patient is a 81y old  Female who presents with a chief complaint of Unresponsiveness, Intracranial hemorrhage (2022 19:35)      Events last 24 hours:   Patient became hypertensive overnight, vasopressors turned off. She remained unresponsive overnight and afebrile on vent. She is voiding freely via hall catheter in large amounts.  now in DI with increasing na  and increased urine output  Vasopressin and d5w given, renal called    PAST MEDICAL & SURGICAL HISTORY:  CAD (coronary artery disease)      Medications:  piperacillin/tazobactam IVPB.. 3.375 Gram(s) IV Intermittent every 8 hours  propofol Infusion 3 MICROgram(s)/kG/Min IV Continuous <Continuous>  potassium chloride  10 mEq/100 mL IVPB 10 milliEquivalent(s) IV Intermittent every 1 hour  sodium chloride 0.9%. 1000 milliLiter(s) IV Continuous <Continuous>  influenza  Vaccine (HIGH DOSE) 0.7 milliLiter(s) IntraMuscular once  chlorhexidine 4% Liquid 1 Application(s) Topical <User Schedule>        Mode: AC/ CMV (Assist Control/ Continuous Mandatory Ventilation)  RR (machine): 20  TV (machine): 400  FiO2: 35  PEEP: 5  PS: 5  ITime: 1  MAP: 9  PC: 12  PIP: 18      ICU Vital Signs Last 24 Hrs  T(C): 36.8 (15 Tyrell 2022 06:00), Max: 37.1 (2022 22:00)  T(F): 98.2 (15 Tyrell 2022 06:00), Max: 98.7 (2022 22:00)  HR: 52 (15 Tyrell 2022 06:00) (50 - 114)  BP: 103/71 (15 Tyrell 2022 06:00) (58/41 - 211/81)  BP(mean): 82 (15 Tyrell 2022 06:00) (52 - 118)  ABP: --  ABP(mean): --  RR: 20 (15 Tyrell 2022 06:00) (20 - 23)  SpO2: 97% (15 Tyrell 2022 06:00) (90% - 99%)      ABG - ( 15 Tyrell 2022 04:35 )  pH, Arterial: 7.40  pH, Blood: x     /  pCO2: 44    /  pO2: 158   / HCO3: 27    / Base Excess: 2.5   /  SaO2: 99.9              I&O's Detail    2022 07:01  -  15 Tyrell 2022 07:00  --------------------------------------------------------  IN:    IV PiggyBack: 200 mL    sodium chloride 0.9%: 1200 mL  Total IN: 1400 mL    OUT:    Indwelling Catheter - Urethral (mL): 1645 mL  Total OUT: 1645 mL    Total NET: -245 mL            LABS:                        15.1   17.22 )-----------( 330      ( 15 Tyrell 2022 05:00 )             46.7     01-15    162<HH>  |  126<H>  |  11  ----------------------------<  188<H>  3.1<L>   |  24  |  0.70    Ca    9.8      15 Tyrell 2022 05:00    TPro  6.8  /  Alb  3.2<L>  /  TBili  0.7  /  DBili  x   /  AST  55<H>  /  ALT  33  /  AlkPhos  182<H>            CAPILLARY BLOOD GLUCOSE      POCT Blood Glucose.: 156 mg/dL (2022 21:33)    PT/INR - ( 2022 18:25 )   PT: 16.1 sec;   INR: 1.35 ratio         PTT - ( 2022 18:25 )  PTT:38.0 sec  Urinalysis Basic - ( 2022 14:35 )    Color: Yellow / Appearance: Slightly Turbid / S.010 / pH: x  Gluc: x / Ketone: Small  / Bili: Negative / Urobili: Negative   Blood: x / Protein: Negative / Nitrite: Positive   Leuk Esterase: Negative / RBC: 0-4 /HPF / WBC 0-2 /HPF   Sq Epi: x / Non Sq Epi: Neg.-Few / Bacteria: Many /HPF          Physical Examination:    General: Unresponsive on mechanical ventilation, orally intubated     HEENT: Right blown pupil, sluggish left pupil. No corneal reflex    PULM: Clear to auscultation bilaterally, no significant sputum production    CVS: Regular rate and rhythm, no murmurs, rubs, or gallops    ABD: Soft, nondistended, nontender, normoactive bowel sounds, no masses    EXT: No edema, nontender    SKIN: Warm and well perfused, no rashes noted.

## 2022-01-15 NOTE — PROGRESS NOTE ADULT - ATTENDING COMMENTS
patient seen and examined  unresponsive  Renal consult called  alee rowan.   monitor off propfol  npo for now  continue hall for strict I&O  Family on way to hospital .

## 2022-01-15 NOTE — PROGRESS NOTE ADULT - REASON FOR ADMISSION
Unresponsiveness, Intracranial hemorrhage

## 2022-01-19 LAB
CULTURE RESULTS: SIGNIFICANT CHANGE UP
CULTURE RESULTS: SIGNIFICANT CHANGE UP
SPECIMEN SOURCE: SIGNIFICANT CHANGE UP
SPECIMEN SOURCE: SIGNIFICANT CHANGE UP

## 2022-01-31 ENCOUNTER — APPOINTMENT (OUTPATIENT)
Dept: CARDIOLOGY | Facility: CLINIC | Age: 82
End: 2022-01-31

## 2022-02-11 ENCOUNTER — RX RENEWAL (OUTPATIENT)
Age: 82
End: 2022-02-11

## 2022-04-04 ENCOUNTER — APPOINTMENT (OUTPATIENT)
Dept: CARDIOLOGY | Facility: CLINIC | Age: 82
End: 2022-04-04

## 2022-05-16 NOTE — DIETITIAN INITIAL EVALUATION ADULT. - NAME AND PHONE
Detail Level: Detailed
Add 65810 Cpt? (Important Note: In 2017 The Use Of 47248 Is Being Tracked By Cms To Determine Future Global Period Reimbursement For Global Periods): yes
Marcela Buchanan RD, CDN Pager: [phone]

## 2022-06-01 NOTE — ED PROVIDER NOTE - CLINICAL SUMMARY MEDICAL DECISION MAKING FREE TEXT BOX
79 y/o F with no reported PMH presents to the ED with c/o intermittent episodes of nonexertional, non radiating chest tightness x 2 days and SOB, worsened with supination, also reported rhinorrhea. Also reported she has to sleep propped up on pillows x 2 days. Denies palpitations, n/v/d, calf pain, ankle swelling, prolonged immobilization or all other complaints. PE as noted above, patient tachycardiac, hypoxic and febrile. ED sepsis w/u initiated, cardiac labs IVF given, abx started, CXR, reassess detailed exam

## 2022-08-22 NOTE — REASON FOR VISIT
Prescription filled per refill protocol.    [Symptom and Test Evaluation] : symptom and test evaluation [Structural Heart and Valve Disease] : structural heart and valve disease [Coronary Artery Disease] : coronary artery disease [FreeTextEntry1] : Patient returns for followup. Feeling well. Offers no complaints of chest discomfort shortness of breath palpitations dizziness or syncope.Repeat potassium levels both serum and plasma were 4.2. This is the first time they have been below 5 since I have been caring for the patient. She states that she was eating bananas at least one a day and a large quantity of potatoes as well as a salt substitute that contain potassium and has discontinued all of these. echocardiogram today reveals an ejection fraction approximately 42% unchanged from prior\par

## 2022-11-22 NOTE — CONSULT NOTE ADULT - REASON FOR ADMISSION
Unresponsiveness, Intracranial hemorrhage
Initiate Treatment: tretinoin 0.025 % topical cream \\nApply pea size amount to affected areas once daily at bedtime\\n\\nbenzoyl peroxide 10 % topical cleanser \\nWash face twice daily
Detail Level: Zone

## 2023-01-01 NOTE — ASU DISCHARGE PLAN (ADULT/PEDIATRIC) - ACTIVITY LEVEL
This note was copied from the mother's chart.   Stopped at room, pt sleeping No excercise/No heavy lifting/No sports/gym

## 2023-03-02 NOTE — PROGRESS NOTE ADULT - PROBLEM SELECTOR PLAN 3
"Primary Physician: Pawan Knutson MD    Chief Complaint   Patient presents with   • Heartburn     Pt c/o issues with reflux for the last couple of months-states it \"comes and goes\" and \"hits out of the blue\" -pt reports history of Fox's-last endo was 8/26/2020; Pt has been on BID Pantoprazole but insurance isn't wanting to pay for BID dosing-pt states if she doesn't take the 2nd dose she has \"terrible\" reflux overnight    • Nausea     Pt does c/o occasional nausea   • Abdominal Pain     Pt also c/o LUQ pain the last month or so-also states at times she has upper abdominal pain that \"comes and goes\"-states she has good days and bad days; Pt had endo/colon 8/26/2020       Subjective     Sue Delgado is a 50 y.o. female.    HPI   GERD  Pt with chronic acid reflux.  Her insurance stopped covering BID dosing at the first of the year and therefore has only been taking Pantoprazole once daily.  Her heartburn is bad in the day time hours.  She is currently taking Pantoprazole in the morning hours and she is now stating she has terrible night time heartburn and regurgitation. Has upper abdominal pain in the EVA region that is sometimes in her LUQ region as well.    Last endoscopy 8/26/2020: small HH, non-erosive gastritis, multiple gastric polyps, normal duodenum.    Previous Bravo Study in 2015 by Dr Kramer.    Pt is obese and it has been reviewed with her that increased weight can correlate to increased acid reflux.    She will use Ibuprofen 800mg only about once per month, no NSAIDS regularly      Pt is up to date on colonoscopy. Last done 8/26/2020 at which time she had multiple small mouthed diverticula in entire colon & non bleeding internal hemorrhoids.        Past Medical History:   Diagnosis Date   • Allergic rhinitis    • Diverticulosis    • GERD (gastroesophageal reflux disease)    • History of transfusion    • Hypertension    • Hypothyroidism        Past Surgical History:   Procedure Laterality Date   • " BRAVO PROCEDURE  08/17/2015    Dr. Sorensen-See report   • CHOLECYSTECTOMY     • COLONOSCOPY  07/10/2015    Dr. Sorensen-Diverticulosis; Small internal hemorrhoids; Repeat 3-5 years   • COLONOSCOPY N/A 8/26/2020    Diverticulosis in the entire examined colon; Non-bleeding internal hemorrhoids; No specimens collected; Repeat 10 years   • CYSTOCELE REPAIR     • ENDOSCOPY N/A 2/26/2020    LA Grade A reflux esophagitis; Non-erosive gastritis-biopsied; Normal examined duodenum-biopsied   • ENDOSCOPY  05/15/2018    Dr. Sorensen-Irregular Z-line at EG junction; Gastritis; Multiple gastric biopsies taken   • ENDOSCOPY  07/10/2015    Dr. Sorensen-Irregular Z-line at EG junction; HH; Diffuse gastritis-biopsied; Multiple small bowel biopsies taken to rule out celiac disease   • ENDOSCOPY N/A 8/26/2020    Small HH; Non-erosive gastritis; Multiple gastric polyps-biopsied; Normal examined duodenum   • ESOPHAGEAL MANOMETRY  08/18/2015    Dr. Sorensen-Pete report   • HYSTERECTOMY     • TUBAL ABDOMINAL LIGATION          Current Outpatient Medications:   •  amLODIPine (NORVASC) 5 MG tablet, Take 1 tablet by mouth Daily., Disp: , Rfl:   •  ascorbic acid (VITAMIN C) 250 MG tablet, Take 1 tablet by mouth Daily., Disp: , Rfl:   •  cetirizine (zyrTEC) 10 MG tablet, Take 1 tablet by mouth Daily., Disp: , Rfl:   •  Cholecalciferol 25 MCG (1000 UT) tablet, Take 1 tablet by mouth Daily., Disp: , Rfl:   •  ibuprofen (ADVIL,MOTRIN) 800 MG tablet, Take 1 tablet by mouth As Needed., Disp: , Rfl:   •  levothyroxine (SYNTHROID, LEVOTHROID) 75 MCG tablet, Take 1 tablet by mouth Daily., Disp: , Rfl:   •  losartan (COZAAR) 100 MG tablet, Take 1 tablet by mouth Daily., Disp: , Rfl:   •  metFORMIN (GLUCOPHAGE) 500 MG tablet, Take 1 tablet by mouth Daily With Breakfast., Disp: , Rfl:   •  multivitamin with minerals tablet tablet, Take 1 tablet by mouth Daily., Disp: , Rfl:   •  pantoprazole (PROTONIX) 40 MG EC tablet, Take 1 tablet by mouth 2 (Two) Times a Day., Disp: ,  "Rfl:   •  prochlorperazine (COMPAZINE) 10 MG tablet, Take 1 tablet by mouth As Needed., Disp: , Rfl:   •  zolpidem (AMBIEN) 10 MG tablet, Take 1 tablet by mouth At Night As Needed., Disp: , Rfl:   •  famotidine (Pepcid) 40 MG tablet, Take 1 tablet by mouth every night at bedtime., Disp: 30 tablet, Rfl: 0    Allergies   Allergen Reactions   • Lisinopril Cough     Pt states it caused a severe cough and a lot of fatigue   • Alcohol Hives and Swelling   • Adhesive Tape Rash   • Other Rash     Reacts to B/P cuffs with itching and hives-states \"anything that touches my skin makes me welp up\"        Social History     Socioeconomic History   • Marital status:    Tobacco Use   • Smoking status: Never   • Smokeless tobacco: Never   Vaping Use   • Vaping Use: Never used   Substance and Sexual Activity   • Alcohol use: Never   • Drug use: Never   • Sexual activity: Not Currently     Partners: Male     Birth control/protection: None       Family History   Problem Relation Age of Onset   • Heart disease Father    • Heart disease Mother    • Irritable bowel syndrome Mother    • Stomach cancer Maternal Grandmother    • Prostate cancer Maternal Grandfather    • Breast cancer Paternal Aunt    • Colon cancer Neg Hx    • Colon polyps Neg Hx    • Esophageal cancer Neg Hx    • Liver cancer Neg Hx    • Liver disease Neg Hx    • Rectal cancer Neg Hx    • Ovarian cancer Neg Hx        Review of Systems   Constitutional: Negative for unexpected weight change.   Respiratory: Negative for shortness of breath.    Cardiovascular: Negative for chest pain.       Objective     /74 (BP Location: Left arm, Patient Position: Sitting, Cuff Size: Large Adult)   Pulse 85   Temp 96.8 °F (36 °C) (Infrared)   Ht 160 cm (63\")   Wt (!) 144 kg (317 lb)   SpO2 98%   Breastfeeding No   BMI 56.15 kg/m²     Physical Exam  Vitals reviewed.   Constitutional:       Appearance: Normal appearance.   Cardiovascular:      Rate and Rhythm: Normal rate " and regular rhythm.      Heart sounds: Normal heart sounds.   Pulmonary:      Effort: Pulmonary effort is normal.      Breath sounds: Normal breath sounds.   Neurological:      Mental Status: She is alert.           IMPRESSION/PLAN:    Assessment & Plan      Problem List Items Addressed This Visit        Gastrointestinal Abdominal     LUQ abdominal pain    Relevant Orders    Case Request (Completed)    Gastroesophageal reflux disease without esophagitis - Primary    Overview     Endoscopy in February 2020 showed LA grade a esophagitis.  No Fox's.  Endoscopy was repeated in August 2020 and no esophagitis was seen.  Again no Fox's.    Continue pantoprazole 40 mg daily    Anti-reflux measures were reviewed and discussed with patient.  They were advised to refrain from chocolate, alcohol, smoking, peppermint and caffeine.  They were advised to limit fatty foods, large meals, and eating late at nighttime.  They were advised to reach an ideal body mass index.           Relevant Medications    famotidine (Pepcid) 40 MG tablet    Other Relevant Orders    Case Request (Completed)   Endoscopy per Dr Guerrero  Add Pepcid 40mg once daily at night  Continue Pantoprazole once every morning    ..Pt is instructed to avoid caffeine, chocolate, peppermint and nicotine.  They are to avoid eating within 2-3 hours prior to bedtime.    Educated on ideal body weight for decreased acid reflux        ..The risks, benefits, and alternatives of endoscopy were reviewed with the patient today.  Risks including perforation, with or without dilation, possibly requiring surgery.  Additional risks include risk of bleeding.  There is also the risk of a drug reaction or problems with anesthesia.  This will be discussed with the further by the anesthesia team on the day of the procedure. The benefits include the diagnosis and management of disease of the upper digestive tract.  Alternatives to endoscopy include upper GI series, laboratory testing,  radiographic evaluation, or no intervention.  The patient verbalizes understanding and agrees.    In accordance with requirements under the Affordable Care Act, [facility] has provided pricing for all hospital services and items on each of its websites. However, a patient's actual cost may differ based on the services the patient receives to meet individual healthcare needs and based on the benefits provided under the patient’s insurance coverage.        Whit Liao, APRN  03/02/23  14:36 CST    Part of this note may be an electronic transcription/translation of spoken language to printed text.     CTA showed PE involving b/l upper lobes, R middle and lower lobe segmental pulmonary arteries.   b/l LE dopplers negative for DVT.   HD stable, on RA  - c/w heparin ggt to coumadin bridge  - monitor PTT/INR ; INR 1.13   - will dose coumadin 5mg tonight  - wants to f/u with Dr Broussard from [place], has appt on 7/17 at 2:30pm with his partner Dr De  - pt wants to get malignancy workup as outpatient

## 2023-12-28 NOTE — ASU PATIENT PROFILE, ADULT - NS PRO ABUSE SCREEN SUSPICION NEGLECT YN
Recommended supportive care:  - Increase fluid intake  - Encouraged adequate rest  - Recommended OTC claritin or zyrtec and flonase  - Take OTC motrin/tylenol for fevers/body aches  - Stay home until at least 24 hours fever free without medications. - The patient is to follow up with PCP or return to clinic if symptoms worsen/fail to improve. no

## 2024-07-09 NOTE — ASU PREOP CHECKLIST - PATIENT SENT AT
Pt has a normal liver, thyroid and kidney function.  Blood sugar and A1c were normal and no evidence of diabetes or prediabetes.  Electrolytes appropriate.   No anemia present on lab testing.     You have borderline high cholesterol.      The 10-year ASCVD risk score (Jesus Alberto WEAVER, et al., 2019) is: 3.3%    Values used to calculate the score:      Age: 58 years      Sex: Female      Is Non- : No      Diabetic: No      Tobacco smoker: No      Systolic Blood Pressure: 117 mmHg      Is BP treated: Yes      HDL Cholesterol: 64 mg/dL      Total Cholesterol: 228 mg/dL    Less than 5%= Low risk.  Between 5-7.4% = borderline line.  Between 7.5-19.9% = intermediate risk, a moderate intensity statin may be indicated.  Greater or equal to 20% = high risk, high intensity statin recommended.    Based on risk score, there is no need for statin at this time, but would recommend low cholesterol diet and exercise below.  Cholesterol goals are as follows:    Elevated LDL, goal <130.   HDL goal >60  Triglyceride goal <150  Total cholesterol goal <200    Encouraged heart healthy diet rich in fruits, vegetables, whole grains, lean meats, and fish encouraged (Mediterranean-style diet).  Eat less carbohydrates (less potatoes, pasta, bread).  Recommend using extra virgin olive oil, consuming nuts (almonds, walnuts) and plenty of purple fruit (blueberries, blackberries, eggplant, red cabbage) help increase your good cholesterol (HDL).     See American Heart Association website: https://healthyforgood.heart.org    Recommend regular cardio exercise with goal of 30 minutes 5 days per week.  You may need to start slow, moderate intensity (I.e. brisk walking) for at least 10min and slowly work up to goal.  If you do develop any chest pain, chest pressure, dizziness, difficulty breathing, stop and call our office or go to ER. 01-Dec-2021 08:09
